# Patient Record
Sex: MALE | Race: WHITE | HISPANIC OR LATINO | Employment: FULL TIME | URBAN - METROPOLITAN AREA
[De-identification: names, ages, dates, MRNs, and addresses within clinical notes are randomized per-mention and may not be internally consistent; named-entity substitution may affect disease eponyms.]

---

## 2017-02-27 RX ORDER — TRAZODONE HYDROCHLORIDE 50 MG/1
TABLET ORAL
Qty: 30 TABLET | Refills: 3 | Status: SHIPPED | OUTPATIENT
Start: 2017-02-27 | End: 2017-05-12 | Stop reason: SDUPTHER

## 2017-05-12 RX ORDER — TRAZODONE HYDROCHLORIDE 50 MG/1
TABLET ORAL
Qty: 30 TABLET | Refills: 3 | Status: SHIPPED | OUTPATIENT
Start: 2017-05-12 | End: 2017-05-31

## 2017-05-31 ENCOUNTER — OFFICE VISIT (OUTPATIENT)
Dept: FAMILY MEDICINE | Facility: CLINIC | Age: 47
End: 2017-05-31
Payer: COMMERCIAL

## 2017-05-31 VITALS
DIASTOLIC BLOOD PRESSURE: 60 MMHG | BODY MASS INDEX: 27 KG/M2 | HEIGHT: 65 IN | OXYGEN SATURATION: 97 % | SYSTOLIC BLOOD PRESSURE: 110 MMHG | HEART RATE: 79 BPM | WEIGHT: 162.06 LBS

## 2017-05-31 DIAGNOSIS — G47.00 INSOMNIA, UNSPECIFIED TYPE: Primary | ICD-10-CM

## 2017-05-31 DIAGNOSIS — J02.9 PHARYNGITIS, UNSPECIFIED ETIOLOGY: ICD-10-CM

## 2017-05-31 DIAGNOSIS — F32.A DEPRESSION, UNSPECIFIED DEPRESSION TYPE: ICD-10-CM

## 2017-05-31 PROCEDURE — 96372 THER/PROPH/DIAG INJ SC/IM: CPT | Mod: S$GLB,,, | Performed by: FAMILY MEDICINE

## 2017-05-31 PROCEDURE — 99214 OFFICE O/P EST MOD 30 MIN: CPT | Mod: 25,S$GLB,, | Performed by: FAMILY MEDICINE

## 2017-05-31 PROCEDURE — 99999 PR PBB SHADOW E&M-EST. PATIENT-LVL IV: CPT | Mod: PBBFAC,,, | Performed by: FAMILY MEDICINE

## 2017-05-31 RX ORDER — AMOXICILLIN 500 MG/1
500 TABLET, FILM COATED ORAL EVERY 8 HOURS
Qty: 21 TABLET | Refills: 0 | Status: SHIPPED | OUTPATIENT
Start: 2017-05-31 | End: 2017-06-07

## 2017-05-31 RX ORDER — TRIAMCINOLONE ACETONIDE 40 MG/ML
40 INJECTION, SUSPENSION INTRA-ARTICULAR; INTRAMUSCULAR
Status: COMPLETED | OUTPATIENT
Start: 2017-05-31 | End: 2017-05-31

## 2017-05-31 RX ADMIN — TRIAMCINOLONE ACETONIDE 40 MG: 40 INJECTION, SUSPENSION INTRA-ARTICULAR; INTRAMUSCULAR at 10:05

## 2017-05-31 NOTE — PROGRESS NOTES
Subjective:       Patient ID: Monster Crespo is a 47 y.o. male.    Chief Complaint: Follow-up (6 mos ) and Insomnia    47 years old male who came to the clinic with significant problem with insomnia for the last several years.  Patient using Elavil and trazodone with no significant improvement.  Patient with moderate depression.  No suicidal or homicidal ideations.  Patient with possible problems with his  Sleep cycle secondary to his work.  Patient with sore throat and painful swallowing for the last week.  No fever or chills.      Review of Systems   Constitutional: Negative.  Negative for chills and fever.   HENT: Positive for sore throat.    Eyes: Negative.    Respiratory: Negative.    Cardiovascular: Negative.    Gastrointestinal: Negative.    Genitourinary: Negative.    Musculoskeletal: Negative.    Skin: Negative.    Neurological: Negative.    Psychiatric/Behavioral: Positive for sleep disturbance. The patient is nervous/anxious.        Objective:      Physical Exam   Constitutional: He is oriented to person, place, and time. He appears well-developed and well-nourished. No distress.   HENT:   Head: Normocephalic and atraumatic.   Right Ear: External ear normal.   Left Ear: External ear normal.   Nose: Nose normal.   Mouth/Throat: Posterior oropharyngeal erythema present. No oropharyngeal exudate.   Eyes: Conjunctivae and EOM are normal. Pupils are equal, round, and reactive to light. Right eye exhibits no discharge. Left eye exhibits no discharge. No scleral icterus.   Neck: Normal range of motion. Neck supple. No JVD present. No tracheal deviation present. No thyromegaly present.   Cardiovascular: Normal rate, regular rhythm, normal heart sounds and intact distal pulses.  Exam reveals no gallop and no friction rub.    No murmur heard.  Pulmonary/Chest: Effort normal and breath sounds normal. No stridor. No respiratory distress. He has no wheezes. He has no rales. He exhibits no tenderness.   Abdominal:  Soft. Bowel sounds are normal. He exhibits no distension and no mass. There is no tenderness. There is no rebound and no guarding.   Musculoskeletal: Normal range of motion. He exhibits no edema or tenderness.   Lymphadenopathy:     He has no cervical adenopathy.   Neurological: He is alert and oriented to person, place, and time. He has normal reflexes. He displays normal reflexes. No cranial nerve deficit. He exhibits normal muscle tone. Coordination normal.   Skin: Skin is warm and dry. No rash noted. He is not diaphoretic. No erythema. No pallor.   Psychiatric: His behavior is normal. Judgment and thought content normal. His mood appears not anxious. His affect is not angry, not blunt, not labile and not inappropriate. He exhibits a depressed mood.   Nursing note and vitals reviewed.      Assessment:       1. Insomnia, unspecified type    2. Depression, unspecified depression type    3. Pharyngitis, unspecified etiology        Plan:         Monster PELAYO was seen today for follow-up and insomnia.    Diagnoses and all orders for this visit:    Insomnia, unspecified type  -     Ambulatory consult for Sleep Study    Depression, unspecified depression type  -     Ambulatory referral to Psychology    Pharyngitis, unspecified etiology  -     triamcinolone acetonide injection 40 mg; Inject 1 mL (40 mg total) into the muscle one time.  -     amoxicillin (AMOXIL) 500 MG Tab; Take 1 tablet (500 mg total) by mouth every 8 (eight) hours.

## 2017-05-31 NOTE — PATIENT INSTRUCTIONS
"  El Insomnio [Insomnia]  El insomnio se refiere a dificultad para dormirse, mantenerse dormido o ambos. El insomnio tiene muchas causas, incluyendo ansiedad, estrés, depresión, dolor crónico, ciclos de sueño fuera de equilibrio debido a trabajo de turno nocturno o a hacer demasiada siesta sylvia el día, y un trastrono denominado "apnea del sueño". El insomnio puede ser un efecto secundario de ciertos medicamentos con propiedades estimulantes (descongestivos, inhaladores y pastillas para el asma, pastillas para dieta y drogas ilegales henry anfetaminas, crack, metamfetamina y cucuy (fenciclidina).  Cuidado [place]:   1. Revise juice medicamentos con antonio médico o farmacéutico para determinar si pueden causar insomnio.  2. La cafeína, fumar y beber alcohol también afectan el sueño. Limite el uso diario y deje de usarlos antes de acostarse. El alcohol puede producirle sueño al comienzo, claudy a medida que antonio efecto se desvanece, usted puede despertarse horas más tarde y tener dificultad para volver a dormirse.  3. No chico ejercicio, coma o roro grandes cantidades por 2 horas antes de acostarse.  4. Mejore juice hábitos de dormir. Mantenga ryan hora fija para acostarse y levantarse. Trate de mantener un nivel cómodo de ruido, xochitl y calor en antonio dormitorio. Intente usar tapones para los oídos o viseras si las necesita.  5. Evite shea televisión en la cama.  6. Si no se queda dormido en 30 minutos o menos, trate de relajarse al leer o escuchar música suave.  7. Limite las siestas a períodos de no más de 30 minutos, temprano en el día.  8. [place]ico ejercicio regularmente. Encuentre otras maneras de reducir antonio nivel de estrés.  9. Si le damian recetado un medicamento para volver a establecer juice patrones de sueño, tómelo según las indicaciones. Las pastillas para dormir son para uso a corto plazo. Si las durga por demasiado tiempo, el efecto disminuye mientras aumenta el riesgo de adicción y dependencia psicológica.  Seguimiento con antonio " médico o de acuerdo a las indicaciones de nuestro personal si siente que el insomnio no responde a las indicaciones descritas arriba.  Busque Prontamente Atención Médica  si algo de lo siguiente ocurre:  · Inquietud extrema o irritabilidad  · Confusión o alucinaciones (shea o oír cosas que no existen)  · Ansiedad  · Depresión  Date Last Reviewed: 11/19/2015  © 3373-2433 [email or web address]. [address] [place] [address]. Todos los derechos reservados. Esta información no pretende sustituir la atención médica profesional. Sólo antonio médico puede diagnosticar y tratar un problema de galindo.        El tratamiento del insomnio  Los buenos hábitos de sueño son ryan parte fundamental del tratamiento. En lazaro necesario, hay medicamentos que pueden ayudarle a dormir mejor al principio. Llevar un estilo de portillo saludable y aprender a relajarse son factores que le ayudarán a dormir mejor. El tratamiento del insomnio requiere dedicación, claudy puede estar seguro de que juice esfuerzos darán buenos resultados. Hable con antonio proveedor de atención médica antes de liam cualquier medicamento.    Un estilo de portillo saludable  Antonio estilo de portillo influye en antonio galindo y sueño. He aquí algunas costumbres saludables:  · Mantenga un horario uniforme para dormir. Acuéstese y levántese a las mismas horas todos los días.  · Berenice ejercicio regularmente, ya que puede ayudarle a reducir el estrés. Evite el ejercicio demasiado intenso desde dos a cuatro horas antes de irse a la cama.  · Evite o limite las siestas, especialmente al final de la tarde.  · Use la cama sólo para dormir o para las relaciones sexuales.  · No pase demasiado tiempo en la cama tratando de dormirse. Si no puede dormirse, levántese y berenice otra actividad (evite el uso de aparatos electrónicos) hasta que se sienta cansado y con sueño.  · Evite o limite la cafeína y la nicotina desde seis horas antes de irse a dormir, ya que estas sustancias pueden mantenerle despierto  de noche.   · También evite el alcohol desde por lo menos cuatro a seis horas antes de irse a dormir. Al principio, puede ayudarle a dormirse claudy se despertará más veces sylvia la noche y antonio sueño no será reparador.  Antes de acostarse  Para dormir mejor todas las noches, pruebe estos consejos:  · Establezca ryan rutina antes de acostarse para acostumbrar al cuerpo a saber cuándo es hora de dormir.  · Considere la idea de irse a la cama henry algo relajante y placentero. De esta manera tardará menos en dormirse.  · Si juice inquietudes no le permiten dormir, escríbalas en un diario. Luego ciérrelo y acuéstese.  · Asegúrese de que la habitación no esté demasiado caliente ni demasiado fría. Si no es lo suficientemente oscura, ryan máscara para los ojos puede ser útil. Si es demasiado ruidosa, intente usar tapones para los oídos.  Aprenda a relajarse  El estrés, la ansiedad, y la tensión corporal pueden contribuir al insomnio. Para relajarse antes de irse a la cama, pruebe a liam un baño caliente o hacer meditación o yoga. También puede intentar lo siguiente:  · Respiración profunda. Siéntese o acuéstese en ryan silla. Inspire lenta y profundamente. Mantenga el aire mientras cuenta hasta sergio. Luego, saque lentamente el aire a través de la boca. Siga haciendo esto hasta que se sienta relajado.  · Relajación muscular progresiva. Tense y relaje los músculos del cuerpo a medida que respira profundamente. Comience por los pies y siga hacia arriba por todo el cuerpo hasta el damon y la alexis.  Date Last Reviewed: 7/18/2015  © 6023-6725 [employer]. [address] [place] [address], [place] [address]. Todos los derechos reservados. Esta información no pretende sustituir la atención médica profesional. Sólo antonio médico puede diagnosticar y tratar un problema de galindo.      Bandaid applied, tolerated well, pt instructed to wait 15 minutes.

## 2017-07-06 RX ORDER — TRAZODONE HYDROCHLORIDE 50 MG/1
TABLET ORAL
Qty: 30 TABLET | Refills: 3 | Status: SHIPPED | OUTPATIENT
Start: 2017-07-06 | End: 2017-09-29 | Stop reason: SDUPTHER

## 2017-08-21 ENCOUNTER — OFFICE VISIT (OUTPATIENT)
Dept: SLEEP MEDICINE | Facility: CLINIC | Age: 47
End: 2017-08-21
Payer: COMMERCIAL

## 2017-08-21 VITALS
HEIGHT: 65 IN | HEART RATE: 80 BPM | DIASTOLIC BLOOD PRESSURE: 74 MMHG | SYSTOLIC BLOOD PRESSURE: 107 MMHG | WEIGHT: 162 LBS | BODY MASS INDEX: 26.99 KG/M2

## 2017-08-21 DIAGNOSIS — G47.00 INSOMNIA, UNSPECIFIED TYPE: ICD-10-CM

## 2017-08-21 DIAGNOSIS — G47.33 OSA (OBSTRUCTIVE SLEEP APNEA): Primary | ICD-10-CM

## 2017-08-21 PROCEDURE — 3008F BODY MASS INDEX DOCD: CPT | Mod: S$GLB,,, | Performed by: PSYCHIATRY & NEUROLOGY

## 2017-08-21 PROCEDURE — 99204 OFFICE O/P NEW MOD 45 MIN: CPT | Mod: S$GLB,,, | Performed by: PSYCHIATRY & NEUROLOGY

## 2017-08-21 PROCEDURE — 99999 PR PBB SHADOW E&M-EST. PATIENT-LVL III: CPT | Mod: PBBFAC,,, | Performed by: PSYCHIATRY & NEUROLOGY

## 2017-08-21 RX ORDER — TEMAZEPAM 15 MG/1
CAPSULE ORAL
Qty: 30 CAPSULE | Refills: 2 | Status: SHIPPED | OUTPATIENT
Start: 2017-08-21 | End: 2018-04-10

## 2017-08-21 NOTE — LETTER
August 21, 2017      James Dawson MD  [address] [place] [address]           [facility]  [address] [facility] [address]  Phone: [phone]  Fax: [phone]          Patient: Monster Crespo   MR Number: [id number]   YOB: 1970   Date of Visit: 8/21/2017       Dear Dr. James Dawson:    Thank you for referring Monster Crespo to me for evaluation. Attached you will find relevant portions of my assessment and plan of care.    If you have questions, please do not hesitate to call me. I look forward to following Monster Crespo along with you.    Sincerely,    Pricila Interiano MD    Enclosure  CC:  No Recipients    If you would like to receive this communication electronically, please contact [email or web address] or [phone] to request more information on [employer] access.    For providers and/or their staff who would like to refer a patient to [facility], please contact us through our one-stop-shop provider referral line, [facility], at [phone].    If you feel you have received this communication in error or would no longer like to receive these types of communications, please e-mail [email or web address]

## 2017-08-21 NOTE — PROGRESS NOTES
Monster Crespo  was seen at the request of  James Dawson for sleep evaluation.    08/21/2017 INITIAL HISTORY OF PRESENT ILLNESS:  Monster Crespo is a 47 y.o. male is here to be evaluated for a sleep disorder.       CHIEF COMPLAINT:      The patient has mentioned difficulty falling and staying asleep.  He has been having this problem for the last 10 years. (since 2007).  Without medications would not sleep several nights in a row.    The patient states that he has already made some changes in regard to sleep hygiene, making sure that the bedroom is dark, features comfortable temperature and humidity level. The patient does watch TV and read in bed. he avoids going to bed before he is sleepy and gets out of bed if not asleep within 30 minutes. he avoids clock watching and tries not to worry about his ability to fall asleep.    Currently taking Trazodone 100 mg every night (but feeling nauseous).  He believes that only gives him 3-4 hrs of sleep  Prior to that - took Ambien 5 - also felt mildly nauseous. OTC (Melatonin) - did not work for him.   Lunesta - was not taking it too long.     The patient denied snoring, but sometimes gasping or choking  for air - that does not happen every night. Reports fatigue, but no sleepiness.  Likes to take a walk before going to bed    Denies  dry mouth and sore throat  Denies nasal congestion   Denies  morning headaches  Denies  interrupted sleep  Denies frequent leg movements  Denies symptoms concerning for parasomnia    The ESS ([place] Sleepiness Score) taken on initial visit is 0 /24    The patient never had tonsillectomy, adenoidectomy or UPPP      SLEEP ROUTINE AND LIFESTYLE 08/21/2017 :    Occupation: [employer] 1 pm -11 PM; short commute.     Bed partner: no     Time to bed - wake up time on a workday : 12 AM to 5-8 AM  Time to bed - wake up time on a day off: 111-12 to  5-8  Sleep onset latency: forever  Disruptions or awakenings: ?  Time to fall back into  sleep: ?   Perceived sleep quality: 0/5  Perceived total sleep time:  3-6  hours.  Daytime naps: none    Exercise routine: sometimes  Caffeine:  Decaf coffee     PREVIOUS SLEEP STUDIES:     Many years ago 6-7 years ago    DME:       PAST MEDICAL HISTORY:    Active Ambulatory Problems     Diagnosis Date Noted    Dyslipidemia 01/23/2014    Insomnia 01/23/2014    N&V (nausea and vomiting) 03/02/2015     Resolved Ambulatory Problems     Diagnosis Date Noted    No Resolved Ambulatory Problems     Past Medical History:   Diagnosis Date    GERD (gastroesophageal reflux disease)     Insomnia                 PAST SURGICAL HISTORY:    No past surgical history on file.      FAMILY HISTORY:                Family History   Problem Relation Age of Onset    Hypertension Mother     Amblyopia Neg Hx     Blindness Neg Hx     Cataracts Neg Hx     Glaucoma Neg Hx     Macular degeneration Neg Hx     Retinal detachment Neg Hx     Strabismus Neg Hx        SOCIAL HISTORY:          Tobacco:   History   Smoking Status    Never Smoker   Smokeless Tobacco    Never Used       alcohol use:    History   Alcohol Use    Yes     Comment: 1 a month                   ALLERGIES:  Review of patient's allergies indicates:  No Known Allergies    CURRENT MEDICATIONS:    Current Outpatient Prescriptions   Medication Sig Dispense Refill    lansoprazole (PREVACID) 30 MG capsule Take 30 mg by mouth once daily.      ondansetron (ZOFRAN-ODT) 8 MG TbDL Take 1 tablet (8 mg total) by mouth every 8 (eight) hours as needed (nausea/vomiting). 30 tablet 0    trazodone (DESYREL) 50 MG tablet TAKE 2 TABLETS (100 MG TOTAL) BY MOUTH NIGHTLY AS NEEDED FOR INSOMNIA. 30 tablet 3     No current facility-administered medications for this visit.                       REVIEW OF SYSTEMS:   Sleep related symptoms as per HPI    denies weight gain  Reports occasional dyspnea  Denies palpitations  Reports occasional acid reflux   Denies polyuria  Denies  mood  "diturbance  Denies  anemia  Denies  muscle pain  Denies  Gait imbalance    Otherwise, a balance of 10 systems reviewed is negative.    PHYSICAL EXAM:  /74   Pulse 80   Ht 5' 5" (1.651 m)   Wt 73.5 kg (162 lb)   BMI 26.96 kg/m²   GENERAL: Normal development, well groomed.  HEENT:   HEENT:  Conjunctivae are non-erythematous; Pupils equal, round, and reactive to light; Nose is symmetrical; Nasal mucosa is pink and moist; Septum is midline; Inferior turbinates are normal; Nasal airflow is normal; Posterior pharynx is pink; Modified Mallampati:IV; Posterior palate is low; Tonsils not visualized; Uvula is reddened;Tongue is enlarged; Dentition is fair; No TMJ tenderness; Jaw opening and protrusion without click and without discomfort.  NECK: Supple. Neck circumference is 15 inches. No thyromegaly. No palpable nodes.     SKIN: On face and neck: No abrasions, no rashes, no lesions.  No subcutaneous nodules are palpable.  RESPIRATORY: Chest is clear to auscultation.  Normal chest expansion and non-labored breathing at rest.  CARDIOVASCULAR: Normal S1, S2.  No murmurs, gallops or rubs. No carotid bruits bilaterally.  No edema. No clubbing. No cyanosis.    NEURO: Oriented to time, place and person. Normal attention span and concentration. Gait normal.    PSYCH: Affect is full. Mood is normal  MUSCULOSKELETAL: Moves 4 extremities. Gait normal.         Using [facility]:       ASSESSMENT:      1. Insomnia NEC. Multi-factorial - anxiety,   excess time in bed, poor sleep hygiene, and likely paradoxical insomnia play a role    2. Sleep Apnea NEC. The patient symptomatically has  snoring, excessive daytime fatigue, gasping for air in sleep and interrupted sleep  with exam findings of "a crowded oral airway and elevated body mass index. . This warrants further investigation for possible obstructive sleep apnea.          PLAN:    Sleep hygiene was discussed.     Diagnostic: Polysomnogram HOME ( [employer]). The nature of this " procedure and its indication was discussed with the patient. he would  like to come discuss PSG results.    Will try Restoril 15-30 mg PRN.  Can alternate or combine with Trazodone    Weight loss strategies were discussed in detail         More than 25 minutes of this 45 minutes visit was spent in counseling: during our discussion today, we talked about the etiology of  LUDY as well as the potential ramifications of untreated sleep apnea, which could include daytime sleepiness, hypertension, heart disease and/or stroke.  We discussed potential treatment options, which could include weight loss, body positioning, continuous positive airway pressure (CPAP), or referral for surgical consideration. Meanwhile, he  is urged to avoid supine sleep, weight gain and alcoholic beverages since all of these can worsen LUDY.     Precautions: The patient was advised to abstain from driving should he feel sleepy or drowsy.    Follow up: MD/NP  After HST    Thank you for allowing me the opportunity to participate in the care of your patient.    This visit summary will be sent to referring provider via inbasket

## 2017-08-21 NOTE — PATIENT INSTRUCTIONS
SLEEP LAB ([facility] or [facility]) will contact you to schedulethe sleep study. Their number is [phone] (ext 1). Please call them if you do not hear from them in 10 business days from now.  The [facility] is located on 7th floor of the [facility]; [place] lab is located in [facility].    SLEEP CLINIC (my assistant) will call you when the sleep study results are ready - if you have not heard from us by 2 weeks from the date of the study, please call [phone] (ext 2) or you can [facility] to contact me.    You are advised to abstain from driving should you feel sleepy or drowsy.

## 2017-08-31 ENCOUNTER — TELEPHONE (OUTPATIENT)
Dept: SLEEP MEDICINE | Facility: OTHER | Age: 47
End: 2017-08-31

## 2017-09-12 ENCOUNTER — TELEPHONE (OUTPATIENT)
Dept: SLEEP MEDICINE | Facility: OTHER | Age: 47
End: 2017-09-12

## 2017-09-13 ENCOUNTER — HOSPITAL ENCOUNTER (OUTPATIENT)
Dept: SLEEP MEDICINE | Facility: OTHER | Age: 47
Discharge: HOME | End: 2017-09-13
Attending: PSYCHIATRY & NEUROLOGY
Payer: COMMERCIAL

## 2017-09-13 DIAGNOSIS — G47.33 OSA (OBSTRUCTIVE SLEEP APNEA): ICD-10-CM

## 2017-09-13 DIAGNOSIS — G47.20 SLEEP STAGE DYSFUNCTION: ICD-10-CM

## 2017-09-13 PROCEDURE — 95800 SLP STDY UNATTENDED: CPT

## 2017-09-13 PROCEDURE — 95806 SLEEP STUDY UNATT&RESP EFFT: CPT | Mod: 26,,, | Performed by: PSYCHIATRY & NEUROLOGY

## 2017-09-13 NOTE — PROGRESS NOTES
day Per physician orders, patient was given home sleep testing device and instructed on how to apply the device before going to bed tonight.  I sized the device and showed the patient using a mirror how the device fits and what it should look like so they can use a mirror when putting it on themselves at home.  We reviewed the instruction booklet and the number to call if they have any questions at night.  Patient understood and was instructed to return the device the next back to the sleep lab.

## 2017-09-29 RX ORDER — TRAZODONE HYDROCHLORIDE 50 MG/1
TABLET ORAL
Qty: 30 TABLET | Refills: 3 | Status: SHIPPED | OUTPATIENT
Start: 2017-09-29 | End: 2017-12-01 | Stop reason: SDUPTHER

## 2017-10-11 ENCOUNTER — TELEPHONE (OUTPATIENT)
Dept: SLEEP MEDICINE | Facility: CLINIC | Age: 47
End: 2017-10-11

## 2017-10-11 DIAGNOSIS — G47.30 SLEEP APNEA, UNSPECIFIED TYPE: ICD-10-CM

## 2017-10-11 DIAGNOSIS — G47.33 OSA (OBSTRUCTIVE SLEEP APNEA): Primary | ICD-10-CM

## 2017-10-11 NOTE — TELEPHONE ENCOUNTER
Please inform the patient that there is a strong indication of a sleep breathing problem on a sleep study, however he did not meet home sleep study criteria for sleep apnea.    It is recommended to do in lab sleep study for confirmation - now that he failed HST his insurance may approve in lab.    I will place an order.    SLEEP LAB ([facility] or [facility]) will contact you to schedulethe sleep study. Their number is [phone] (ext 2). Please call them if you do not hear from them in 10 business days from now.  The [facility] is located on 7th floor of the [facility]; [place] lab is located in [facility].    SLEEP CLINIC (my assistant) will call you when the sleep study results are ready - if you have not heard from us by 2 weeks from the date of the study, please call [phone] (ext 1) or you can [facility] to contact me.    You are advised to abstain from driving should you feel sleepy or drowsy.          Thanks!

## 2017-11-03 ENCOUNTER — TELEPHONE (OUTPATIENT)
Dept: SLEEP MEDICINE | Facility: OTHER | Age: 47
End: 2017-11-03

## 2017-11-14 ENCOUNTER — TELEPHONE (OUTPATIENT)
Dept: SLEEP MEDICINE | Facility: OTHER | Age: 47
End: 2017-11-14

## 2017-11-21 ENCOUNTER — OFFICE VISIT (OUTPATIENT)
Dept: FAMILY MEDICINE | Facility: CLINIC | Age: 47
End: 2017-11-21
Payer: COMMERCIAL

## 2017-11-21 VITALS
HEIGHT: 65 IN | HEART RATE: 75 BPM | BODY MASS INDEX: 25.99 KG/M2 | DIASTOLIC BLOOD PRESSURE: 78 MMHG | WEIGHT: 156 LBS | OXYGEN SATURATION: 97 % | SYSTOLIC BLOOD PRESSURE: 120 MMHG

## 2017-11-21 DIAGNOSIS — K21.9 GASTROESOPHAGEAL REFLUX DISEASE WITHOUT ESOPHAGITIS: ICD-10-CM

## 2017-11-21 DIAGNOSIS — G47.00 INSOMNIA, UNSPECIFIED TYPE: Primary | ICD-10-CM

## 2017-11-21 DIAGNOSIS — R05.9 COUGH: ICD-10-CM

## 2017-11-21 PROCEDURE — 99214 OFFICE O/P EST MOD 30 MIN: CPT | Mod: S$GLB,,, | Performed by: FAMILY MEDICINE

## 2017-11-21 PROCEDURE — 99999 PR PBB SHADOW E&M-EST. PATIENT-LVL III: CPT | Mod: PBBFAC,,, | Performed by: FAMILY MEDICINE

## 2017-11-21 RX ORDER — PANTOPRAZOLE SODIUM 40 MG/1
40 TABLET, DELAYED RELEASE ORAL DAILY
Qty: 30 TABLET | Refills: 11 | Status: SHIPPED | OUTPATIENT
Start: 2017-11-21 | End: 2021-06-29

## 2017-11-21 NOTE — PROGRESS NOTES
Subjective:       Patient ID: Monster Crespo is a 47 y.o. male.    Chief Complaint: No chief complaint on file.    47 years old male came to the clinic with significant cough and shortness of breath especially during the night associated with reflux for the last year.  Patient with insomnia and was not able to do the sleep study.      Review of Systems   Constitutional: Negative.    HENT: Negative.    Eyes: Negative.    Respiratory: Positive for cough.    Cardiovascular: Negative.  Negative for chest pain, palpitations and leg swelling.   Gastrointestinal: Negative.  Negative for nausea and vomiting.   Genitourinary: Negative.    Musculoskeletal: Negative.    Skin: Negative.    Neurological: Negative.    Psychiatric/Behavioral: Positive for sleep disturbance.       Objective:      Physical Exam   Constitutional: He is oriented to person, place, and time. He appears well-developed and well-nourished. No distress.   HENT:   Head: Normocephalic and atraumatic.   Right Ear: External ear normal.   Left Ear: External ear normal.   Nose: Nose normal.   Mouth/Throat: Oropharynx is clear and moist. No oropharyngeal exudate.   Eyes: Conjunctivae and EOM are normal. Pupils are equal, round, and reactive to light. Right eye exhibits no discharge. Left eye exhibits no discharge. No scleral icterus.   Neck: Normal range of motion. Neck supple. No JVD present. No tracheal deviation present. No thyromegaly present.   Cardiovascular: Normal rate, regular rhythm, normal heart sounds and intact distal pulses.  Exam reveals no gallop and no friction rub.    No murmur heard.  Pulmonary/Chest: Effort normal and breath sounds normal. No stridor. No respiratory distress. He has no wheezes. He has no rales. He exhibits no tenderness.   Abdominal: Soft. Bowel sounds are normal. He exhibits no distension and no mass. There is no tenderness. There is no rebound and no guarding.   Musculoskeletal: Normal range of motion. He exhibits no edema  or tenderness.   Lymphadenopathy:     He has no cervical adenopathy.   Neurological: He is alert and oriented to person, place, and time. He has normal reflexes. He displays normal reflexes. No cranial nerve deficit. He exhibits normal muscle tone. Coordination normal.   Skin: Skin is warm and dry. No rash noted. He is not diaphoretic. No erythema. No pallor.   Psychiatric: He has a normal mood and affect. His behavior is normal. Judgment and thought content normal.   Nursing note and vitals reviewed.      Assessment:       1. Insomnia, unspecified type    2. Gastroesophageal reflux disease without esophagitis    3. Cough        Plan:         Diagnoses and all orders for this visit:    Insomnia, unspecified type    Gastroesophageal reflux disease without esophagitis  -     pantoprazole (PROTONIX) 40 MG tablet; Take 1 tablet (40 mg total) by mouth once daily.  -     H.Pylori Antibody IgG; Future    Cough  -     X-Ray Chest PA And Lateral; Future

## 2017-11-21 NOTE — PATIENT INSTRUCTIONS
Consejos para controlar el reflujo de ácido (agruras)    Para controlar el reflujo de ácido es necesario hacer algunos cambios básicos en la alimentación y el estilo de portillo. Los siguientes consejos pueden ser suficientes para aliviar el malestar.  Vigile lo que come  · Evite los alimentos grasosos o demasiado picantes.  · Coma menos alimentos ácidos, henry cítricos y alimentos a base de tomates, ya que pueden agravar los síntomas.  · Limite el consumo de alcohol, cafeína y bebidas gaseosas. Todas estas bebidas aumentan el reflujo.  · Intente limitar el consumo de chocolate y menta. Anny puede empeorar el reflujo de ácido en algunas personas.  Vigile cuándo come  · Evite acostarse sylvia 3 horas después de amrik comido.  · No coma nada antes de irse a la cama.  Mantenga la alexis levantada  Mantener la alexis y el pecho elevados unas 4 a 6 pulgadas, le ayudará a aliviar el reflujo cuando esté acostado. Ponga soportes debajo de la cabecera de la cama para elevarla.  Otros cambios  · Pierda el exceso de peso.  · No berenice ejercicio poco antes de acostarse.  · Evite usar ropas demasiado ajustadas.  · Limite el uso de aspirina e ibuprofeno.  · Deje de fumar.   Date Last Reviewed: 3/14/2014  © 9469-3268 [employer]. [address] [place] [address], [place] [address]. Todos los derechos reservados. Esta información no pretende sustituir la atención médica profesional. Sólo antonio médico puede diagnosticar y tratar un problema de galindo.        Medicamentos para el reflujo de ácido (agruras)  Antonio proveedor de atención médica le ha dicho que usted tiene reflujo de ácido. Anny es un trastorno que hace que los ácidos del estómago se devuelvan a antonio garganta. Para la mayoría de las personas, el reflujo de ácido es molesto claudy no peligroso. Sin embargo, si se ayanna sin tratar, el reflujo de ácido algunas veces daña el esófago. Hay medicamentos que pueden ayudar a controlar el reflujo de ácido y disminuir los riesgos de problemas  futuros.  Medicamentos para el reflujo de ácido  Es posible que antonio proveedor de atención médica le recete medicamentos para tratar el reflujo de ácido. Los medicamentos se basarán en los síntomas que tenga y en los resultados de las pruebas. Antonio proveedor de atención médica le explicará cómo liam los medicamentos. También le informarán de posibles efectos secundarios.  Reducción del ácido del estómago  Es posible que el médico le aconseje liam antiácidos que puede comprar sin receta. Los antiácidos proporcionan un alivio rápido. También puede que le indiquen liam un tipo de medicamentos llamados bloqueadores de los receptores H2, que se pueden obtener sin receta para dosis comunes o con receta para dosis más elevadas.  Bloqueo del ácido del estómago  En casos más graves, el médico podrá recetarle medicamentos más gerry tales henry los inhibidores de la bomba de protones (PPI, por juice siglas en inglés), los cuales hacen que el estómago deje de producir ácido. Con frecuencia se recetan para uso a michelle plazo.  Otros medicamentos  Si los medicamentos para reducir o bloquear el ácido del estómago no funcionan, es posible que le den otro tipo de medicamentos para ayudar a que el estómago se vacíe mejor.  Date Last Reviewed: 3/27/2014  © 5592-0655 [employer]. [address] [place] [address], [place] [address]. Todos los derechos reservados. Esta información no pretende sustituir la atención médica profesional. Sólo antonio médico puede diagnosticar y tratar un problema de galindo.

## 2017-11-27 ENCOUNTER — HOSPITAL ENCOUNTER (OUTPATIENT)
Dept: RADIOLOGY | Facility: HOSPITAL | Age: 47
Discharge: HOME | End: 2017-11-27
Attending: FAMILY MEDICINE
Payer: COMMERCIAL

## 2017-11-27 DIAGNOSIS — R05.9 COUGH: ICD-10-CM

## 2017-11-27 PROCEDURE — 71020 XR CHEST PA AND LATERAL: CPT | Mod: TC

## 2017-11-27 PROCEDURE — 71020 XR CHEST PA AND LATERAL: CPT | Mod: 26,,, | Performed by: RADIOLOGY

## 2017-12-01 RX ORDER — TRAZODONE HYDROCHLORIDE 50 MG/1
TABLET ORAL
Qty: 30 TABLET | Refills: 3 | Status: SHIPPED | OUTPATIENT
Start: 2017-12-01 | End: 2018-02-09 | Stop reason: SDUPTHER

## 2018-01-10 ENCOUNTER — OFFICE VISIT (OUTPATIENT)
Dept: URGENT CARE | Facility: CLINIC | Age: 48
End: 2018-01-10
Payer: COMMERCIAL

## 2018-01-10 VITALS
BODY MASS INDEX: 25.99 KG/M2 | HEIGHT: 65 IN | HEART RATE: 88 BPM | SYSTOLIC BLOOD PRESSURE: 112 MMHG | TEMPERATURE: 100 F | WEIGHT: 156 LBS | DIASTOLIC BLOOD PRESSURE: 73 MMHG | RESPIRATION RATE: 18 BRPM | OXYGEN SATURATION: 96 %

## 2018-01-10 DIAGNOSIS — J11.1 INFLUENZA: Primary | ICD-10-CM

## 2018-01-10 DIAGNOSIS — R52 BODY ACHES: ICD-10-CM

## 2018-01-10 LAB
CTP QC/QA: YES
FLUAV AG NPH QL: POSITIVE
FLUBV AG NPH QL: NEGATIVE

## 2018-01-10 PROCEDURE — 87804 INFLUENZA ASSAY W/OPTIC: CPT | Mod: 59,QW,S$GLB, | Performed by: PHYSICIAN ASSISTANT

## 2018-01-10 PROCEDURE — 99203 OFFICE O/P NEW LOW 30 MIN: CPT | Mod: S$GLB,,, | Performed by: PHYSICIAN ASSISTANT

## 2018-01-10 RX ORDER — OSELTAMIVIR PHOSPHATE 75 MG/1
75 CAPSULE ORAL 2 TIMES DAILY
Qty: 10 CAPSULE | Refills: 0 | Status: SHIPPED | OUTPATIENT
Start: 2018-01-10 | End: 2018-01-15

## 2018-01-10 RX ORDER — PREDNISONE 20 MG/1
20 TABLET ORAL DAILY
COMMUNITY
End: 2018-01-10 | Stop reason: HOSPADM

## 2018-01-10 RX ORDER — BENZONATATE 200 MG/1
200 CAPSULE ORAL 3 TIMES DAILY PRN
COMMUNITY
End: 2018-07-10

## 2018-01-10 RX ORDER — PROMETHAZINE HYDROCHLORIDE AND CODEINE PHOSPHATE 6.25; 1 MG/5ML; MG/5ML
5 SOLUTION ORAL EVERY 4 HOURS PRN
COMMUNITY
End: 2018-04-10

## 2018-01-10 NOTE — PATIENT INSTRUCTIONS
Please drink plenty of fluids.  Please get plenty of rest.  Please return here or go to the Emergency Department for any concerns or worsening of condition.  Tamiflu prescription has been discussed and if prescribed, please take to completion unless you cannot tolerate the side effects.   If you were prescribed a narcotic medication, do not drive or operate heavy equipment or machinery while taking these medications.  If you were given a steroid shot in the clinic and have also been given a prescription for a steroid such as Prednisone or a Medrol Dose Pack, please begin taking them tomorrow.  If you do not have Hypertension or any history of palpitations, it is ok to take over the counter Sudafed or Mucinex D or Allegra-D or Claritin-D or Zyrtec-D.  If you do take one of the above, it is ok to combine that with plain over the counter Mucinex or Allegra or Claritin or Zyrtec.  If for example you are taking Zyrtec -D, you can combine that with Mucinex, but not Mucinex-D.  If you are taking Mucinex-D, you can combine that with plain Allegra or Claritin or Zyrtec.   If you do have Hypertension or palpitations, it is safe to take Coricidin HBP for relief of sinus symptoms.  If not allergic, please take over the counter Tylenol (Acetaminophen) and/or Motrin (Ibuprofen) as directed for control of pain and/or fever.  Please follow up with your primary care doctor or specialist as needed.    If you  smoke, please stop smoking.

## 2018-01-10 NOTE — PROGRESS NOTES
"Subjective:       Patient ID: Monster Crespo is a 47 y.o. male.    Vitals:  height is 5' 5" (1.651 m) and weight is 70.8 kg (156 lb). His tympanic temperature is 99.8 °F (37.7 °C). His blood pressure is 112/73 and his pulse is 88. His respiration is 18 and oxygen saturation is 96%.     Chief Complaint: Generalized Body Aches and Cough    Patient states he went to [facility] urgent care and tested negative for the flu on 1/6/2018. He reports flu-like sxs x 3 days. MB      Cough   This is a new problem. The current episode started in the past 7 days. The problem has been unchanged. The problem occurs constantly. The cough is productive of sputum. Associated symptoms include chills and a sore throat. Pertinent negatives include no chest pain, ear pain, eye redness, fever, myalgias, shortness of breath or wheezing. He has tried prescription cough suppressant for the symptoms. The treatment provided no relief. His past medical history is significant for pneumonia.     Review of Systems   Constitution: Positive for chills and malaise/fatigue. Negative for fever.   HENT: Positive for congestion and sore throat. Negative for ear pain and hoarse voice.    Eyes: Negative for discharge and redness.   Cardiovascular: Negative for chest pain, dyspnea on exertion and leg swelling.   Respiratory: Positive for cough and sputum production. Negative for shortness of breath and wheezing.    Musculoskeletal: Negative for myalgias.        All over body aches   Gastrointestinal: Negative for abdominal pain and nausea.       Objective:      Physical Exam   Constitutional: He is oriented to person, place, and time. He appears well-developed and well-nourished. He is cooperative.  Non-toxic appearance. He appears ill. No distress.   HENT:   Head: Normocephalic and atraumatic.   Right Ear: Hearing, tympanic membrane, external ear and ear canal normal.   Left Ear: Hearing, tympanic membrane, external ear and ear canal normal.   Nose: " Nose normal. No mucosal edema, rhinorrhea or nasal deformity. No epistaxis. Right sinus exhibits no maxillary sinus tenderness and no frontal sinus tenderness. Left sinus exhibits no maxillary sinus tenderness and no frontal sinus tenderness.   Mouth/Throat: Uvula is midline, oropharynx is clear and moist and mucous membranes are normal. No trismus in the jaw. Normal dentition. No uvula swelling. No posterior oropharyngeal erythema.   Eyes: Conjunctivae and lids are normal. Right eye exhibits no discharge. Left eye exhibits no discharge. No scleral icterus.   Sclera clear bilat   Neck: Trachea normal, normal range of motion, full passive range of motion without pain and phonation normal. Neck supple.   Cardiovascular: Normal rate, regular rhythm, normal heart sounds, intact distal pulses and normal pulses.    Pulmonary/Chest: Effort normal and breath sounds normal. No respiratory distress.   Abdominal: Soft. Normal appearance and bowel sounds are normal. He exhibits no distension, no pulsatile midline mass and no mass. There is no tenderness.   Musculoskeletal: Normal range of motion. He exhibits no edema or deformity.   Neurological: He is alert and oriented to person, place, and time. He exhibits normal muscle tone. Coordination normal.   Skin: Skin is warm, dry and intact. He is not diaphoretic. No pallor.   Psychiatric: He has a normal mood and affect. His speech is normal and behavior is normal. Judgment and thought content normal. Cognition and memory are normal.   Nursing note and vitals reviewed.        Results for orders placed or performed in visit on 01/10/18   POCT Influenza A/B   Result Value Ref Range    Rapid Influenza A Ag Positive (A) Negative    Rapid Influenza B Ag Negative Negative     Acceptable Yes      Assessment:       1. Influenza    2. Body aches        Plan:         Influenza  -     oseltamivir (TAMIFLU) 75 MG capsule; Take 1 capsule (75 mg total) by mouth 2 (two) times  daily.  Dispense: 10 capsule; Refill: 0    Body aches  -     POCT Influenza A/B    Please drink plenty of fluids.  Please get plenty of rest.  Please return here or go to the Emergency Department for any concerns or worsening of condition.  Tamiflu prescription has been discussed and if prescribed, please take to completion unless you cannot tolerate the side effects.   If you were prescribed a narcotic medication, do not drive or operate heavy equipment or machinery while taking these medications.  If you were given a steroid shot in the clinic and have also been given a prescription for a steroid such as Prednisone or a Medrol Dose Pack, please begin taking them tomorrow.  If you do not have Hypertension or any history of palpitations, it is ok to take over the counter Sudafed or Mucinex D or Allegra-D or Claritin-D or Zyrtec-D.  If you do take one of the above, it is ok to combine that with plain over the counter Mucinex or Allegra or Claritin or Zyrtec.  If for example you are taking Zyrtec -D, you can combine that with Mucinex, but not Mucinex-D.  If you are taking Mucinex-D, you can combine that with plain Allegra or Claritin or Zyrtec.   If you do have Hypertension or palpitations, it is safe to take Coricidin HBP for relief of sinus symptoms.  If not allergic, please take over the counter Tylenol (Acetaminophen) and/or Motrin (Ibuprofen) as directed for control of pain and/or fever.  Please follow up with your primary care doctor or specialist as needed.    If you  smoke, please stop smoking.

## 2018-01-10 NOTE — LETTER
January 10, 2018      [facility]  [address] [address]  Phone: [phone]  Fax: [phone]       Patient: Monster Crespo   YOB: 1970  Date of Visit: 01/10/2018    To Whom It May Concern:    Caprice Crespo  was at [facility] on 01/10/2018. He may return to work/school on Friday 1/12/2018 without restrictions. If you have any questions or concerns, or if I can be of further assistance, please do not hesitate to contact me.    Sincerely,        Calin Chacko PA-C

## 2018-02-09 RX ORDER — TRAZODONE HYDROCHLORIDE 50 MG/1
TABLET ORAL
Qty: 30 TABLET | Refills: 3 | Status: SHIPPED | OUTPATIENT
Start: 2018-02-09 | End: 2018-03-29 | Stop reason: SDUPTHER

## 2018-03-15 ENCOUNTER — OFFICE VISIT (OUTPATIENT)
Dept: URGENT CARE | Facility: CLINIC | Age: 48
End: 2018-03-15
Payer: COMMERCIAL

## 2018-03-15 VITALS
TEMPERATURE: 99 F | OXYGEN SATURATION: 97 % | HEIGHT: 66 IN | WEIGHT: 150 LBS | RESPIRATION RATE: 12 BRPM | SYSTOLIC BLOOD PRESSURE: 132 MMHG | HEART RATE: 82 BPM | BODY MASS INDEX: 24.11 KG/M2 | DIASTOLIC BLOOD PRESSURE: 78 MMHG

## 2018-03-15 DIAGNOSIS — J02.9 ACUTE PHARYNGITIS, UNSPECIFIED ETIOLOGY: ICD-10-CM

## 2018-03-15 DIAGNOSIS — J02.9 SORE THROAT: Primary | ICD-10-CM

## 2018-03-15 LAB
CTP QC/QA: YES
S PYO RRNA THROAT QL PROBE: NEGATIVE

## 2018-03-15 PROCEDURE — 96372 THER/PROPH/DIAG INJ SC/IM: CPT | Mod: S$GLB,,, | Performed by: FAMILY MEDICINE

## 2018-03-15 PROCEDURE — 87880 STREP A ASSAY W/OPTIC: CPT | Mod: QW,S$GLB,, | Performed by: FAMILY MEDICINE

## 2018-03-15 PROCEDURE — 99213 OFFICE O/P EST LOW 20 MIN: CPT | Mod: 25,S$GLB,, | Performed by: FAMILY MEDICINE

## 2018-03-15 RX ORDER — AZITHROMYCIN 250 MG/1
250 TABLET, FILM COATED ORAL DAILY
Qty: 6 TABLET | Refills: 0 | Status: SHIPPED | OUTPATIENT
Start: 2018-03-15 | End: 2018-03-20

## 2018-03-15 RX ORDER — BETAMETHASONE SODIUM PHOSPHATE AND BETAMETHASONE ACETATE 3; 3 MG/ML; MG/ML
6 INJECTION, SUSPENSION INTRA-ARTICULAR; INTRALESIONAL; INTRAMUSCULAR; SOFT TISSUE
Status: COMPLETED | OUTPATIENT
Start: 2018-03-15 | End: 2018-03-15

## 2018-03-15 RX ADMIN — BETAMETHASONE SODIUM PHOSPHATE AND BETAMETHASONE ACETATE 6 MG: 3; 3 INJECTION, SUSPENSION INTRA-ARTICULAR; INTRALESIONAL; INTRAMUSCULAR; SOFT TISSUE at 01:03

## 2018-03-15 NOTE — PATIENT INSTRUCTIONS
.ou  Pharyngitis (Sore Throat), Report Pending    Pharyngitis (sore throat) is often due to a virus. It can also be caused by the streptococcus, or strep, bacterium, often called strep throat. Both viral and strep infections can cause throat pain that is worse when swallowing, aching all over with headache, and fever. Both types of infections are contagious. They may be spread by coughing, kissing, or touching others after touching your mouth or nose.  A test has been done to find out whether you (or your child, if your child is the patient) have strep throat. Call this facility or your healthcare provider if you were not given your test results. If the test is positive for strep infection, you will need to take antibiotic medicines. A prescription can be called into your pharmacy at that time. If the test is negative, you probably have a viral pharyngitis. This does not need to be treated with antibiotics. Until you receive the results of the strep test, you should stay home from work. If your child is being tested, he or she should stay home from school.  Home care  · Rest at home. Drink plenty of fluids so you won't get dehydrated.  · If the test is positive for strep, don't go to work or school for the first 2 days of taking the antibiotics. After this time, you will not be contagious. You can then return to work or school if you are feeling better.   · Take the antibiotic medicine for the full 10 days, even if you feel better. This is very important to make sure the infection is treated. It is also important to prevent drug-resistant germs from developing. If you were given an antibiotic shot, you won't need more antibiotics.  · For children: Use acetaminophen for fever, fussiness, or discomfort. In infants older than 6 months of age, you may use ibuprofen instead of acetaminophen. Talk with your child's healthcare provider before giving these medicines if your child has chronic liver or kidney disease or ever  had a stomach ulcer or GI bleeding. Never give aspirin to a child under 18 years of age who is ill with a fever. It may cause severe liver damage.  · For adults: Use acetaminophen or ibuprofen to control pain or fever, unless another medicine was prescribed for this. Talk with your healthcare provider before taking these medicines if you have chronic liver or kidney disease or ever had a stomach ulcer or GI bleeding.  · Use throat lozenges or numbing throat sprays to help reduce pain. Gargling with warm salt water will also help reduce throat pain. For this, dissolve 1/2 teaspoon of salt in 1 glass of warm water. To help soothe a sore throat, children can sip on juice or a popsicle. Children 5 years and older can also suck on a lollipop or hard candy.  · Don't eat salty or spicy foods. These can irritate the throat.  Follow-up care  Follow up with your healthcare provider or our staff if you don't get better over the next week.  When to seek medical advice  Call your healthcare provider right away if any of these occur:  · Fever as directed by your healthcare provider. For children, seek care if:  ¨ Your child is of any age and has repeated fevers above 104°F (40°C).  ¨ Your child is younger than 2 years of age and has a fever of 100.4°F (38°C) that continues for more than 1 day.  ¨ Your child is 2 years old or older and has a fever of 100.4°F (38°C) that continues for more than 3 days.  · New or worsening ear pain, sinus pain, or headache  · Painful lumps in the back of neck  · Stiff neck  · Lymph nodes are getting larger  · Inability to swallow liquids, excessive drooling, or inability to open mouth wide due to throat pain  · Signs of dehydration (very dark urine or no urine, sunken eyes, dizziness)  · Trouble breathing or noisy breathing  · Muffled voice  · New rash  · Child appears to be getting sicker  Date Last Reviewed: 4/13/2015  © 6267-2023 [employer]. [address] [place] [address], [place]  [address]. All rights reserved. This information is not intended as a substitute for professional medical care. Always follow your healthcare professional's instructions.

## 2018-03-15 NOTE — PROGRESS NOTES
"Subjective:       Patient ID: Monster Crespo is a 48 y.o. male.    Vitals:  height is 5' 6" (1.676 m) and weight is 68 kg (150 lb). His oral temperature is 99 °F (37.2 °C). His blood pressure is 132/78 and his pulse is 82. His respiration is 12 and oxygen saturation is 97%.     Chief Complaint: Sore Throat    C/o hurts to swallow, no fever, no cough      Sore Throat    This is a new problem. The current episode started yesterday. The problem has been unchanged. There has been no fever. Associated symptoms include congestion. Pertinent negatives include no abdominal pain, coughing, ear pain, headaches, hoarse voice or shortness of breath. He has had no exposure to strep or mono. He has tried acetaminophen for the symptoms. The treatment provided no relief.     Review of Systems   Constitution: Negative for chills, fever and malaise/fatigue.   HENT: Positive for congestion and sore throat. Negative for ear pain and hoarse voice.    Eyes: Negative for discharge and redness.   Cardiovascular: Negative for chest pain, dyspnea on exertion and leg swelling.   Respiratory: Negative for cough, shortness of breath, sputum production and wheezing.    Musculoskeletal: Negative for myalgias.   Gastrointestinal: Negative for abdominal pain and nausea.   Neurological: Negative for headaches.       Objective:      Physical Exam   Constitutional: He is oriented to person, place, and time. He appears well-developed and well-nourished. He is cooperative.  Non-toxic appearance. He does not appear ill. No distress.   HENT:   Head: Normocephalic and atraumatic.   Right Ear: Hearing, tympanic membrane, external ear and ear canal normal.   Left Ear: Hearing, tympanic membrane, external ear and ear canal normal.   Nose: Nose normal. No mucosal edema, rhinorrhea or nasal deformity. No epistaxis. Right sinus exhibits no maxillary sinus tenderness and no frontal sinus tenderness. Left sinus exhibits no maxillary sinus tenderness and no " frontal sinus tenderness.   Mouth/Throat: Uvula is midline, oropharynx is clear and moist and mucous membranes are normal. No trismus in the jaw. Normal dentition. No uvula swelling. No posterior oropharyngeal erythema.   Eyes: Conjunctivae and lids are normal. Right eye exhibits no discharge. Left eye exhibits no discharge. No scleral icterus.   Sclera clear bilat   Neck: Trachea normal, normal range of motion, full passive range of motion without pain and phonation normal. Neck supple.   Cardiovascular: Normal rate, regular rhythm, normal heart sounds, intact distal pulses and normal pulses.  Exam reveals no friction rub.    No murmur heard.  Pulmonary/Chest: Effort normal and breath sounds normal. He has no wheezes.   Abdominal: Soft. Normal appearance and bowel sounds are normal. He exhibits no distension, no pulsatile midline mass and no mass. There is no tenderness.   Musculoskeletal: Normal range of motion. He exhibits no edema or deformity.   Lymphadenopathy:     He has no cervical adenopathy.   Neurological: He is alert and oriented to person, place, and time. He exhibits normal muscle tone. Coordination normal.   Skin: Skin is warm, dry and intact. He is not diaphoretic. No pallor.   Psychiatric: He has a normal mood and affect. His speech is normal and behavior is normal. Judgment and thought content normal. Cognition and memory are normal.   Nursing note and vitals reviewed.      Assessment:       1. Sore throat    2. Acute pharyngitis, unspecified etiology        Plan:         Sore throat  -     POCT rapid strep A    Acute pharyngitis, unspecified etiology    Other orders  -     betamethasone acetate-betamethasone sodium phosphate injection 6 mg; Inject 1 mL (6 mg total) into the muscle one time.  -     azithromycin (ZITHROMAX) 250 MG tablet; Take 1 tablet (250 mg total) by mouth once daily.  Dispense: 6 tablet; Refill: 0        claritin one daily  Motrin 2 po q 6 hrs prn fever  Warm salt water gargles  q 4 hrs

## 2018-03-29 RX ORDER — TRAZODONE HYDROCHLORIDE 50 MG/1
TABLET ORAL
Qty: 30 TABLET | Refills: 3 | Status: SHIPPED | OUTPATIENT
Start: 2018-03-29 | End: 2018-04-10

## 2018-04-06 ENCOUNTER — OFFICE VISIT (OUTPATIENT)
Dept: OPTOMETRY | Facility: CLINIC | Age: 48
End: 2018-04-06
Payer: COMMERCIAL

## 2018-04-06 DIAGNOSIS — H52.4 ASTIGMATISM OF BOTH EYES WITH PRESBYOPIA: Primary | ICD-10-CM

## 2018-04-06 DIAGNOSIS — H52.203 ASTIGMATISM OF BOTH EYES WITH PRESBYOPIA: Primary | ICD-10-CM

## 2018-04-06 PROCEDURE — 99999 PR PBB SHADOW E&M-EST. PATIENT-LVL II: CPT | Mod: PBBFAC,,, | Performed by: OPTOMETRIST

## 2018-04-06 PROCEDURE — 92014 COMPRE OPH EXAM EST PT 1/>: CPT | Mod: S$GLB,,, | Performed by: OPTOMETRIST

## 2018-04-06 PROCEDURE — 92015 DETERMINE REFRACTIVE STATE: CPT | Mod: S$GLB,,, | Performed by: OPTOMETRIST

## 2018-04-06 NOTE — PROGRESS NOTES
HPI     DLS: 4/20/2015  Pt states near [place] has decreased. Does not wear glasses --no problems at   distance  +Eye allergies   Denies f/f    No gtts         Last edited by Gerardo Elizabeth, OD on 4/6/2018 10:24 AM. (History)        ROS     Negative for: Constitutional, Gastrointestinal, Neurological, Skin,   Genitourinary, Musculoskeletal, HENT, Endocrine, Cardiovascular, Eyes,   Respiratory, Psychiatric, Allergic/Imm, Heme/Lymph    Last edited by Gerardo Elizabeth, OD on 4/6/2018 10:24 AM. (History)        Assessment /Plan     For exam results, see Encounter Report.    Astigmatism of both eyes with presbyopia      Astig/presby--wrote spex Rx, or otc readers OK    PLAN:    rtc 1 yr

## 2018-04-10 ENCOUNTER — OFFICE VISIT (OUTPATIENT)
Dept: FAMILY MEDICINE | Facility: CLINIC | Age: 48
End: 2018-04-10
Payer: COMMERCIAL

## 2018-04-10 VITALS
SYSTOLIC BLOOD PRESSURE: 120 MMHG | HEIGHT: 66 IN | DIASTOLIC BLOOD PRESSURE: 72 MMHG | WEIGHT: 156.5 LBS | HEART RATE: 68 BPM | BODY MASS INDEX: 25.15 KG/M2

## 2018-04-10 DIAGNOSIS — G47.00 INSOMNIA, UNSPECIFIED TYPE: Primary | ICD-10-CM

## 2018-04-10 DIAGNOSIS — F32.A DEPRESSION, UNSPECIFIED DEPRESSION TYPE: ICD-10-CM

## 2018-04-10 DIAGNOSIS — L91.8 SKIN TAG: ICD-10-CM

## 2018-04-10 PROCEDURE — 99214 OFFICE O/P EST MOD 30 MIN: CPT | Mod: S$GLB,,, | Performed by: FAMILY MEDICINE

## 2018-04-10 PROCEDURE — 99999 PR PBB SHADOW E&M-EST. PATIENT-LVL III: CPT | Mod: PBBFAC,,, | Performed by: FAMILY MEDICINE

## 2018-04-10 RX ORDER — DOXEPIN HYDROCHLORIDE 100 MG/1
100 CAPSULE ORAL NIGHTLY
Qty: 90 CAPSULE | Refills: 0 | Status: SHIPPED | OUTPATIENT
Start: 2018-04-10 | End: 2018-07-09 | Stop reason: SDUPTHER

## 2018-04-10 NOTE — PATIENT INSTRUCTIONS
Depresión [Depression]  La depresión es hoy mary de los problemas mentales más comunes. No se trata sólo de sentirse avinash o desdichado. Es ryan verdadera enfermedad. La causa parece estar relacionada con ryan disminución de las sustancias químicas que transmiten las señales en el cerebro. Si en antonio faina, hay antecedentes de depresión (depression), alcoholismo (alcoholism), o suicidio (suicide), el riesgo de tener esta enfermedad será mayor. También las enfermedades crónicas (chronic illneses), el dolor crónico (chronic pain), las migrañas (migraine headaches) y un gran estrés emocional (emotional stress) pueden aumentar el riesgo de tenerla.  La depresión puede causar muchos síntomas diferentes; por ejemplo:  -- Pérdida de apetito.  -- [place] en exceso.  -- No poder dormir.  -- Dormir en exceso.  -- Cansancio no relacionado con agotamiento físico.  -- Sentirse inquieto o irritable.  -- Lentitud al moverse o al hablar.  -- Sentirse deprimido o retraído.  -- Perder el interés por las cosas de las que antes disfrutaba.  -- Dificultad para concentrarse, earl memoria, problemas para liam decisiones.  -- Pensamientos acerca hacerse daño a mary mismo o matarse, o pensar que la portillo no du la marvin.  -- [place] autoestima.  El mejor tratamiento para la depresión es ryan combinación de medicamentos y psicoterapia (psychotherapy). Los medicamentos antidepresivos (antidepressants) pueden reducir el sufrimiento y mejorar la capacidad de desempeñarse mientras dure la depresión. La terapia puede ofrecer respaldo emocional y ayudarle a comprender los factores emocionales que podrían estar causándole la depresión.  Cuidados En [place]:  1) Sea yoni con usted mismo. Propóngase hacer cosas de las que disfruta (arreglar el jardín, jimi caminatas en la naturaleza, ir al cine, etc.). Recompénsese por los pequeños éxitos que vaya obteniendo.  2) Ocúpese de cuidar antonio cuerpo físico. Coma ryan dieta equilibrada (con bajo contenido de grasas  saturadas y alto contenido de frutas, verduras y hortalizas). Establezca un plan de ejercicios de, al menos, 3 veces a la semana sylvia 30 minutos. Incluso un ejercicio de suave a moderado (henry caminar a paso enérgico) puede hacer que se sienta mejor.  3) No tome alcohol, ya que puede empeorar la depresión.  Programe ryan VISITA DE CONTROL con antonio médico, según se le haya indicado. Es importante que se mantenga en contacto con un profesional de la galindo hasta que juice síntomas comiencen a mejorar.  Busque Prontamente Atención Médica  si algo de lo siguiente ocurre:  -- Siente ryan depresión extrema, miedo, ansiedad, o radha hacia usted o los demás.  -- Se siente fuera de control.  -- Siente que podría intentar hacerse daño a usted mismo o a otra persona.  -- Oye voces que los demás no oyen.  -- Ve cosas que los demás no anastasia.  -- Hace juan manuel días que no puede dormir ni comer.  Date Last Reviewed: 9/29/2015  © 7647-1637 [employer]. [address] [place] [address], [place] [address]. Todos los derechos reservados. Esta información no pretende sustituir la atención médica profesional. Sólo antonio médico puede diagnosticar y tratar un problema de galindo.

## 2018-04-10 NOTE — PROGRESS NOTES
Subjective:       Patient ID: Monster Crespo is a 48 y.o. male.    Chief Complaint: Insomnia    48 years old male came to the clinic with problems with sleeping associated with depression for the last couple of months.  Patient was using trazodone with no significant improvement.  No suicidal or homicidal ideations.  Patient also with left neck skin tag for the last month.  Patient request possible removal.      Review of Systems   Constitutional: Negative.    HENT: Negative.    Eyes: Negative.    Respiratory: Negative.    Cardiovascular: Negative.    Gastrointestinal: Negative.    Genitourinary: Negative.    Musculoskeletal: Negative.    Skin: Negative.    Neurological: Positive for headaches.   Psychiatric/Behavioral: Positive for sleep disturbance. The patient is nervous/anxious.        Objective:      Physical Exam   Constitutional: He is oriented to person, place, and time. He appears well-developed and well-nourished. No distress.   HENT:   Head: Normocephalic and atraumatic.   Right Ear: External ear normal.   Left Ear: External ear normal.   Nose: Nose normal.   Mouth/Throat: Oropharynx is clear and moist. No oropharyngeal exudate.   Eyes: Conjunctivae and EOM are normal. Pupils are equal, round, and reactive to light. Right eye exhibits no discharge. Left eye exhibits no discharge. No scleral icterus.   Neck: Normal range of motion. Neck supple. No JVD present. No tracheal deviation present. No thyromegaly present.   Cardiovascular: Normal rate, regular rhythm, normal heart sounds and intact distal pulses.  Exam reveals no gallop and no friction rub.    No murmur heard.  Pulmonary/Chest: Effort normal and breath sounds normal. No stridor. No respiratory distress. He has no wheezes. He has no rales. He exhibits no tenderness.   Abdominal: Soft. Bowel sounds are normal. He exhibits no distension and no mass. There is no tenderness. There is no rebound and no guarding.   Musculoskeletal: Normal range of  motion. He exhibits no edema or tenderness.   Lymphadenopathy:     He has no cervical adenopathy.   Neurological: He is alert and oriented to person, place, and time. He has normal reflexes. He displays normal reflexes. No cranial nerve deficit. He exhibits normal muscle tone. Coordination normal.   Skin: Skin is warm and dry. No rash noted. He is not diaphoretic. No erythema. No pallor.   Psychiatric: His behavior is normal. Judgment and thought content normal. His mood appears not anxious. His affect is not angry, not blunt, not labile and not inappropriate. He exhibits a depressed mood.   Nursing note and vitals reviewed.      Assessment:       1. Insomnia, unspecified type    2. Depression, unspecified depression type    3. Skin tag        Plan:         Monster PELAYO was seen today for insomnia.    Diagnoses and all orders for this visit:    Insomnia, unspecified type  -     doxepin (SINEQUAN) 100 MG capsule; Take 1 capsule (100 mg total) by mouth every evening.    Depression, unspecified depression type  -     doxepin (SINEQUAN) 100 MG capsule; Take 1 capsule (100 mg total) by mouth every evening.    Skin tag  -     Cryotherapy; Future

## 2018-07-09 DIAGNOSIS — F32.A DEPRESSION, UNSPECIFIED DEPRESSION TYPE: ICD-10-CM

## 2018-07-09 DIAGNOSIS — G47.00 INSOMNIA, UNSPECIFIED TYPE: ICD-10-CM

## 2018-07-09 RX ORDER — DOXEPIN HYDROCHLORIDE 100 MG/1
100 CAPSULE ORAL NIGHTLY
Qty: 90 CAPSULE | Refills: 0 | Status: SHIPPED | OUTPATIENT
Start: 2018-07-09 | End: 2018-10-17 | Stop reason: SDUPTHER

## 2018-07-10 ENCOUNTER — OFFICE VISIT (OUTPATIENT)
Dept: FAMILY MEDICINE | Facility: CLINIC | Age: 48
End: 2018-07-10
Payer: COMMERCIAL

## 2018-07-10 VITALS
WEIGHT: 160.06 LBS | HEIGHT: 66 IN | DIASTOLIC BLOOD PRESSURE: 70 MMHG | BODY MASS INDEX: 25.72 KG/M2 | OXYGEN SATURATION: 98 % | SYSTOLIC BLOOD PRESSURE: 110 MMHG | HEART RATE: 72 BPM

## 2018-07-10 DIAGNOSIS — E78.5 DYSLIPIDEMIA: ICD-10-CM

## 2018-07-10 DIAGNOSIS — Z00.00 ANNUAL PHYSICAL EXAM: Primary | ICD-10-CM

## 2018-07-10 DIAGNOSIS — F32.A DEPRESSION, UNSPECIFIED DEPRESSION TYPE: ICD-10-CM

## 2018-07-10 DIAGNOSIS — N52.9 ERECTILE DYSFUNCTION, UNSPECIFIED ERECTILE DYSFUNCTION TYPE: ICD-10-CM

## 2018-07-10 DIAGNOSIS — G47.00 INSOMNIA, UNSPECIFIED TYPE: ICD-10-CM

## 2018-07-10 DIAGNOSIS — Z12.5 SCREENING FOR PROSTATE CANCER: ICD-10-CM

## 2018-07-10 PROCEDURE — 99396 PREV VISIT EST AGE 40-64: CPT | Mod: S$GLB,,, | Performed by: FAMILY MEDICINE

## 2018-07-10 PROCEDURE — 99999 PR PBB SHADOW E&M-EST. PATIENT-LVL III: CPT | Mod: PBBFAC,,, | Performed by: FAMILY MEDICINE

## 2018-07-10 NOTE — PROGRESS NOTES
Subjective:       Patient ID: Monster Crespo is a 48 y.o. male.    Chief Complaint: Follow-up (on medication) and Annual Exam    48 years old male came to the clinic for his physical examination.  Patient with chronic insomnia currently taking doxepin with significant improvement.  Patient previously diagnosed with elevated cholesterol.  He wants to be recheck.  Patient reports sometimes erectile dysfunction for the last year .  No recent flare ups of depression .  No suicidal or homicidal ideations.  The      Review of Systems   Constitutional: Negative.    HENT: Negative.    Eyes: Negative.    Respiratory: Negative.    Cardiovascular: Negative.    Gastrointestinal: Negative.    Genitourinary: Negative.    Musculoskeletal: Negative.    Skin: Negative.    Neurological: Negative.    Psychiatric/Behavioral: Positive for sleep disturbance.       Objective:      Physical Exam   Constitutional: He is oriented to person, place, and time. He appears well-developed and well-nourished. No distress.   HENT:   Head: Normocephalic and atraumatic.   Right Ear: External ear normal.   Left Ear: External ear normal.   Nose: Nose normal.   Mouth/Throat: Oropharynx is clear and moist. No oropharyngeal exudate.   Eyes: Conjunctivae and EOM are normal. Pupils are equal, round, and reactive to light. Right eye exhibits no discharge. Left eye exhibits no discharge. No scleral icterus.   Neck: Normal range of motion. Neck supple. No JVD present. No tracheal deviation present. No thyromegaly present.   Cardiovascular: Normal rate, regular rhythm, normal heart sounds and intact distal pulses.  Exam reveals no gallop and no friction rub.    No murmur heard.  Pulmonary/Chest: Effort normal and breath sounds normal. No stridor. No respiratory distress. He has no wheezes. He has no rales. He exhibits no tenderness.   Abdominal: Soft. Bowel sounds are normal. He exhibits no distension and no mass. There is no tenderness. There is no rebound and  no guarding.   Musculoskeletal: Normal range of motion. He exhibits no edema or tenderness.   Lymphadenopathy:     He has no cervical adenopathy.   Neurological: He is alert and oriented to person, place, and time. He has normal reflexes. He displays normal reflexes. No cranial nerve deficit. He exhibits normal muscle tone. Coordination normal.   Skin: Skin is warm and dry. No rash noted. He is not diaphoretic. No erythema. No pallor.   Psychiatric: He has a normal mood and affect. His behavior is normal. Judgment and thought content normal.   Nursing note and vitals reviewed.      Assessment:       1. Annual physical exam    2. Depression, unspecified depression type    3. Dyslipidemia    4. Insomnia, unspecified type    5. Screening for prostate cancer    6. Erectile dysfunction, unspecified erectile dysfunction type        Plan:         Monster PELAYO was seen today for follow-up and annual exam.    Diagnoses and all orders for this visit:    Annual physical exam  -     Comprehensive metabolic panel; Future  -     Lipid panel; Future  -     Urinalysis; Future  -     TSH; Future  -     CBC auto differential; Future    Depression, unspecified depression type  -     Comprehensive metabolic panel; Future  -     TSH; Future    Dyslipidemia  -     Comprehensive metabolic panel; Future  -     Lipid panel; Future  -     TSH; Future    Insomnia, unspecified type  -     TSH; Future    Screening for prostate cancer  -     PSA, Screening; Future    Erectile dysfunction, unspecified erectile dysfunction type  -     Testosterone, free; Future     Sleep hygiene.

## 2018-07-14 ENCOUNTER — LAB VISIT (OUTPATIENT)
Dept: LAB | Facility: HOSPITAL | Age: 48
End: 2018-07-14
Attending: FAMILY MEDICINE
Payer: COMMERCIAL

## 2018-07-14 DIAGNOSIS — Z00.00 ANNUAL PHYSICAL EXAM: ICD-10-CM

## 2018-07-14 LAB
BILIRUB UR QL STRIP: NEGATIVE
CLARITY UR: CLEAR
COLOR UR: YELLOW
GLUCOSE UR QL STRIP: NEGATIVE
HGB UR QL STRIP: NEGATIVE
KETONES UR QL STRIP: NEGATIVE
LEUKOCYTE ESTERASE UR QL STRIP: NEGATIVE
NITRITE UR QL STRIP: NEGATIVE
PH UR STRIP: 6 [PH] (ref 5–8)
PROT UR QL STRIP: NEGATIVE
SP GR UR STRIP: 1.01 (ref 1–1.03)
URN SPEC COLLECT METH UR: NORMAL
UROBILINOGEN UR STRIP-ACNC: NEGATIVE EU/DL

## 2018-07-14 PROCEDURE — 81003 URINALYSIS AUTO W/O SCOPE: CPT

## 2018-10-17 DIAGNOSIS — F32.A DEPRESSION, UNSPECIFIED DEPRESSION TYPE: ICD-10-CM

## 2018-10-17 DIAGNOSIS — G47.00 INSOMNIA, UNSPECIFIED TYPE: ICD-10-CM

## 2018-10-17 RX ORDER — DOXEPIN HYDROCHLORIDE 100 MG/1
100 CAPSULE ORAL NIGHTLY
Qty: 90 CAPSULE | Refills: 0 | Status: SHIPPED | OUTPATIENT
Start: 2018-10-17 | End: 2019-01-12 | Stop reason: SDUPTHER

## 2019-01-12 DIAGNOSIS — G47.00 INSOMNIA, UNSPECIFIED TYPE: ICD-10-CM

## 2019-01-12 DIAGNOSIS — F32.A DEPRESSION, UNSPECIFIED DEPRESSION TYPE: ICD-10-CM

## 2019-01-13 RX ORDER — DOXEPIN HYDROCHLORIDE 100 MG/1
100 CAPSULE ORAL NIGHTLY
Qty: 90 CAPSULE | Refills: 3 | Status: SHIPPED | OUTPATIENT
Start: 2019-01-13 | End: 2019-12-18 | Stop reason: SDUPTHER

## 2019-03-08 DIAGNOSIS — Z12.11 COLON CANCER SCREENING: ICD-10-CM

## 2019-04-26 ENCOUNTER — OFFICE VISIT (OUTPATIENT)
Dept: OPTOMETRY | Facility: CLINIC | Age: 49
End: 2019-04-26
Payer: COMMERCIAL

## 2019-04-26 DIAGNOSIS — H11.31 CONJUNCTIVAL HEMORRHAGE, RIGHT: Primary | ICD-10-CM

## 2019-04-26 PROCEDURE — 99999 PR PBB SHADOW E&M-EST. PATIENT-LVL II: ICD-10-PCS | Mod: PBBFAC,,, | Performed by: OPTOMETRIST

## 2019-04-26 PROCEDURE — 92014 PR EYE EXAM, EST PATIENT,COMPREHESV: ICD-10-PCS | Mod: S$GLB,,, | Performed by: OPTOMETRIST

## 2019-04-26 PROCEDURE — 92014 COMPRE OPH EXAM EST PT 1/>: CPT | Mod: S$GLB,,, | Performed by: OPTOMETRIST

## 2019-04-26 PROCEDURE — 99999 PR PBB SHADOW E&M-EST. PATIENT-LVL II: CPT | Mod: PBBFAC,,, | Performed by: OPTOMETRIST

## 2019-04-26 NOTE — PROGRESS NOTES
"HPI     Pt reports 2 episodes of "blood in eye" OD in the past month.  No pain or   vision changes    Last edited by Gerardo Elizabeth, OD on 4/26/2019 11:01 AM. (History)        ROS     Negative for: Constitutional, Gastrointestinal, Neurological, Skin,   Genitourinary, Musculoskeletal, HENT, Endocrine, Cardiovascular, Eyes,   Respiratory, Psychiatric, Allergic/Imm, Heme/Lymph    Last edited by Gerardo Elizabeth, OD on 4/26/2019 11:01 AM. (History)        Assessment /Plan     For exam results, see Encounter Report.    Conjunctival hemorrhage, right      Subconjunctival Heme OD.  Without foreign body noted. (pt notes he has been doing a lot of sit-up recently, and straining) Pt reassurance given about resolution.  Advised ATs QID     PLAN:    Pt will call if sx persist, otherwise rtc for routine                 "

## 2019-12-18 DIAGNOSIS — G47.00 INSOMNIA, UNSPECIFIED TYPE: ICD-10-CM

## 2019-12-18 DIAGNOSIS — F32.A DEPRESSION, UNSPECIFIED DEPRESSION TYPE: ICD-10-CM

## 2019-12-18 RX ORDER — DOXEPIN HYDROCHLORIDE 100 MG/1
100 CAPSULE ORAL NIGHTLY
Qty: 30 CAPSULE | Refills: 11 | Status: SHIPPED | OUTPATIENT
Start: 2019-12-18 | End: 2020-12-29 | Stop reason: SDUPTHER

## 2020-03-18 ENCOUNTER — PATIENT OUTREACH (OUTPATIENT)
Dept: ADMINISTRATIVE | Facility: OTHER | Age: 50
End: 2020-03-18

## 2020-03-24 ENCOUNTER — PATIENT OUTREACH (OUTPATIENT)
Dept: ADMINISTRATIVE | Facility: OTHER | Age: 50
End: 2020-03-24

## 2020-04-28 ENCOUNTER — TELEPHONE (OUTPATIENT)
Dept: GASTROENTEROLOGY | Facility: CLINIC | Age: 50
End: 2020-04-28

## 2020-04-28 NOTE — TELEPHONE ENCOUNTER
I called patient to switch him to a virtual visit, but he said he rather wait until he can be seen in clinic.

## 2020-10-05 ENCOUNTER — PATIENT MESSAGE (OUTPATIENT)
Dept: ADMINISTRATIVE | Facility: HOSPITAL | Age: 50
End: 2020-10-05

## 2021-01-05 ENCOUNTER — PATIENT MESSAGE (OUTPATIENT)
Dept: ADMINISTRATIVE | Facility: HOSPITAL | Age: 51
End: 2021-01-05

## 2021-03-04 ENCOUNTER — OFFICE VISIT (OUTPATIENT)
Dept: UROLOGY | Facility: CLINIC | Age: 51
End: 2021-03-04
Payer: COMMERCIAL

## 2021-03-04 VITALS
DIASTOLIC BLOOD PRESSURE: 87 MMHG | HEART RATE: 76 BPM | SYSTOLIC BLOOD PRESSURE: 137 MMHG | WEIGHT: 162.5 LBS | BODY MASS INDEX: 26.12 KG/M2 | HEIGHT: 66 IN

## 2021-03-04 DIAGNOSIS — R10.9 FLANK PAIN: ICD-10-CM

## 2021-03-04 DIAGNOSIS — N20.0 NEPHROLITHIASIS: Primary | ICD-10-CM

## 2021-03-04 DIAGNOSIS — Z87.442 HISTORY OF NEPHROLITHIASIS: ICD-10-CM

## 2021-03-04 LAB
BILIRUB SERPL-MCNC: NORMAL MG/DL
BLOOD URINE, POC: NORMAL
CLARITY, POC UA: CLEAR
COLOR, POC UA: NORMAL
GLUCOSE UR QL STRIP: NORMAL
KETONES UR QL STRIP: NORMAL
LEUKOCYTE ESTERASE URINE, POC: NORMAL
NITRITE, POC UA: NORMAL
PH, POC UA: 5
PROTEIN, POC: NORMAL
SPECIFIC GRAVITY, POC UA: 1
UROBILINOGEN, POC UA: NORMAL

## 2021-03-04 PROCEDURE — 81002 POCT URINE DIPSTICK WITHOUT MICROSCOPE: ICD-10-PCS | Mod: S$GLB,,, | Performed by: STUDENT IN AN ORGANIZED HEALTH CARE EDUCATION/TRAINING PROGRAM

## 2021-03-04 PROCEDURE — 1125F AMNT PAIN NOTED PAIN PRSNT: CPT | Mod: S$GLB,,, | Performed by: STUDENT IN AN ORGANIZED HEALTH CARE EDUCATION/TRAINING PROGRAM

## 2021-03-04 PROCEDURE — 3008F PR BODY MASS INDEX (BMI) DOCUMENTED: ICD-10-PCS | Mod: CPTII,S$GLB,, | Performed by: STUDENT IN AN ORGANIZED HEALTH CARE EDUCATION/TRAINING PROGRAM

## 2021-03-04 PROCEDURE — 1125F PR PAIN SEVERITY QUANTIFIED, PAIN PRESENT: ICD-10-PCS | Mod: S$GLB,,, | Performed by: STUDENT IN AN ORGANIZED HEALTH CARE EDUCATION/TRAINING PROGRAM

## 2021-03-04 PROCEDURE — 99999 PR PBB SHADOW E&M-EST. PATIENT-LVL III: ICD-10-PCS | Mod: PBBFAC,,, | Performed by: STUDENT IN AN ORGANIZED HEALTH CARE EDUCATION/TRAINING PROGRAM

## 2021-03-04 PROCEDURE — 99204 PR OFFICE/OUTPT VISIT, NEW, LEVL IV, 45-59 MIN: ICD-10-PCS | Mod: 25,S$GLB,, | Performed by: STUDENT IN AN ORGANIZED HEALTH CARE EDUCATION/TRAINING PROGRAM

## 2021-03-04 PROCEDURE — 99999 PR PBB SHADOW E&M-EST. PATIENT-LVL III: CPT | Mod: PBBFAC,,, | Performed by: STUDENT IN AN ORGANIZED HEALTH CARE EDUCATION/TRAINING PROGRAM

## 2021-03-04 PROCEDURE — 81002 URINALYSIS NONAUTO W/O SCOPE: CPT | Mod: S$GLB,,, | Performed by: STUDENT IN AN ORGANIZED HEALTH CARE EDUCATION/TRAINING PROGRAM

## 2021-03-04 PROCEDURE — 3008F BODY MASS INDEX DOCD: CPT | Mod: CPTII,S$GLB,, | Performed by: STUDENT IN AN ORGANIZED HEALTH CARE EDUCATION/TRAINING PROGRAM

## 2021-03-04 PROCEDURE — 99204 OFFICE O/P NEW MOD 45 MIN: CPT | Mod: 25,S$GLB,, | Performed by: STUDENT IN AN ORGANIZED HEALTH CARE EDUCATION/TRAINING PROGRAM

## 2021-03-11 ENCOUNTER — HOSPITAL ENCOUNTER (OUTPATIENT)
Dept: RADIOLOGY | Facility: HOSPITAL | Age: 51
Discharge: HOME | End: 2021-03-11
Attending: STUDENT IN AN ORGANIZED HEALTH CARE EDUCATION/TRAINING PROGRAM
Payer: COMMERCIAL

## 2021-03-11 DIAGNOSIS — N20.0 NEPHROLITHIASIS: ICD-10-CM

## 2021-03-11 PROCEDURE — 74176 CT ABD & PELVIS W/O CONTRAST: CPT | Mod: TC

## 2021-03-11 PROCEDURE — 74176 CT ABDOMEN PELVIS WITHOUT CONTRAST: ICD-10-PCS | Mod: 26,,, | Performed by: RADIOLOGY

## 2021-03-11 PROCEDURE — 74176 CT ABD & PELVIS W/O CONTRAST: CPT | Mod: 26,,, | Performed by: RADIOLOGY

## 2021-03-12 ENCOUNTER — TELEPHONE (OUTPATIENT)
Dept: FAMILY MEDICINE | Facility: CLINIC | Age: 51
End: 2021-03-12

## 2021-03-12 ENCOUNTER — DOCUMENTATION ONLY (OUTPATIENT)
Dept: TRANSPLANT | Facility: CLINIC | Age: 51
End: 2021-03-12

## 2021-03-12 DIAGNOSIS — K76.89 LIVER CYST: Primary | ICD-10-CM

## 2021-03-18 ENCOUNTER — OFFICE VISIT (OUTPATIENT)
Dept: HEPATOLOGY | Facility: CLINIC | Age: 51
End: 2021-03-18
Payer: COMMERCIAL

## 2021-03-18 VITALS
HEIGHT: 66 IN | RESPIRATION RATE: 20 BRPM | BODY MASS INDEX: 26.43 KG/M2 | SYSTOLIC BLOOD PRESSURE: 145 MMHG | OXYGEN SATURATION: 98 % | HEART RATE: 79 BPM | TEMPERATURE: 98 F | DIASTOLIC BLOOD PRESSURE: 85 MMHG | WEIGHT: 164.44 LBS

## 2021-03-18 DIAGNOSIS — K76.9 LIVER DISEASE, UNSPECIFIED: ICD-10-CM

## 2021-03-18 DIAGNOSIS — K76.89 LIVER CYST: ICD-10-CM

## 2021-03-18 PROCEDURE — 1126F PR PAIN SEVERITY QUANTIFIED, NO PAIN PRESENT: ICD-10-PCS | Mod: S$GLB,,, | Performed by: STUDENT IN AN ORGANIZED HEALTH CARE EDUCATION/TRAINING PROGRAM

## 2021-03-18 PROCEDURE — 99999 PR PBB SHADOW E&M-EST. PATIENT-LVL IV: ICD-10-PCS | Mod: PBBFAC,,, | Performed by: STUDENT IN AN ORGANIZED HEALTH CARE EDUCATION/TRAINING PROGRAM

## 2021-03-18 PROCEDURE — 1126F AMNT PAIN NOTED NONE PRSNT: CPT | Mod: S$GLB,,, | Performed by: STUDENT IN AN ORGANIZED HEALTH CARE EDUCATION/TRAINING PROGRAM

## 2021-03-18 PROCEDURE — 99999 PR PBB SHADOW E&M-EST. PATIENT-LVL IV: CPT | Mod: PBBFAC,,, | Performed by: STUDENT IN AN ORGANIZED HEALTH CARE EDUCATION/TRAINING PROGRAM

## 2021-03-18 PROCEDURE — 3008F BODY MASS INDEX DOCD: CPT | Mod: CPTII,S$GLB,, | Performed by: STUDENT IN AN ORGANIZED HEALTH CARE EDUCATION/TRAINING PROGRAM

## 2021-03-18 PROCEDURE — 99203 OFFICE O/P NEW LOW 30 MIN: CPT | Mod: S$GLB,,, | Performed by: STUDENT IN AN ORGANIZED HEALTH CARE EDUCATION/TRAINING PROGRAM

## 2021-03-18 PROCEDURE — 3008F PR BODY MASS INDEX (BMI) DOCUMENTED: ICD-10-PCS | Mod: CPTII,S$GLB,, | Performed by: STUDENT IN AN ORGANIZED HEALTH CARE EDUCATION/TRAINING PROGRAM

## 2021-03-18 PROCEDURE — 99203 PR OFFICE/OUTPT VISIT, NEW, LEVL III, 30-44 MIN: ICD-10-PCS | Mod: S$GLB,,, | Performed by: STUDENT IN AN ORGANIZED HEALTH CARE EDUCATION/TRAINING PROGRAM

## 2021-03-25 ENCOUNTER — HOSPITAL ENCOUNTER (OUTPATIENT)
Dept: RADIOLOGY | Facility: HOSPITAL | Age: 51
Discharge: HOME | End: 2021-03-25
Attending: STUDENT IN AN ORGANIZED HEALTH CARE EDUCATION/TRAINING PROGRAM
Payer: COMMERCIAL

## 2021-03-25 DIAGNOSIS — K76.89 LIVER CYST: ICD-10-CM

## 2021-03-25 DIAGNOSIS — K76.9 LIVER DISEASE, UNSPECIFIED: ICD-10-CM

## 2021-03-25 LAB
CREAT SERPL-MCNC: 0.9 MG/DL (ref 0.5–1.4)
SAMPLE: NORMAL

## 2021-03-25 PROCEDURE — 74170 CT ABD WO CNTRST FLWD CNTRST: CPT | Mod: TC

## 2021-03-25 PROCEDURE — 74170 CT ABDOMEN W WO CONTRAST: ICD-10-PCS | Mod: 26,,, | Performed by: RADIOLOGY

## 2021-03-25 PROCEDURE — 25500020 PHARM REV CODE 255: Performed by: STUDENT IN AN ORGANIZED HEALTH CARE EDUCATION/TRAINING PROGRAM

## 2021-03-25 PROCEDURE — 74170 CT ABD WO CNTRST FLWD CNTRST: CPT | Mod: 26,,, | Performed by: RADIOLOGY

## 2021-03-25 PROCEDURE — A9698 NON-RAD CONTRAST MATERIALNOC: HCPCS | Performed by: STUDENT IN AN ORGANIZED HEALTH CARE EDUCATION/TRAINING PROGRAM

## 2021-03-25 RX ADMIN — IOHEXOL 75 ML: 350 INJECTION, SOLUTION INTRAVENOUS at 12:03

## 2021-03-25 RX ADMIN — IOHEXOL 1000 ML: 9 SOLUTION ORAL at 10:03

## 2021-04-05 ENCOUNTER — PATIENT MESSAGE (OUTPATIENT)
Dept: ADMINISTRATIVE | Facility: HOSPITAL | Age: 51
End: 2021-04-05

## 2021-05-03 ENCOUNTER — OFFICE VISIT (OUTPATIENT)
Dept: FAMILY MEDICINE | Facility: CLINIC | Age: 51
End: 2021-05-03
Payer: COMMERCIAL

## 2021-05-03 ENCOUNTER — LAB VISIT (OUTPATIENT)
Dept: LAB | Facility: HOSPITAL | Age: 51
End: 2021-05-03
Attending: FAMILY MEDICINE
Payer: COMMERCIAL

## 2021-05-03 ENCOUNTER — PATIENT MESSAGE (OUTPATIENT)
Dept: GASTROENTEROLOGY | Facility: CLINIC | Age: 51
End: 2021-05-03

## 2021-05-03 ENCOUNTER — TELEPHONE (OUTPATIENT)
Dept: GASTROENTEROLOGY | Facility: CLINIC | Age: 51
End: 2021-05-03

## 2021-05-03 VITALS
BODY MASS INDEX: 26.26 KG/M2 | OXYGEN SATURATION: 98 % | WEIGHT: 162.69 LBS | DIASTOLIC BLOOD PRESSURE: 82 MMHG | HEART RATE: 68 BPM | SYSTOLIC BLOOD PRESSURE: 120 MMHG

## 2021-05-03 DIAGNOSIS — Z12.5 SCREENING FOR PROSTATE CANCER: ICD-10-CM

## 2021-05-03 DIAGNOSIS — Z00.00 ANNUAL PHYSICAL EXAM: ICD-10-CM

## 2021-05-03 DIAGNOSIS — Z12.11 SCREEN FOR COLON CANCER: ICD-10-CM

## 2021-05-03 DIAGNOSIS — Z11.4 ENCOUNTER FOR SCREENING FOR HIV: ICD-10-CM

## 2021-05-03 DIAGNOSIS — Z00.00 ANNUAL PHYSICAL EXAM: Primary | ICD-10-CM

## 2021-05-03 LAB
ALBUMIN SERPL BCP-MCNC: 3.7 G/DL (ref 3.5–5.2)
ALP SERPL-CCNC: 63 U/L (ref 55–135)
ALT SERPL W/O P-5'-P-CCNC: 33 U/L (ref 10–44)
ANION GAP SERPL CALC-SCNC: 7 MMOL/L (ref 8–16)
AST SERPL-CCNC: 23 U/L (ref 10–40)
BASOPHILS # BLD AUTO: 0.04 K/UL (ref 0–0.2)
BASOPHILS NFR BLD: 0.6 % (ref 0–1.9)
BILIRUB SERPL-MCNC: 0.5 MG/DL (ref 0.1–1)
BUN SERPL-MCNC: 13 MG/DL (ref 6–20)
CALCIUM SERPL-MCNC: 9.3 MG/DL (ref 8.7–10.5)
CHLORIDE SERPL-SCNC: 105 MMOL/L (ref 95–110)
CHOLEST SERPL-MCNC: 205 MG/DL (ref 120–199)
CHOLEST/HDLC SERPL: 5.5 {RATIO} (ref 2–5)
CO2 SERPL-SCNC: 27 MMOL/L (ref 23–29)
COMPLEXED PSA SERPL-MCNC: 0.67 NG/ML (ref 0–4)
CREAT SERPL-MCNC: 0.9 MG/DL (ref 0.5–1.4)
DIFFERENTIAL METHOD: ABNORMAL
EOSINOPHIL # BLD AUTO: 0.3 K/UL (ref 0–0.5)
EOSINOPHIL NFR BLD: 4.7 % (ref 0–8)
ERYTHROCYTE [DISTWIDTH] IN BLOOD BY AUTOMATED COUNT: 14.4 % (ref 11.5–14.5)
EST. GFR  (AFRICAN AMERICAN): >60 ML/MIN/1.73 M^2
EST. GFR  (NON AFRICAN AMERICAN): >60 ML/MIN/1.73 M^2
GLUCOSE SERPL-MCNC: 97 MG/DL (ref 70–110)
HCT VFR BLD AUTO: 47.4 % (ref 40–54)
HDLC SERPL-MCNC: 37 MG/DL (ref 40–75)
HDLC SERPL: 18 % (ref 20–50)
HGB BLD-MCNC: 15.2 G/DL (ref 14–18)
IMM GRANULOCYTES # BLD AUTO: 0.05 K/UL (ref 0–0.04)
IMM GRANULOCYTES NFR BLD AUTO: 0.8 % (ref 0–0.5)
LDLC SERPL CALC-MCNC: 137 MG/DL (ref 63–159)
LYMPHOCYTES # BLD AUTO: 2.1 K/UL (ref 1–4.8)
LYMPHOCYTES NFR BLD: 33.4 % (ref 18–48)
MCH RBC QN AUTO: 29 PG (ref 27–31)
MCHC RBC AUTO-ENTMCNC: 32.1 G/DL (ref 32–36)
MCV RBC AUTO: 91 FL (ref 82–98)
MONOCYTES # BLD AUTO: 0.5 K/UL (ref 0.3–1)
MONOCYTES NFR BLD: 8.3 % (ref 4–15)
NEUTROPHILS # BLD AUTO: 3.2 K/UL (ref 1.8–7.7)
NEUTROPHILS NFR BLD: 52.2 % (ref 38–73)
NONHDLC SERPL-MCNC: 168 MG/DL
NRBC BLD-RTO: 0 /100 WBC
PLATELET # BLD AUTO: 307 K/UL (ref 150–450)
PMV BLD AUTO: 10.4 FL (ref 9.2–12.9)
POTASSIUM SERPL-SCNC: 4.4 MMOL/L (ref 3.5–5.1)
PROT SERPL-MCNC: 7.1 G/DL (ref 6–8.4)
RBC # BLD AUTO: 5.24 M/UL (ref 4.6–6.2)
SODIUM SERPL-SCNC: 139 MMOL/L (ref 136–145)
TRIGL SERPL-MCNC: 155 MG/DL (ref 30–150)
TSH SERPL DL<=0.005 MIU/L-ACNC: 3.41 UIU/ML (ref 0.4–4)
WBC # BLD AUTO: 6.17 K/UL (ref 3.9–12.7)

## 2021-05-03 PROCEDURE — 84153 ASSAY OF PSA TOTAL: CPT | Performed by: FAMILY MEDICINE

## 2021-05-03 PROCEDURE — 1126F PR PAIN SEVERITY QUANTIFIED, NO PAIN PRESENT: ICD-10-PCS | Mod: S$GLB,,, | Performed by: FAMILY MEDICINE

## 2021-05-03 PROCEDURE — 99396 PR PREVENTIVE VISIT,EST,40-64: ICD-10-PCS | Mod: S$GLB,,, | Performed by: FAMILY MEDICINE

## 2021-05-03 PROCEDURE — 85025 COMPLETE CBC W/AUTO DIFF WBC: CPT | Performed by: FAMILY MEDICINE

## 2021-05-03 PROCEDURE — 36415 COLL VENOUS BLD VENIPUNCTURE: CPT | Mod: PO | Performed by: FAMILY MEDICINE

## 2021-05-03 PROCEDURE — 80061 LIPID PANEL: CPT | Performed by: FAMILY MEDICINE

## 2021-05-03 PROCEDURE — 99396 PREV VISIT EST AGE 40-64: CPT | Mod: S$GLB,,, | Performed by: FAMILY MEDICINE

## 2021-05-03 PROCEDURE — 84443 ASSAY THYROID STIM HORMONE: CPT | Performed by: FAMILY MEDICINE

## 2021-05-03 PROCEDURE — 3008F BODY MASS INDEX DOCD: CPT | Mod: CPTII,S$GLB,, | Performed by: FAMILY MEDICINE

## 2021-05-03 PROCEDURE — 1126F AMNT PAIN NOTED NONE PRSNT: CPT | Mod: S$GLB,,, | Performed by: FAMILY MEDICINE

## 2021-05-03 PROCEDURE — 99999 PR PBB SHADOW E&M-EST. PATIENT-LVL III: ICD-10-PCS | Mod: PBBFAC,,, | Performed by: FAMILY MEDICINE

## 2021-05-03 PROCEDURE — 86703 HIV-1/HIV-2 1 RESULT ANTBDY: CPT | Performed by: FAMILY MEDICINE

## 2021-05-03 PROCEDURE — 99999 PR PBB SHADOW E&M-EST. PATIENT-LVL III: CPT | Mod: PBBFAC,,, | Performed by: FAMILY MEDICINE

## 2021-05-03 PROCEDURE — 80053 COMPREHEN METABOLIC PANEL: CPT | Performed by: FAMILY MEDICINE

## 2021-05-03 PROCEDURE — 3008F PR BODY MASS INDEX (BMI) DOCUMENTED: ICD-10-PCS | Mod: CPTII,S$GLB,, | Performed by: FAMILY MEDICINE

## 2021-05-04 LAB — HIV 1+2 AB+HIV1 P24 AG SERPL QL IA: NEGATIVE

## 2021-06-29 ENCOUNTER — OFFICE VISIT (OUTPATIENT)
Dept: FAMILY MEDICINE | Facility: CLINIC | Age: 51
End: 2021-06-29
Payer: COMMERCIAL

## 2021-06-29 VITALS
BODY MASS INDEX: 25.79 KG/M2 | HEART RATE: 80 BPM | OXYGEN SATURATION: 98 % | DIASTOLIC BLOOD PRESSURE: 76 MMHG | WEIGHT: 160.5 LBS | HEIGHT: 66 IN | SYSTOLIC BLOOD PRESSURE: 112 MMHG

## 2021-06-29 DIAGNOSIS — E78.5 DYSLIPIDEMIA: Primary | ICD-10-CM

## 2021-06-29 DIAGNOSIS — K29.30 CHRONIC SUPERFICIAL GASTRITIS WITHOUT BLEEDING: ICD-10-CM

## 2021-06-29 PROCEDURE — 99999 PR PBB SHADOW E&M-EST. PATIENT-LVL III: ICD-10-PCS | Mod: PBBFAC,,, | Performed by: FAMILY MEDICINE

## 2021-06-29 PROCEDURE — 3008F PR BODY MASS INDEX (BMI) DOCUMENTED: ICD-10-PCS | Mod: CPTII,S$GLB,, | Performed by: FAMILY MEDICINE

## 2021-06-29 PROCEDURE — 3008F BODY MASS INDEX DOCD: CPT | Mod: CPTII,S$GLB,, | Performed by: FAMILY MEDICINE

## 2021-06-29 PROCEDURE — 99214 OFFICE O/P EST MOD 30 MIN: CPT | Mod: S$GLB,,, | Performed by: FAMILY MEDICINE

## 2021-06-29 PROCEDURE — 99214 PR OFFICE/OUTPT VISIT, EST, LEVL IV, 30-39 MIN: ICD-10-PCS | Mod: S$GLB,,, | Performed by: FAMILY MEDICINE

## 2021-06-29 PROCEDURE — 99999 PR PBB SHADOW E&M-EST. PATIENT-LVL III: CPT | Mod: PBBFAC,,, | Performed by: FAMILY MEDICINE

## 2021-06-29 PROCEDURE — 1126F PR PAIN SEVERITY QUANTIFIED, NO PAIN PRESENT: ICD-10-PCS | Mod: S$GLB,,, | Performed by: FAMILY MEDICINE

## 2021-06-29 PROCEDURE — 1126F AMNT PAIN NOTED NONE PRSNT: CPT | Mod: S$GLB,,, | Performed by: FAMILY MEDICINE

## 2021-06-29 RX ORDER — ATORVASTATIN CALCIUM 20 MG/1
20 TABLET, FILM COATED ORAL NIGHTLY
Qty: 90 TABLET | Refills: 3 | Status: SHIPPED | OUTPATIENT
Start: 2021-06-29 | End: 2021-10-29 | Stop reason: SDUPTHER

## 2021-06-29 RX ORDER — PANTOPRAZOLE SODIUM 40 MG/1
40 TABLET, DELAYED RELEASE ORAL
Qty: 30 TABLET | Refills: 0 | Status: SHIPPED | OUTPATIENT
Start: 2021-06-29 | End: 2021-07-23

## 2021-07-14 ENCOUNTER — TELEPHONE (OUTPATIENT)
Dept: GASTROENTEROLOGY | Facility: CLINIC | Age: 51
End: 2021-07-14

## 2021-10-25 ENCOUNTER — LAB VISIT (OUTPATIENT)
Dept: LAB | Facility: HOSPITAL | Age: 51
End: 2021-10-25
Attending: FAMILY MEDICINE
Payer: COMMERCIAL

## 2021-10-25 DIAGNOSIS — E78.5 DYSLIPIDEMIA: ICD-10-CM

## 2021-10-25 LAB
ALBUMIN SERPL BCP-MCNC: 3.6 G/DL (ref 3.5–5.2)
ALP SERPL-CCNC: 63 U/L (ref 55–135)
ALT SERPL W/O P-5'-P-CCNC: 35 U/L (ref 10–44)
ANION GAP SERPL CALC-SCNC: 7 MMOL/L (ref 8–16)
AST SERPL-CCNC: 28 U/L (ref 10–40)
BILIRUB SERPL-MCNC: 0.4 MG/DL (ref 0.1–1)
BUN SERPL-MCNC: 14 MG/DL (ref 6–20)
CALCIUM SERPL-MCNC: 9.5 MG/DL (ref 8.7–10.5)
CHLORIDE SERPL-SCNC: 105 MMOL/L (ref 95–110)
CHOLEST SERPL-MCNC: 190 MG/DL (ref 120–199)
CHOLEST/HDLC SERPL: 4.9 {RATIO} (ref 2–5)
CO2 SERPL-SCNC: 27 MMOL/L (ref 23–29)
CREAT SERPL-MCNC: 0.9 MG/DL (ref 0.5–1.4)
EST. GFR  (AFRICAN AMERICAN): >60 ML/MIN/1.73 M^2
EST. GFR  (NON AFRICAN AMERICAN): >60 ML/MIN/1.73 M^2
GLUCOSE SERPL-MCNC: 97 MG/DL (ref 70–110)
HDLC SERPL-MCNC: 39 MG/DL (ref 40–75)
HDLC SERPL: 20.5 % (ref 20–50)
LDLC SERPL CALC-MCNC: 116.2 MG/DL (ref 63–159)
NONHDLC SERPL-MCNC: 151 MG/DL
POTASSIUM SERPL-SCNC: 3.5 MMOL/L (ref 3.5–5.1)
PROT SERPL-MCNC: 6.6 G/DL (ref 6–8.4)
SODIUM SERPL-SCNC: 139 MMOL/L (ref 136–145)
TRIGL SERPL-MCNC: 174 MG/DL (ref 30–150)

## 2021-10-25 PROCEDURE — 36415 COLL VENOUS BLD VENIPUNCTURE: CPT | Mod: PO | Performed by: FAMILY MEDICINE

## 2021-10-25 PROCEDURE — 80053 COMPREHEN METABOLIC PANEL: CPT | Performed by: FAMILY MEDICINE

## 2021-10-25 PROCEDURE — 80061 LIPID PANEL: CPT | Performed by: FAMILY MEDICINE

## 2021-10-29 ENCOUNTER — OFFICE VISIT (OUTPATIENT)
Dept: FAMILY MEDICINE | Facility: CLINIC | Age: 51
End: 2021-10-29
Payer: COMMERCIAL

## 2021-10-29 VITALS
HEART RATE: 76 BPM | SYSTOLIC BLOOD PRESSURE: 128 MMHG | BODY MASS INDEX: 26.68 KG/M2 | OXYGEN SATURATION: 98 % | DIASTOLIC BLOOD PRESSURE: 82 MMHG | HEIGHT: 66 IN | WEIGHT: 166 LBS

## 2021-10-29 DIAGNOSIS — Z23 NEED FOR INFLUENZA VACCINATION: ICD-10-CM

## 2021-10-29 DIAGNOSIS — F32.A DEPRESSION, UNSPECIFIED DEPRESSION TYPE: ICD-10-CM

## 2021-10-29 DIAGNOSIS — Z12.11 SCREEN FOR COLON CANCER: ICD-10-CM

## 2021-10-29 DIAGNOSIS — G47.00 INSOMNIA, UNSPECIFIED TYPE: ICD-10-CM

## 2021-10-29 DIAGNOSIS — E78.5 DYSLIPIDEMIA: Primary | ICD-10-CM

## 2021-10-29 PROCEDURE — 90471 IMMUNIZATION ADMIN: CPT | Mod: S$GLB,,, | Performed by: FAMILY MEDICINE

## 2021-10-29 PROCEDURE — 3079F PR MOST RECENT DIASTOLIC BLOOD PRESSURE 80-89 MM HG: ICD-10-PCS | Mod: CPTII,S$GLB,, | Performed by: FAMILY MEDICINE

## 2021-10-29 PROCEDURE — 99214 OFFICE O/P EST MOD 30 MIN: CPT | Mod: 25,S$GLB,, | Performed by: FAMILY MEDICINE

## 2021-10-29 PROCEDURE — 99214 PR OFFICE/OUTPT VISIT, EST, LEVL IV, 30-39 MIN: ICD-10-PCS | Mod: 25,S$GLB,, | Performed by: FAMILY MEDICINE

## 2021-10-29 PROCEDURE — 1159F PR MEDICATION LIST DOCUMENTED IN MEDICAL RECORD: ICD-10-PCS | Mod: CPTII,S$GLB,, | Performed by: FAMILY MEDICINE

## 2021-10-29 PROCEDURE — 90471 FLU VACCINE (QUAD) GREATER THAN OR EQUAL TO 3YO PRESERVATIVE FREE IM: ICD-10-PCS | Mod: S$GLB,,, | Performed by: FAMILY MEDICINE

## 2021-10-29 PROCEDURE — 3079F DIAST BP 80-89 MM HG: CPT | Mod: CPTII,S$GLB,, | Performed by: FAMILY MEDICINE

## 2021-10-29 PROCEDURE — 1159F MED LIST DOCD IN RCRD: CPT | Mod: CPTII,S$GLB,, | Performed by: FAMILY MEDICINE

## 2021-10-29 PROCEDURE — 3074F SYST BP LT 130 MM HG: CPT | Mod: CPTII,S$GLB,, | Performed by: FAMILY MEDICINE

## 2021-10-29 PROCEDURE — 90686 FLU VACCINE (QUAD) GREATER THAN OR EQUAL TO 3YO PRESERVATIVE FREE IM: ICD-10-PCS | Mod: S$GLB,,, | Performed by: FAMILY MEDICINE

## 2021-10-29 PROCEDURE — 3008F PR BODY MASS INDEX (BMI) DOCUMENTED: ICD-10-PCS | Mod: CPTII,S$GLB,, | Performed by: FAMILY MEDICINE

## 2021-10-29 PROCEDURE — 99999 PR PBB SHADOW E&M-EST. PATIENT-LVL III: ICD-10-PCS | Mod: PBBFAC,,, | Performed by: FAMILY MEDICINE

## 2021-10-29 PROCEDURE — 99999 PR PBB SHADOW E&M-EST. PATIENT-LVL III: CPT | Mod: PBBFAC,,, | Performed by: FAMILY MEDICINE

## 2021-10-29 PROCEDURE — 3008F BODY MASS INDEX DOCD: CPT | Mod: CPTII,S$GLB,, | Performed by: FAMILY MEDICINE

## 2021-10-29 PROCEDURE — 3074F PR MOST RECENT SYSTOLIC BLOOD PRESSURE < 130 MM HG: ICD-10-PCS | Mod: CPTII,S$GLB,, | Performed by: FAMILY MEDICINE

## 2021-10-29 PROCEDURE — 90686 IIV4 VACC NO PRSV 0.5 ML IM: CPT | Mod: S$GLB,,, | Performed by: FAMILY MEDICINE

## 2021-10-29 RX ORDER — DOXEPIN HYDROCHLORIDE 100 MG/1
100 CAPSULE ORAL NIGHTLY
Qty: 90 CAPSULE | Refills: 3 | Status: SHIPPED | OUTPATIENT
Start: 2021-10-29 | End: 2022-10-26

## 2021-10-29 RX ORDER — ATORVASTATIN CALCIUM 20 MG/1
20 TABLET, FILM COATED ORAL NIGHTLY
Qty: 90 TABLET | Refills: 3 | Status: SHIPPED | OUTPATIENT
Start: 2021-10-29 | End: 2022-05-10 | Stop reason: SDUPTHER

## 2022-02-23 ENCOUNTER — OFFICE VISIT (OUTPATIENT)
Dept: FAMILY MEDICINE | Facility: CLINIC | Age: 52
End: 2022-02-23
Payer: COMMERCIAL

## 2022-02-23 ENCOUNTER — LAB VISIT (OUTPATIENT)
Dept: LAB | Facility: HOSPITAL | Age: 52
End: 2022-02-23
Attending: FAMILY MEDICINE
Payer: COMMERCIAL

## 2022-02-23 VITALS
DIASTOLIC BLOOD PRESSURE: 80 MMHG | HEART RATE: 70 BPM | BODY MASS INDEX: 26.5 KG/M2 | SYSTOLIC BLOOD PRESSURE: 120 MMHG | WEIGHT: 164.88 LBS | HEIGHT: 66 IN | OXYGEN SATURATION: 98 %

## 2022-02-23 DIAGNOSIS — E78.2 MIXED HYPERLIPIDEMIA: ICD-10-CM

## 2022-02-23 DIAGNOSIS — G89.29 CHRONIC PAIN OF LEFT KNEE: ICD-10-CM

## 2022-02-23 DIAGNOSIS — M25.562 CHRONIC PAIN OF LEFT KNEE: ICD-10-CM

## 2022-02-23 DIAGNOSIS — G44.219 EPISODIC TENSION-TYPE HEADACHE, NOT INTRACTABLE: Primary | ICD-10-CM

## 2022-02-23 DIAGNOSIS — Z12.11 SCREEN FOR COLON CANCER: ICD-10-CM

## 2022-02-23 DIAGNOSIS — Z23 NEED FOR VACCINATION AGAINST STREPTOCOCCUS PNEUMONIAE: ICD-10-CM

## 2022-02-23 LAB
ALBUMIN SERPL BCP-MCNC: 3.8 G/DL (ref 3.5–5.2)
ALP SERPL-CCNC: 69 U/L (ref 55–135)
ALT SERPL W/O P-5'-P-CCNC: 35 U/L (ref 10–44)
ANION GAP SERPL CALC-SCNC: 8 MMOL/L (ref 8–16)
AST SERPL-CCNC: 25 U/L (ref 10–40)
BASOPHILS # BLD AUTO: 0.03 K/UL (ref 0–0.2)
BASOPHILS NFR BLD: 0.5 % (ref 0–1.9)
BILIRUB SERPL-MCNC: 0.6 MG/DL (ref 0.1–1)
BUN SERPL-MCNC: 17 MG/DL (ref 6–20)
CALCIUM SERPL-MCNC: 9.5 MG/DL (ref 8.7–10.5)
CHLORIDE SERPL-SCNC: 104 MMOL/L (ref 95–110)
CHOLEST SERPL-MCNC: 213 MG/DL (ref 120–199)
CHOLEST/HDLC SERPL: 6.1 {RATIO} (ref 2–5)
CO2 SERPL-SCNC: 28 MMOL/L (ref 23–29)
CREAT SERPL-MCNC: 1 MG/DL (ref 0.5–1.4)
DIFFERENTIAL METHOD: ABNORMAL
EOSINOPHIL # BLD AUTO: 0.4 K/UL (ref 0–0.5)
EOSINOPHIL NFR BLD: 6.2 % (ref 0–8)
ERYTHROCYTE [DISTWIDTH] IN BLOOD BY AUTOMATED COUNT: 14 % (ref 11.5–14.5)
EST. GFR  (AFRICAN AMERICAN): >60 ML/MIN/1.73 M^2
EST. GFR  (NON AFRICAN AMERICAN): >60 ML/MIN/1.73 M^2
GLUCOSE SERPL-MCNC: 100 MG/DL (ref 70–110)
HCT VFR BLD AUTO: 47.7 % (ref 40–54)
HDLC SERPL-MCNC: 35 MG/DL (ref 40–75)
HDLC SERPL: 16.4 % (ref 20–50)
HGB BLD-MCNC: 14.9 G/DL (ref 14–18)
IMM GRANULOCYTES # BLD AUTO: 0.04 K/UL (ref 0–0.04)
IMM GRANULOCYTES NFR BLD AUTO: 0.7 % (ref 0–0.5)
LDLC SERPL CALC-MCNC: 151 MG/DL (ref 63–159)
LYMPHOCYTES # BLD AUTO: 2 K/UL (ref 1–4.8)
LYMPHOCYTES NFR BLD: 32.9 % (ref 18–48)
MCH RBC QN AUTO: 28.7 PG (ref 27–31)
MCHC RBC AUTO-ENTMCNC: 31.2 G/DL (ref 32–36)
MCV RBC AUTO: 92 FL (ref 82–98)
MONOCYTES # BLD AUTO: 0.6 K/UL (ref 0.3–1)
MONOCYTES NFR BLD: 9.4 % (ref 4–15)
NEUTROPHILS # BLD AUTO: 3 K/UL (ref 1.8–7.7)
NEUTROPHILS NFR BLD: 50.3 % (ref 38–73)
NONHDLC SERPL-MCNC: 178 MG/DL
NRBC BLD-RTO: 0 /100 WBC
PLATELET # BLD AUTO: 279 K/UL (ref 150–450)
PMV BLD AUTO: 10.2 FL (ref 9.2–12.9)
POTASSIUM SERPL-SCNC: 4 MMOL/L (ref 3.5–5.1)
PROT SERPL-MCNC: 6.9 G/DL (ref 6–8.4)
RBC # BLD AUTO: 5.19 M/UL (ref 4.6–6.2)
SODIUM SERPL-SCNC: 140 MMOL/L (ref 136–145)
TRIGL SERPL-MCNC: 135 MG/DL (ref 30–150)
TSH SERPL DL<=0.005 MIU/L-ACNC: 3.51 UIU/ML (ref 0.4–4)
WBC # BLD AUTO: 5.93 K/UL (ref 3.9–12.7)

## 2022-02-23 PROCEDURE — 85025 COMPLETE CBC W/AUTO DIFF WBC: CPT | Performed by: FAMILY MEDICINE

## 2022-02-23 PROCEDURE — 80053 COMPREHEN METABOLIC PANEL: CPT | Performed by: FAMILY MEDICINE

## 2022-02-23 PROCEDURE — 90471 PNEUMOCOCCAL POLYSACCHARIDE VACCINE 23-VALENT =>2YO SQ IM: ICD-10-PCS | Mod: S$GLB,,, | Performed by: FAMILY MEDICINE

## 2022-02-23 PROCEDURE — 1159F MED LIST DOCD IN RCRD: CPT | Mod: CPTII,S$GLB,, | Performed by: FAMILY MEDICINE

## 2022-02-23 PROCEDURE — 90732 PNEUMOCOCCAL POLYSACCHARIDE VACCINE 23-VALENT =>2YO SQ IM: ICD-10-PCS | Mod: S$GLB,,, | Performed by: FAMILY MEDICINE

## 2022-02-23 PROCEDURE — 1159F PR MEDICATION LIST DOCUMENTED IN MEDICAL RECORD: ICD-10-PCS | Mod: CPTII,S$GLB,, | Performed by: FAMILY MEDICINE

## 2022-02-23 PROCEDURE — 84443 ASSAY THYROID STIM HORMONE: CPT | Performed by: FAMILY MEDICINE

## 2022-02-23 PROCEDURE — 80061 LIPID PANEL: CPT | Performed by: FAMILY MEDICINE

## 2022-02-23 PROCEDURE — 3079F PR MOST RECENT DIASTOLIC BLOOD PRESSURE 80-89 MM HG: ICD-10-PCS | Mod: CPTII,S$GLB,, | Performed by: FAMILY MEDICINE

## 2022-02-23 PROCEDURE — 3074F PR MOST RECENT SYSTOLIC BLOOD PRESSURE < 130 MM HG: ICD-10-PCS | Mod: CPTII,S$GLB,, | Performed by: FAMILY MEDICINE

## 2022-02-23 PROCEDURE — 90471 IMMUNIZATION ADMIN: CPT | Mod: S$GLB,,, | Performed by: FAMILY MEDICINE

## 2022-02-23 PROCEDURE — 90732 PPSV23 VACC 2 YRS+ SUBQ/IM: CPT | Mod: S$GLB,,, | Performed by: FAMILY MEDICINE

## 2022-02-23 PROCEDURE — 3074F SYST BP LT 130 MM HG: CPT | Mod: CPTII,S$GLB,, | Performed by: FAMILY MEDICINE

## 2022-02-23 PROCEDURE — 1160F PR REVIEW ALL MEDS BY PRESCRIBER/CLIN PHARMACIST DOCUMENTED: ICD-10-PCS | Mod: CPTII,S$GLB,, | Performed by: FAMILY MEDICINE

## 2022-02-23 PROCEDURE — 99214 OFFICE O/P EST MOD 30 MIN: CPT | Mod: 25,S$GLB,, | Performed by: FAMILY MEDICINE

## 2022-02-23 PROCEDURE — 99214 PR OFFICE/OUTPT VISIT, EST, LEVL IV, 30-39 MIN: ICD-10-PCS | Mod: 25,S$GLB,, | Performed by: FAMILY MEDICINE

## 2022-02-23 PROCEDURE — 3008F PR BODY MASS INDEX (BMI) DOCUMENTED: ICD-10-PCS | Mod: CPTII,S$GLB,, | Performed by: FAMILY MEDICINE

## 2022-02-23 PROCEDURE — 3079F DIAST BP 80-89 MM HG: CPT | Mod: CPTII,S$GLB,, | Performed by: FAMILY MEDICINE

## 2022-02-23 PROCEDURE — 3008F BODY MASS INDEX DOCD: CPT | Mod: CPTII,S$GLB,, | Performed by: FAMILY MEDICINE

## 2022-02-23 PROCEDURE — 1160F RVW MEDS BY RX/DR IN RCRD: CPT | Mod: CPTII,S$GLB,, | Performed by: FAMILY MEDICINE

## 2022-02-23 PROCEDURE — 99999 PR PBB SHADOW E&M-EST. PATIENT-LVL IV: ICD-10-PCS | Mod: PBBFAC,,, | Performed by: FAMILY MEDICINE

## 2022-02-23 PROCEDURE — 99999 PR PBB SHADOW E&M-EST. PATIENT-LVL IV: CPT | Mod: PBBFAC,,, | Performed by: FAMILY MEDICINE

## 2022-02-23 PROCEDURE — 36415 COLL VENOUS BLD VENIPUNCTURE: CPT | Mod: PO | Performed by: FAMILY MEDICINE

## 2022-02-24 ENCOUNTER — TELEPHONE (OUTPATIENT)
Dept: ADMINISTRATIVE | Facility: OTHER | Age: 52
End: 2022-02-24
Payer: COMMERCIAL

## 2022-02-28 ENCOUNTER — TELEPHONE (OUTPATIENT)
Dept: ADMINISTRATIVE | Facility: OTHER | Age: 52
End: 2022-02-28
Payer: COMMERCIAL

## 2022-05-10 ENCOUNTER — OFFICE VISIT (OUTPATIENT)
Dept: FAMILY MEDICINE | Facility: CLINIC | Age: 52
End: 2022-05-10
Payer: COMMERCIAL

## 2022-05-10 VITALS
DIASTOLIC BLOOD PRESSURE: 72 MMHG | SYSTOLIC BLOOD PRESSURE: 128 MMHG | OXYGEN SATURATION: 98 % | WEIGHT: 171.31 LBS | HEART RATE: 82 BPM | HEIGHT: 66 IN | BODY MASS INDEX: 27.53 KG/M2

## 2022-05-10 DIAGNOSIS — K52.9 CHRONIC DIARRHEA: Primary | ICD-10-CM

## 2022-05-10 DIAGNOSIS — E78.5 DYSLIPIDEMIA: ICD-10-CM

## 2022-05-10 PROCEDURE — 3074F PR MOST RECENT SYSTOLIC BLOOD PRESSURE < 130 MM HG: ICD-10-PCS | Mod: CPTII,S$GLB,, | Performed by: FAMILY MEDICINE

## 2022-05-10 PROCEDURE — 99999 PR PBB SHADOW E&M-EST. PATIENT-LVL III: CPT | Mod: PBBFAC,,, | Performed by: FAMILY MEDICINE

## 2022-05-10 PROCEDURE — 3078F DIAST BP <80 MM HG: CPT | Mod: CPTII,S$GLB,, | Performed by: FAMILY MEDICINE

## 2022-05-10 PROCEDURE — 3074F SYST BP LT 130 MM HG: CPT | Mod: CPTII,S$GLB,, | Performed by: FAMILY MEDICINE

## 2022-05-10 PROCEDURE — 3008F BODY MASS INDEX DOCD: CPT | Mod: CPTII,S$GLB,, | Performed by: FAMILY MEDICINE

## 2022-05-10 PROCEDURE — 3078F PR MOST RECENT DIASTOLIC BLOOD PRESSURE < 80 MM HG: ICD-10-PCS | Mod: CPTII,S$GLB,, | Performed by: FAMILY MEDICINE

## 2022-05-10 PROCEDURE — 3008F PR BODY MASS INDEX (BMI) DOCUMENTED: ICD-10-PCS | Mod: CPTII,S$GLB,, | Performed by: FAMILY MEDICINE

## 2022-05-10 PROCEDURE — 99999 PR PBB SHADOW E&M-EST. PATIENT-LVL III: ICD-10-PCS | Mod: PBBFAC,,, | Performed by: FAMILY MEDICINE

## 2022-05-10 PROCEDURE — 1159F PR MEDICATION LIST DOCUMENTED IN MEDICAL RECORD: ICD-10-PCS | Mod: CPTII,S$GLB,, | Performed by: FAMILY MEDICINE

## 2022-05-10 PROCEDURE — 99214 OFFICE O/P EST MOD 30 MIN: CPT | Mod: S$GLB,,, | Performed by: FAMILY MEDICINE

## 2022-05-10 PROCEDURE — 99214 PR OFFICE/OUTPT VISIT, EST, LEVL IV, 30-39 MIN: ICD-10-PCS | Mod: S$GLB,,, | Performed by: FAMILY MEDICINE

## 2022-05-10 PROCEDURE — 1159F MED LIST DOCD IN RCRD: CPT | Mod: CPTII,S$GLB,, | Performed by: FAMILY MEDICINE

## 2022-05-10 RX ORDER — ATORVASTATIN CALCIUM 40 MG/1
40 TABLET, FILM COATED ORAL NIGHTLY
Qty: 90 TABLET | Refills: 3 | Status: SHIPPED | OUTPATIENT
Start: 2022-05-10 | End: 2023-05-10

## 2022-05-10 NOTE — PROGRESS NOTES
Subjective:       Patient ID: Monster Crespo is a 52 y.o. male.    Chief Complaint: Follow-up and Dyslipidemia    52 years old male came to the clinic with chronic diarrhea for many years.  He reports improvement after stopping milk products.  Last cholesterol was elevated.  No chest pain, palpitation, orthopnea or PND.    Review of Systems   Constitutional: Negative.    HENT: Negative.    Eyes: Negative.    Respiratory: Negative.    Cardiovascular: Negative.  Negative for chest pain, palpitations, leg swelling and claudication.   Gastrointestinal: Negative.    Genitourinary: Negative.    Musculoskeletal: Negative.    Integumentary:  Negative.   Neurological: Negative.    Psychiatric/Behavioral: Negative.          Objective:      Physical Exam  Vitals and nursing note reviewed.   Constitutional:       General: He is not in acute distress.     Appearance: He is well-developed. He is not diaphoretic.   HENT:      Head: Normocephalic and atraumatic.      Right Ear: External ear normal.      Left Ear: External ear normal.      Nose: Nose normal.      Mouth/Throat:      Pharynx: No oropharyngeal exudate.   Eyes:      General: No scleral icterus.        Right eye: No discharge.         Left eye: No discharge.      Conjunctiva/sclera: Conjunctivae normal.      Pupils: Pupils are equal, round, and reactive to light.   Neck:      Thyroid: No thyromegaly.      Vascular: No JVD.      Trachea: No tracheal deviation.   Cardiovascular:      Rate and Rhythm: Normal rate and regular rhythm.      Heart sounds: Normal heart sounds. No murmur heard.    No friction rub. No gallop.   Pulmonary:      Effort: Pulmonary effort is normal. No respiratory distress.      Breath sounds: Normal breath sounds. No stridor. No wheezing or rales.   Chest:      Chest wall: No tenderness.   Abdominal:      General: Bowel sounds are normal. There is no distension.      Palpations: Abdomen is soft. There is no mass.      Tenderness: There is no  abdominal tenderness. There is no guarding or rebound.   Musculoskeletal:         General: No tenderness. Normal range of motion.      Cervical back: Normal range of motion and neck supple.   Lymphadenopathy:      Cervical: No cervical adenopathy.   Skin:     General: Skin is warm and dry.      Coloration: Skin is not pale.      Findings: No erythema or rash.   Neurological:      Mental Status: He is alert and oriented to person, place, and time.      Cranial Nerves: No cranial nerve deficit.      Motor: No abnormal muscle tone.      Coordination: Coordination normal.      Deep Tendon Reflexes: Reflexes are normal and symmetric. Reflexes normal.   Psychiatric:         Behavior: Behavior normal.         Thought Content: Thought content normal.         Judgment: Judgment normal.         Assessment:       Problem List Items Addressed This Visit     Dyslipidemia    Relevant Medications    atorvastatin (LIPITOR) 40 MG tablet      Other Visit Diagnoses     Chronic diarrhea    -  Primary    Relevant Orders    Tissue transglutaminase, IgA    IgA    Lactose Tolerance Breath Test          Plan:         Monster was seen today for follow-up and dyslipidemia.    Diagnoses and all orders for this visit:    Chronic diarrhea  -     Tissue transglutaminase, IgA; Future  -     IgA; Future  -     Lactose Tolerance Breath Test; Future    Dyslipidemia  -     atorvastatin (LIPITOR) 40 MG tablet; Take 1 tablet (40 mg total) by mouth every evening.

## 2022-08-03 ENCOUNTER — LAB VISIT (OUTPATIENT)
Dept: LAB | Facility: HOSPITAL | Age: 52
End: 2022-08-03
Attending: FAMILY MEDICINE
Payer: COMMERCIAL

## 2022-08-03 ENCOUNTER — OFFICE VISIT (OUTPATIENT)
Dept: FAMILY MEDICINE | Facility: CLINIC | Age: 52
End: 2022-08-03
Payer: COMMERCIAL

## 2022-08-03 VITALS
HEART RATE: 73 BPM | DIASTOLIC BLOOD PRESSURE: 82 MMHG | OXYGEN SATURATION: 96 % | SYSTOLIC BLOOD PRESSURE: 122 MMHG | BODY MASS INDEX: 26.82 KG/M2 | HEIGHT: 66 IN | WEIGHT: 166.88 LBS

## 2022-08-03 DIAGNOSIS — F33.1 MODERATE EPISODE OF RECURRENT MAJOR DEPRESSIVE DISORDER: ICD-10-CM

## 2022-08-03 DIAGNOSIS — Z12.11 SCREEN FOR COLON CANCER: ICD-10-CM

## 2022-08-03 DIAGNOSIS — E78.5 DYSLIPIDEMIA: ICD-10-CM

## 2022-08-03 DIAGNOSIS — E78.5 DYSLIPIDEMIA: Primary | ICD-10-CM

## 2022-08-03 LAB
ALBUMIN SERPL BCP-MCNC: 3.8 G/DL (ref 3.5–5.2)
ALP SERPL-CCNC: 72 U/L (ref 55–135)
ALT SERPL W/O P-5'-P-CCNC: 39 U/L (ref 10–44)
ANION GAP SERPL CALC-SCNC: 10 MMOL/L (ref 8–16)
AST SERPL-CCNC: 26 U/L (ref 10–40)
BASOPHILS # BLD AUTO: 0.04 K/UL (ref 0–0.2)
BASOPHILS NFR BLD: 0.7 % (ref 0–1.9)
BILIRUB SERPL-MCNC: 0.7 MG/DL (ref 0.1–1)
BUN SERPL-MCNC: 22 MG/DL (ref 6–20)
CALCIUM SERPL-MCNC: 9.1 MG/DL (ref 8.7–10.5)
CHLORIDE SERPL-SCNC: 105 MMOL/L (ref 95–110)
CHOLEST SERPL-MCNC: 130 MG/DL (ref 120–199)
CHOLEST/HDLC SERPL: 3.6 {RATIO} (ref 2–5)
CO2 SERPL-SCNC: 26 MMOL/L (ref 23–29)
CREAT SERPL-MCNC: 1 MG/DL (ref 0.5–1.4)
DIFFERENTIAL METHOD: ABNORMAL
EOSINOPHIL # BLD AUTO: 0.4 K/UL (ref 0–0.5)
EOSINOPHIL NFR BLD: 6.5 % (ref 0–8)
ERYTHROCYTE [DISTWIDTH] IN BLOOD BY AUTOMATED COUNT: 14.6 % (ref 11.5–14.5)
EST. GFR  (NO RACE VARIABLE): >60 ML/MIN/1.73 M^2
GLUCOSE SERPL-MCNC: 102 MG/DL (ref 70–110)
HCT VFR BLD AUTO: 46.3 % (ref 40–54)
HDLC SERPL-MCNC: 36 MG/DL (ref 40–75)
HDLC SERPL: 27.7 % (ref 20–50)
HGB BLD-MCNC: 14.7 G/DL (ref 14–18)
IMM GRANULOCYTES # BLD AUTO: 0.07 K/UL (ref 0–0.04)
IMM GRANULOCYTES NFR BLD AUTO: 1.2 % (ref 0–0.5)
LDLC SERPL CALC-MCNC: 75.2 MG/DL (ref 63–159)
LYMPHOCYTES # BLD AUTO: 1.3 K/UL (ref 1–4.8)
LYMPHOCYTES NFR BLD: 22.2 % (ref 18–48)
MCH RBC QN AUTO: 28.1 PG (ref 27–31)
MCHC RBC AUTO-ENTMCNC: 31.7 G/DL (ref 32–36)
MCV RBC AUTO: 88 FL (ref 82–98)
MONOCYTES # BLD AUTO: 0.5 K/UL (ref 0.3–1)
MONOCYTES NFR BLD: 8.6 % (ref 4–15)
NEUTROPHILS # BLD AUTO: 3.5 K/UL (ref 1.8–7.7)
NEUTROPHILS NFR BLD: 60.8 % (ref 38–73)
NONHDLC SERPL-MCNC: 94 MG/DL
NRBC BLD-RTO: 0 /100 WBC
PLATELET # BLD AUTO: 309 K/UL (ref 150–450)
PMV BLD AUTO: 10.1 FL (ref 9.2–12.9)
POTASSIUM SERPL-SCNC: 4 MMOL/L (ref 3.5–5.1)
PROT SERPL-MCNC: 6.9 G/DL (ref 6–8.4)
RBC # BLD AUTO: 5.24 M/UL (ref 4.6–6.2)
SODIUM SERPL-SCNC: 141 MMOL/L (ref 136–145)
TRIGL SERPL-MCNC: 94 MG/DL (ref 30–150)
TSH SERPL DL<=0.005 MIU/L-ACNC: 2.73 UIU/ML (ref 0.4–4)
WBC # BLD AUTO: 5.68 K/UL (ref 3.9–12.7)

## 2022-08-03 PROCEDURE — 3074F PR MOST RECENT SYSTOLIC BLOOD PRESSURE < 130 MM HG: ICD-10-PCS | Mod: CPTII,S$GLB,, | Performed by: FAMILY MEDICINE

## 2022-08-03 PROCEDURE — 99214 OFFICE O/P EST MOD 30 MIN: CPT | Mod: S$GLB,,, | Performed by: FAMILY MEDICINE

## 2022-08-03 PROCEDURE — 3074F SYST BP LT 130 MM HG: CPT | Mod: CPTII,S$GLB,, | Performed by: FAMILY MEDICINE

## 2022-08-03 PROCEDURE — 1160F RVW MEDS BY RX/DR IN RCRD: CPT | Mod: CPTII,S$GLB,, | Performed by: FAMILY MEDICINE

## 2022-08-03 PROCEDURE — 85025 COMPLETE CBC W/AUTO DIFF WBC: CPT | Performed by: FAMILY MEDICINE

## 2022-08-03 PROCEDURE — 3008F PR BODY MASS INDEX (BMI) DOCUMENTED: ICD-10-PCS | Mod: CPTII,S$GLB,, | Performed by: FAMILY MEDICINE

## 2022-08-03 PROCEDURE — 3079F PR MOST RECENT DIASTOLIC BLOOD PRESSURE 80-89 MM HG: ICD-10-PCS | Mod: CPTII,S$GLB,, | Performed by: FAMILY MEDICINE

## 2022-08-03 PROCEDURE — 99214 PR OFFICE/OUTPT VISIT, EST, LEVL IV, 30-39 MIN: ICD-10-PCS | Mod: S$GLB,,, | Performed by: FAMILY MEDICINE

## 2022-08-03 PROCEDURE — 1159F PR MEDICATION LIST DOCUMENTED IN MEDICAL RECORD: ICD-10-PCS | Mod: CPTII,S$GLB,, | Performed by: FAMILY MEDICINE

## 2022-08-03 PROCEDURE — 99999 PR PBB SHADOW E&M-EST. PATIENT-LVL III: CPT | Mod: PBBFAC,,, | Performed by: FAMILY MEDICINE

## 2022-08-03 PROCEDURE — 36415 COLL VENOUS BLD VENIPUNCTURE: CPT | Mod: PO | Performed by: FAMILY MEDICINE

## 2022-08-03 PROCEDURE — 1160F PR REVIEW ALL MEDS BY PRESCRIBER/CLIN PHARMACIST DOCUMENTED: ICD-10-PCS | Mod: CPTII,S$GLB,, | Performed by: FAMILY MEDICINE

## 2022-08-03 PROCEDURE — 80061 LIPID PANEL: CPT | Performed by: FAMILY MEDICINE

## 2022-08-03 PROCEDURE — 84443 ASSAY THYROID STIM HORMONE: CPT | Performed by: FAMILY MEDICINE

## 2022-08-03 PROCEDURE — 1159F MED LIST DOCD IN RCRD: CPT | Mod: CPTII,S$GLB,, | Performed by: FAMILY MEDICINE

## 2022-08-03 PROCEDURE — 3008F BODY MASS INDEX DOCD: CPT | Mod: CPTII,S$GLB,, | Performed by: FAMILY MEDICINE

## 2022-08-03 PROCEDURE — 3079F DIAST BP 80-89 MM HG: CPT | Mod: CPTII,S$GLB,, | Performed by: FAMILY MEDICINE

## 2022-08-03 PROCEDURE — 99999 PR PBB SHADOW E&M-EST. PATIENT-LVL III: ICD-10-PCS | Mod: PBBFAC,,, | Performed by: FAMILY MEDICINE

## 2022-08-03 PROCEDURE — 80053 COMPREHEN METABOLIC PANEL: CPT | Performed by: FAMILY MEDICINE

## 2022-08-03 RX ORDER — ESCITALOPRAM OXALATE 5 MG/1
5 TABLET ORAL DAILY
Qty: 90 TABLET | Refills: 3 | Status: SHIPPED | OUTPATIENT
Start: 2022-08-03 | End: 2023-02-03 | Stop reason: SDUPTHER

## 2022-08-03 NOTE — PROGRESS NOTES
Subjective:       Patient ID: Monster Crespo is a 52 y.o. male.    Chief Complaint: Follow-up    52 years old male came to the clinic for cholesterol follow-up.  Patient is sometimes taking the cholesterol medicine.  He is requesting a medicine to help with the depression.  No suicidal or homicidal ideations.    Review of Systems   Constitutional: Negative.    HENT: Negative.    Eyes: Negative.    Respiratory: Negative.    Cardiovascular: Negative.  Negative for chest pain, palpitations, leg swelling and claudication.   Gastrointestinal: Negative.    Genitourinary: Negative.    Musculoskeletal: Negative.    Integumentary:  Negative.   Neurological: Negative.    Psychiatric/Behavioral: Positive for dysphoric mood and sleep disturbance.         Objective:      Physical Exam  Vitals and nursing note reviewed.   Constitutional:       General: He is not in acute distress.     Appearance: He is well-developed. He is not diaphoretic.   HENT:      Head: Normocephalic and atraumatic.      Right Ear: External ear normal.      Left Ear: External ear normal.      Nose: Nose normal.      Mouth/Throat:      Pharynx: No oropharyngeal exudate.   Eyes:      General: No scleral icterus.        Right eye: No discharge.         Left eye: No discharge.      Conjunctiva/sclera: Conjunctivae normal.      Pupils: Pupils are equal, round, and reactive to light.   Neck:      Thyroid: No thyromegaly.      Vascular: No JVD.      Trachea: No tracheal deviation.   Cardiovascular:      Rate and Rhythm: Normal rate and regular rhythm.      Heart sounds: Normal heart sounds. No murmur heard.    No friction rub. No gallop.   Pulmonary:      Effort: Pulmonary effort is normal. No respiratory distress.      Breath sounds: Normal breath sounds. No stridor. No wheezing or rales.   Chest:      Chest wall: No tenderness.   Abdominal:      General: Bowel sounds are normal. There is no distension.      Palpations: Abdomen is soft. There is no mass.       Tenderness: There is no abdominal tenderness. There is no guarding or rebound.   Musculoskeletal:         General: No tenderness. Normal range of motion.      Cervical back: Normal range of motion and neck supple.   Lymphadenopathy:      Cervical: No cervical adenopathy.   Skin:     General: Skin is warm and dry.      Coloration: Skin is not pale.      Findings: No erythema or rash.   Neurological:      Mental Status: He is alert and oriented to person, place, and time.      Cranial Nerves: No cranial nerve deficit.      Motor: No abnormal muscle tone.      Coordination: Coordination normal.      Deep Tendon Reflexes: Reflexes are normal and symmetric. Reflexes normal.   Psychiatric:         Mood and Affect: Mood is depressed.         Behavior: Behavior normal.         Thought Content: Thought content normal.         Judgment: Judgment normal.         Assessment:       Problem List Items Addressed This Visit     Dyslipidemia - Primary    Relevant Orders    CBC Auto Differential    Comprehensive Metabolic Panel    Lipid Panel    Urinalysis    TSH      Other Visit Diagnoses     Screen for colon cancer        Relevant Orders    Case Request Endoscopy: COLONOSCOPY (Completed)    Moderate episode of recurrent major depressive disorder        Relevant Medications    EScitalopram oxalate (LEXAPRO) 5 MG Tab          Plan:         Monster was seen today for follow-up.    Diagnoses and all orders for this visit:    Dyslipidemia  -     CBC Auto Differential; Future  -     Comprehensive Metabolic Panel; Future  -     Lipid Panel; Future  -     Urinalysis; Future  -     TSH; Future    Screen for colon cancer  -     Case Request Endoscopy: COLONOSCOPY    Moderate episode of recurrent major depressive disorder  -     EScitalopram oxalate (LEXAPRO) 5 MG Tab; Take 1 tablet (5 mg total) by mouth once daily.

## 2022-11-11 ENCOUNTER — TELEPHONE (OUTPATIENT)
Dept: GASTROENTEROLOGY | Facility: CLINIC | Age: 52
End: 2022-11-11
Payer: COMMERCIAL

## 2023-01-20 ENCOUNTER — PATIENT OUTREACH (OUTPATIENT)
Dept: ADMINISTRATIVE | Facility: HOSPITAL | Age: 53
End: 2023-01-20
Payer: COMMERCIAL

## 2023-01-20 ENCOUNTER — PATIENT MESSAGE (OUTPATIENT)
Dept: ADMINISTRATIVE | Facility: HOSPITAL | Age: 53
End: 2023-01-20
Payer: COMMERCIAL

## 2023-01-20 NOTE — PROGRESS NOTES
2023 Care Everywhere updates requested and reviewed.  Immunizations reconciled. Media reports reviewed.  Duplicate HM overrides and  orders removed.  Overdue HM topic chart audit and/or requested.  Overdue lab testing linked to upcoming lab appointments if applies.  Lab racquel, and [facility] reviewed   Portal outreached regarding Health maintenance     Health Maintenance Due   Topic Date Due    Colorectal Cancer Screening  Never done    Shingles Vaccine (1 of 2) Never done    COVID-19 Vaccine (4 - Booster for Pfizer series) 2022    Influenza Vaccine (1) 2022

## 2023-02-03 ENCOUNTER — OFFICE VISIT (OUTPATIENT)
Dept: FAMILY MEDICINE | Facility: CLINIC | Age: 53
End: 2023-02-03
Payer: COMMERCIAL

## 2023-02-03 ENCOUNTER — LAB VISIT (OUTPATIENT)
Dept: LAB | Facility: HOSPITAL | Age: 53
End: 2023-02-03
Attending: FAMILY MEDICINE
Payer: COMMERCIAL

## 2023-02-03 VITALS
BODY MASS INDEX: 27.67 KG/M2 | WEIGHT: 172.19 LBS | OXYGEN SATURATION: 98 % | SYSTOLIC BLOOD PRESSURE: 122 MMHG | HEIGHT: 66 IN | DIASTOLIC BLOOD PRESSURE: 80 MMHG | HEART RATE: 67 BPM

## 2023-02-03 DIAGNOSIS — E78.5 DYSLIPIDEMIA: Primary | ICD-10-CM

## 2023-02-03 DIAGNOSIS — F33.1 MODERATE EPISODE OF RECURRENT MAJOR DEPRESSIVE DISORDER: ICD-10-CM

## 2023-02-03 DIAGNOSIS — H93.13 TINNITUS AURIUM, BILATERAL: ICD-10-CM

## 2023-02-03 DIAGNOSIS — Z12.11 SCREEN FOR COLON CANCER: ICD-10-CM

## 2023-02-03 DIAGNOSIS — E78.5 DYSLIPIDEMIA: ICD-10-CM

## 2023-02-03 LAB
ALBUMIN SERPL BCP-MCNC: 3.9 G/DL (ref 3.5–5.2)
ALP SERPL-CCNC: 77 U/L (ref 55–135)
ALT SERPL W/O P-5'-P-CCNC: 38 U/L (ref 10–44)
ANION GAP SERPL CALC-SCNC: 9 MMOL/L (ref 8–16)
AST SERPL-CCNC: 28 U/L (ref 10–40)
BILIRUB SERPL-MCNC: 0.4 MG/DL (ref 0.1–1)
BUN SERPL-MCNC: 19 MG/DL (ref 6–20)
CALCIUM SERPL-MCNC: 8.9 MG/DL (ref 8.7–10.5)
CHLORIDE SERPL-SCNC: 106 MMOL/L (ref 95–110)
CHOLEST SERPL-MCNC: 191 MG/DL (ref 120–199)
CHOLEST/HDLC SERPL: 4.5 {RATIO} (ref 2–5)
CO2 SERPL-SCNC: 25 MMOL/L (ref 23–29)
CREAT SERPL-MCNC: 1 MG/DL (ref 0.5–1.4)
EST. GFR  (NO RACE VARIABLE): >60 ML/MIN/1.73 M^2
GLUCOSE SERPL-MCNC: 105 MG/DL (ref 70–110)
HDLC SERPL-MCNC: 42 MG/DL (ref 40–75)
HDLC SERPL: 22 % (ref 20–50)
LDLC SERPL CALC-MCNC: 131 MG/DL (ref 63–159)
NONHDLC SERPL-MCNC: 149 MG/DL
POTASSIUM SERPL-SCNC: 4.1 MMOL/L (ref 3.5–5.1)
PROT SERPL-MCNC: 7.2 G/DL (ref 6–8.4)
SODIUM SERPL-SCNC: 140 MMOL/L (ref 136–145)
TRIGL SERPL-MCNC: 90 MG/DL (ref 30–150)

## 2023-02-03 PROCEDURE — 1159F PR MEDICATION LIST DOCUMENTED IN MEDICAL RECORD: ICD-10-PCS | Mod: CPTII,S$GLB,, | Performed by: FAMILY MEDICINE

## 2023-02-03 PROCEDURE — 36415 COLL VENOUS BLD VENIPUNCTURE: CPT | Mod: PO | Performed by: FAMILY MEDICINE

## 2023-02-03 PROCEDURE — 1160F RVW MEDS BY RX/DR IN RCRD: CPT | Mod: CPTII,S$GLB,, | Performed by: FAMILY MEDICINE

## 2023-02-03 PROCEDURE — 80061 LIPID PANEL: CPT | Performed by: FAMILY MEDICINE

## 2023-02-03 PROCEDURE — 1159F MED LIST DOCD IN RCRD: CPT | Mod: CPTII,S$GLB,, | Performed by: FAMILY MEDICINE

## 2023-02-03 PROCEDURE — 3079F PR MOST RECENT DIASTOLIC BLOOD PRESSURE 80-89 MM HG: ICD-10-PCS | Mod: CPTII,S$GLB,, | Performed by: FAMILY MEDICINE

## 2023-02-03 PROCEDURE — 3008F BODY MASS INDEX DOCD: CPT | Mod: CPTII,S$GLB,, | Performed by: FAMILY MEDICINE

## 2023-02-03 PROCEDURE — 99215 OFFICE O/P EST HI 40 MIN: CPT | Mod: S$GLB,,, | Performed by: FAMILY MEDICINE

## 2023-02-03 PROCEDURE — 99215 PR OFFICE/OUTPT VISIT, EST, LEVL V, 40-54 MIN: ICD-10-PCS | Mod: S$GLB,,, | Performed by: FAMILY MEDICINE

## 2023-02-03 PROCEDURE — 1160F PR REVIEW ALL MEDS BY PRESCRIBER/CLIN PHARMACIST DOCUMENTED: ICD-10-PCS | Mod: CPTII,S$GLB,, | Performed by: FAMILY MEDICINE

## 2023-02-03 PROCEDURE — 80053 COMPREHEN METABOLIC PANEL: CPT | Performed by: FAMILY MEDICINE

## 2023-02-03 PROCEDURE — 3008F PR BODY MASS INDEX (BMI) DOCUMENTED: ICD-10-PCS | Mod: CPTII,S$GLB,, | Performed by: FAMILY MEDICINE

## 2023-02-03 PROCEDURE — 99999 PR PBB SHADOW E&M-EST. PATIENT-LVL IV: CPT | Mod: PBBFAC,,, | Performed by: FAMILY MEDICINE

## 2023-02-03 PROCEDURE — 3079F DIAST BP 80-89 MM HG: CPT | Mod: CPTII,S$GLB,, | Performed by: FAMILY MEDICINE

## 2023-02-03 PROCEDURE — 3074F SYST BP LT 130 MM HG: CPT | Mod: CPTII,S$GLB,, | Performed by: FAMILY MEDICINE

## 2023-02-03 PROCEDURE — 99999 PR PBB SHADOW E&M-EST. PATIENT-LVL IV: ICD-10-PCS | Mod: PBBFAC,,, | Performed by: FAMILY MEDICINE

## 2023-02-03 PROCEDURE — 3074F PR MOST RECENT SYSTOLIC BLOOD PRESSURE < 130 MM HG: ICD-10-PCS | Mod: CPTII,S$GLB,, | Performed by: FAMILY MEDICINE

## 2023-02-03 RX ORDER — ESCITALOPRAM OXALATE 10 MG/1
10 TABLET ORAL DAILY
Qty: 90 TABLET | Refills: 3 | Status: SHIPPED | OUTPATIENT
Start: 2023-02-03 | End: 2024-02-26

## 2023-02-03 NOTE — PROGRESS NOTES
Subjective:       Patient ID: Monster Crespo is a 52 y.o. male.    Chief Complaint: Follow-up    52 years old male came to the clinic with bilateral ringing in the ears.  Patient with good compliance with cholesterol regimen .  He is requesting medicine for depression needs to be adjusted.  No suicidal or homicidal ideations.  Patient due for his colonoscopy.    Follow-up  Pertinent negatives include no chest pain.   Review of Systems   Constitutional: Negative.    HENT: Negative.     Eyes: Negative.    Respiratory: Negative.     Cardiovascular: Negative.  Negative for chest pain, palpitations, leg swelling and claudication.   Gastrointestinal: Negative.    Genitourinary: Negative.    Musculoskeletal: Negative.    Integumentary:  Negative.   Neurological: Negative.    Psychiatric/Behavioral: Negative.         Objective:      Physical Exam  Vitals and nursing note reviewed.   Constitutional:       General: He is not in acute distress.     Appearance: He is well-developed. He is not diaphoretic.   HENT:      Head: Normocephalic and atraumatic.      Right Ear: External ear normal.      Left Ear: External ear normal.      Nose: Nose normal.      Mouth/Throat:      Pharynx: No oropharyngeal exudate.   Eyes:      General: No scleral icterus.        Right eye: No discharge.         Left eye: No discharge.      Conjunctiva/sclera: Conjunctivae normal.      Pupils: Pupils are equal, round, and reactive to light.   Neck:      Thyroid: No thyromegaly.      Vascular: No JVD.      Trachea: No tracheal deviation.   Cardiovascular:      Rate and Rhythm: Normal rate and regular rhythm.      Heart sounds: Normal heart sounds. No murmur heard.    No friction rub. No gallop.   Pulmonary:      Effort: Pulmonary effort is normal. No respiratory distress.      Breath sounds: Normal breath sounds. No stridor. No wheezing or rales.   Chest:      Chest wall: No tenderness.   Abdominal:      General: Bowel sounds are normal. There is no  distension.      Palpations: Abdomen is soft. There is no mass.      Tenderness: There is no abdominal tenderness. There is no guarding or rebound.   Musculoskeletal:         General: No tenderness. Normal range of motion.      Cervical back: Normal range of motion and neck supple.   Lymphadenopathy:      Cervical: No cervical adenopathy.   Skin:     General: Skin is warm and dry.      Coloration: Skin is not pale.      Findings: No erythema or rash.   Neurological:      Mental Status: He is alert and oriented to person, place, and time.      Cranial Nerves: No cranial nerve deficit.      Motor: No abnormal muscle tone.      Coordination: Coordination normal.      Deep Tendon Reflexes: Reflexes are normal and symmetric. Reflexes normal.   Psychiatric:         Behavior: Behavior normal.         Thought Content: Thought content normal.         Judgment: Judgment normal.       Assessment:       Problem List Items Addressed This Visit       Dyslipidemia - Primary    Relevant Orders    Comprehensive Metabolic Panel    Lipid Panel    Comprehensive Metabolic Panel    Lipid Panel     Other Visit Diagnoses       Tinnitus aurium, bilateral        Relevant Orders    Ambulatory referral/consult to ENT    Screen for colon cancer        Relevant Orders    Case Request Endoscopy: COLONOSCOPY (Completed)    Moderate episode of recurrent major depressive disorder        Relevant Medications    EScitalopram oxalate (LEXAPRO) 10 MG tablet              Plan:         Monster was seen today for follow-up.    Diagnoses and all orders for this visit:    Dyslipidemia  -     Comprehensive Metabolic Panel; Future  -     Lipid Panel; Future  -     Comprehensive Metabolic Panel; Future  -     Lipid Panel; Future    Tinnitus aurium, bilateral  -     Ambulatory referral/consult to ENT; Future    Screen for colon cancer  -     Case Request Endoscopy: COLONOSCOPY    Moderate episode of recurrent major depressive disorder  -     EScitalopram oxalate  (LEXAPRO) 10 MG tablet; Take 1 tablet (10 mg total) by mouth once daily.

## 2023-02-08 ENCOUNTER — TELEPHONE (OUTPATIENT)
Dept: ADMINISTRATIVE | Facility: OTHER | Age: 53
End: 2023-02-08
Payer: COMMERCIAL

## 2023-02-09 ENCOUNTER — TELEPHONE (OUTPATIENT)
Dept: ADMINISTRATIVE | Facility: OTHER | Age: 53
End: 2023-02-09
Payer: COMMERCIAL

## 2023-03-14 NOTE — PROGRESS NOTES
Patient states she started having bilateral eye itching, swelling yesterday.  Denies food allergies.  Takes Labetalol for HTN x 2-3 years.  Denies throat closing, itching, SOB. Subjective:       Patient ID: Monster Crespo is a 52 y.o. male.    Chief Complaint: Dyslipidemia    52 years old male came to the clinic for cholesterol check.  No chest pain, palpitation, orthopnea PND.  He reports episodic headache associated with stress.  Patient also with left knee pain associated with activity.  He is currently asymptomatic.  Patient due for his colonoscopy and pneumonia shot.    Review of Systems   Constitutional: Negative.    HENT: Negative.    Eyes: Negative.    Respiratory: Negative.    Cardiovascular: Negative.  Negative for chest pain, palpitations, leg swelling and claudication.   Gastrointestinal: Negative.    Genitourinary: Negative.    Musculoskeletal: Negative.    Integumentary:  Negative.   Neurological: Positive for headaches.   Psychiatric/Behavioral: Negative.          Objective:      Physical Exam  Vitals and nursing note reviewed.   Constitutional:       General: He is not in acute distress.     Appearance: He is well-developed. He is not diaphoretic.   HENT:      Head: Normocephalic and atraumatic.      Right Ear: External ear normal.      Left Ear: External ear normal.      Nose: Nose normal.      Mouth/Throat:      Pharynx: No oropharyngeal exudate.   Eyes:      General: No scleral icterus.        Right eye: No discharge.         Left eye: No discharge.      Conjunctiva/sclera: Conjunctivae normal.      Pupils: Pupils are equal, round, and reactive to light.   Neck:      Thyroid: No thyromegaly.      Vascular: No JVD.      Trachea: No tracheal deviation.   Cardiovascular:      Rate and Rhythm: Normal rate and regular rhythm.      Heart sounds: Normal heart sounds. No murmur heard.    No friction rub. No gallop.   Pulmonary:      Effort: Pulmonary effort is normal. No respiratory distress.      Breath sounds: Normal breath sounds. No stridor. No wheezing or rales.   Chest:      Chest wall: No tenderness.   Abdominal:      General: Bowel sounds are normal. There is no  distension.      Palpations: Abdomen is soft. There is no mass.      Tenderness: There is no abdominal tenderness. There is no guarding or rebound.   Musculoskeletal:         General: No tenderness. Normal range of motion.      Cervical back: Normal range of motion and neck supple.   Lymphadenopathy:      Cervical: No cervical adenopathy.   Skin:     General: Skin is warm and dry.      Coloration: Skin is not pale.      Findings: No erythema or rash.   Neurological:      Mental Status: He is alert and oriented to person, place, and time.      Cranial Nerves: No cranial nerve deficit.      Motor: No abnormal muscle tone.      Coordination: Coordination normal.      Deep Tendon Reflexes: Reflexes are normal and symmetric. Reflexes normal.   Psychiatric:         Behavior: Behavior normal.         Thought Content: Thought content normal.         Judgment: Judgment normal.         Assessment:       Problem List Items Addressed This Visit    None     Visit Diagnoses     Episodic tension-type headache, not intractable    -  Primary    Mixed hyperlipidemia        Relevant Orders    CBC Auto Differential    Comprehensive Metabolic Panel    Lipid Panel    TSH    Urinalysis    Screen for colon cancer        Relevant Orders    Case Request Endoscopy: COLONOSCOPY (Completed)    Chronic pain of left knee        Relevant Orders    Ambulatory referral/consult to Orthopedics    Need for vaccination against Streptococcus pneumoniae        Relevant Orders    Pneumococcal Polysaccharide Vaccine (23 Valent) (SQ/IM) (Completed)          Plan:         Monster was seen today for dyslipidemia.    Diagnoses and all orders for this visit:    Episodic tension-type headache, not intractable    Mixed hyperlipidemia  -     CBC Auto Differential; Future  -     Comprehensive Metabolic Panel; Future  -     Lipid Panel; Future  -     TSH; Future  -     Urinalysis; Future    Screen for colon cancer  -     Case Request Endoscopy: COLONOSCOPY    Chronic  pain of left knee  -     Ambulatory referral/consult to Orthopedics; Future    Need for vaccination against Streptococcus pneumoniae  -     Pneumococcal Polysaccharide Vaccine (23 Valent) (SQ/IM)    Tylenol p.r.n. for pain.

## 2023-03-27 ENCOUNTER — CLINICAL SUPPORT (OUTPATIENT)
Dept: OTOLARYNGOLOGY | Facility: CLINIC | Age: 53
End: 2023-03-27
Payer: COMMERCIAL

## 2023-03-27 ENCOUNTER — TELEPHONE (OUTPATIENT)
Dept: OTOLARYNGOLOGY | Facility: CLINIC | Age: 53
End: 2023-03-27
Payer: COMMERCIAL

## 2023-03-27 ENCOUNTER — OFFICE VISIT (OUTPATIENT)
Dept: OTOLARYNGOLOGY | Facility: CLINIC | Age: 53
End: 2023-03-27
Payer: COMMERCIAL

## 2023-03-27 VITALS
DIASTOLIC BLOOD PRESSURE: 83 MMHG | WEIGHT: 164.69 LBS | HEIGHT: 66 IN | SYSTOLIC BLOOD PRESSURE: 136 MMHG | HEART RATE: 81 BPM | BODY MASS INDEX: 26.47 KG/M2

## 2023-03-27 DIAGNOSIS — H61.23 BILATERAL IMPACTED CERUMEN: Primary | ICD-10-CM

## 2023-03-27 DIAGNOSIS — H60.63 CHRONIC OTITIS EXTERNA OF BOTH EARS, UNSPECIFIED TYPE: ICD-10-CM

## 2023-03-27 DIAGNOSIS — H93.13 TINNITUS OF BOTH EARS: ICD-10-CM

## 2023-03-27 DIAGNOSIS — H93.13 TINNITUS OF BOTH EARS: Primary | ICD-10-CM

## 2023-03-27 PROCEDURE — 3079F DIAST BP 80-89 MM HG: CPT | Mod: CPTII,S$GLB,, | Performed by: OTOLARYNGOLOGY

## 2023-03-27 PROCEDURE — 92552 PURE TONE AUDIOMETRY AIR: CPT | Mod: S$GLB,,, | Performed by: PHYSICIAN ASSISTANT

## 2023-03-27 PROCEDURE — 1159F MED LIST DOCD IN RCRD: CPT | Mod: CPTII,S$GLB,, | Performed by: OTOLARYNGOLOGY

## 2023-03-27 PROCEDURE — 69210 REMOVE IMPACTED EAR WAX UNI: CPT | Mod: S$GLB,,, | Performed by: OTOLARYNGOLOGY

## 2023-03-27 PROCEDURE — 92556 SPEECH AUDIOMETRY COMPLETE: CPT | Mod: S$GLB,,, | Performed by: PHYSICIAN ASSISTANT

## 2023-03-27 PROCEDURE — 99204 OFFICE O/P NEW MOD 45 MIN: CPT | Mod: 25,S$GLB,, | Performed by: OTOLARYNGOLOGY

## 2023-03-27 PROCEDURE — 69210 EAR CERUMEN REMOVAL: ICD-10-PCS | Mod: S$GLB,,, | Performed by: OTOLARYNGOLOGY

## 2023-03-27 PROCEDURE — 1159F PR MEDICATION LIST DOCUMENTED IN MEDICAL RECORD: ICD-10-PCS | Mod: CPTII,S$GLB,, | Performed by: OTOLARYNGOLOGY

## 2023-03-27 PROCEDURE — 3079F PR MOST RECENT DIASTOLIC BLOOD PRESSURE 80-89 MM HG: ICD-10-PCS | Mod: CPTII,S$GLB,, | Performed by: OTOLARYNGOLOGY

## 2023-03-27 PROCEDURE — 92556 PR SPEECH AUDIOMETRY, COMPLETE: ICD-10-PCS | Mod: S$GLB,,, | Performed by: PHYSICIAN ASSISTANT

## 2023-03-27 PROCEDURE — 92552 PR PURE TONE AUDIOMETRY, AIR: ICD-10-PCS | Mod: S$GLB,,, | Performed by: PHYSICIAN ASSISTANT

## 2023-03-27 PROCEDURE — 99999 PR PBB SHADOW E&M-EST. PATIENT-LVL III: ICD-10-PCS | Mod: PBBFAC,,, | Performed by: OTOLARYNGOLOGY

## 2023-03-27 PROCEDURE — 92567 TYMPANOMETRY: CPT | Mod: S$GLB,,, | Performed by: PHYSICIAN ASSISTANT

## 2023-03-27 PROCEDURE — 99204 PR OFFICE/OUTPT VISIT, NEW, LEVL IV, 45-59 MIN: ICD-10-PCS | Mod: 25,S$GLB,, | Performed by: OTOLARYNGOLOGY

## 2023-03-27 PROCEDURE — 99999 PR PBB SHADOW E&M-EST. PATIENT-LVL I: CPT | Mod: PBBFAC,,, | Performed by: PHYSICIAN ASSISTANT

## 2023-03-27 PROCEDURE — 3075F PR MOST RECENT SYSTOLIC BLOOD PRESS GE 130-139MM HG: ICD-10-PCS | Mod: CPTII,S$GLB,, | Performed by: OTOLARYNGOLOGY

## 2023-03-27 PROCEDURE — 3008F BODY MASS INDEX DOCD: CPT | Mod: CPTII,S$GLB,, | Performed by: OTOLARYNGOLOGY

## 2023-03-27 PROCEDURE — 99999 PR PBB SHADOW E&M-EST. PATIENT-LVL III: CPT | Mod: PBBFAC,,, | Performed by: OTOLARYNGOLOGY

## 2023-03-27 PROCEDURE — 99999 PR PBB SHADOW E&M-EST. PATIENT-LVL I: ICD-10-PCS | Mod: PBBFAC,,, | Performed by: PHYSICIAN ASSISTANT

## 2023-03-27 PROCEDURE — 3008F PR BODY MASS INDEX (BMI) DOCUMENTED: ICD-10-PCS | Mod: CPTII,S$GLB,, | Performed by: OTOLARYNGOLOGY

## 2023-03-27 PROCEDURE — 92567 PR TYMPA2METRY: ICD-10-PCS | Mod: S$GLB,,, | Performed by: PHYSICIAN ASSISTANT

## 2023-03-27 PROCEDURE — 3075F SYST BP GE 130 - 139MM HG: CPT | Mod: CPTII,S$GLB,, | Performed by: OTOLARYNGOLOGY

## 2023-03-27 NOTE — PATIENT INSTRUCTIONS
I would recommend the use of a wax softening drop, either over the counter Debrox, baby oil, or mineral oil, on a weekly basis.  I also instructed the patient to avoid Qtips.

## 2023-03-27 NOTE — PROGRESS NOTES
Chief Complaint   Patient presents with    Other     Ringing in ears        HPI:  Patient is a very pleasant 53 y.o. male here to see me today for the first time for evaluation of tinnitus. reports tinnitus that has been gradually progressing over the last fewmonth(s).  He notes that it is primarily bilateral. He states that the tinnitus does not interfere with communication, concentration, sleep, and enjoyment of life. He  also admits to hearing loss that has been gradually progressing over the last few months.  He has not noted any difference in hearing between the ears, with both ears being the better hearing ear. He has not had any recent issues with ear pain or ear drainage. He denies a family history of hearing loss, and has not had any previous otologic surgery. He has any history of significant loud noise exposure.He denies issues with dizziness.  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  He notes that he has had issues with cerumen impaction prior.  He does not report any itching or other skin conditions of the ear canal.            Past Medical History:   Diagnosis Date    GERD (gastroesophageal reflux disease)     Insomnia      Social History     Socioeconomic History    Marital status: Single   Tobacco Use    Smoking status: Never    Smokeless tobacco: Never   Substance and Sexual Activity    Alcohol use: Yes     Comment: 1 a month     No past surgical history on file.  Family History   Problem Relation Age of Onset    Hypertension Mother     Amblyopia Neg Hx     Blindness Neg Hx     Cataracts Neg Hx     Glaucoma Neg Hx     Macular degeneration Neg Hx     Retinal detachment Neg Hx     Strabismus Neg Hx            Review of Systems  General: negative for chills, fever or weight loss  Psychological: negative for mood changes or depression  Ophthalmic: negative for blurry vision, photophobia or eye pain  ENT: see HPI  Respiratory: no cough, shortness of  breath, or wheezing  Cardiovascular: no chest pain or dyspnea on exertion  Gastrointestinal: no abdominal pain, change in bowel habits, or black/ bloody stools  Musculoskeletal: negative for gait disturbance or muscular weakness  Neurological: no syncope or seizures; no ataxia  Dermatological: negative for pruritis,  rash and jaundice  Hematologic/lymphatic: no easy bruising, no new adenopathy      Physical Exam:    Vitals:    03/27/23 1041   BP: 136/83   Pulse: 81       Physical Exam      Ear Cerumen Removal    Date/Time: 3/27/2023 10:40 AM  Performed by: Venessa Tapia MD  Authorized by: Venessa Tapia MD     Consent Done?:  Yes (Verbal)    Local anesthetic:  None  Medication Used:  Debrox  Location details:  Both ears  Procedure type: curette    Procedure type comment:  Suction  Cerumen  Removal Results:  Cerumen completely removed  Patient tolerance:  Patient tolerated the procedure well with no immediate complications     Dry skin and skin irritation bilateral ear canals, consistent with chronic otitis externa.  Acutely dizzy with suctioning peroxide out of the left ear.  He sat for an extended period until nausea and vertigo past.  He does report that he has had his ear cleaned many times, but has never had suctioning done or this affect with wax removal.    Audiogram: Interpreted by me and reviewed with the patient today.  Hearing within normal limits except very slight sensorineural loss on right side at 8000 hertz.  Tympanograms type a bilaterally.        Assessment:    ICD-10-CM ICD-9-CM    1. Bilateral impacted cerumen  H61.23 380.4 Ear Cerumen Removal      2. Chronic otitis externa of both ears, unspecified type  H60.63 380.23       3. Tinnitus of both ears  H93.13 388.30         The primary encounter diagnosis was Bilateral impacted cerumen. Diagnoses of Chronic otitis externa of both ears, unspecified type and Tinnitus of both ears were also pertinent to this visit.      Plan:    Hearing protection  in noise.     I would recommend the use of a wax softening drop, either over the counter Debrox, baby oil, or mineral oil, on a weekly basis.  I also instructed the patient to avoid Qtips.      Venessa Tapia MD

## 2023-03-27 NOTE — PROGRESS NOTES
Monster Crespo, a 53 y.o. male, was seen today in the clinic for an audiologic evaluation.  Patients main complaint was tinnitus.      Audiogram results revealed a mild loss at 8k Hz in the right ear and normal hearing sensitivity in the left ear.  Speech reception thresholds were noted at 10 dB in the right ear and 10 dB in the left ear.  Speech discrimination scores were 96% in the right ear and 96% in the left ear.  Tympanometry revealed Type A in the right ear and Type A in the left ear.     Recommendations:  Otologic evaluation  Annual audiogram  Hearing protection when in noise

## 2023-05-09 ENCOUNTER — TELEPHONE (OUTPATIENT)
Dept: GASTROENTEROLOGY | Facility: CLINIC | Age: 53
End: 2023-05-09
Payer: COMMERCIAL

## 2023-07-17 ENCOUNTER — TELEPHONE (OUTPATIENT)
Dept: FAMILY MEDICINE | Facility: CLINIC | Age: 53
End: 2023-07-17
Payer: COMMERCIAL

## 2023-07-17 NOTE — TELEPHONE ENCOUNTER
Left voice message for patient stating to call back for appointment rescheduling due to provider not being in office.

## 2023-07-27 ENCOUNTER — TELEPHONE (OUTPATIENT)
Dept: GASTROENTEROLOGY | Facility: CLINIC | Age: 53
End: 2023-07-27
Payer: COMMERCIAL

## 2023-07-27 NOTE — TELEPHONE ENCOUNTER
Contacted patient to schedule a screening colonoscopy . Patient has refused to schedule. Patient will call back to schedule.

## 2023-10-07 DIAGNOSIS — F32.A DEPRESSION, UNSPECIFIED DEPRESSION TYPE: ICD-10-CM

## 2023-10-07 DIAGNOSIS — G47.00 INSOMNIA, UNSPECIFIED TYPE: ICD-10-CM

## 2023-10-07 NOTE — TELEPHONE ENCOUNTER
No care due was identified.  [facility] Embedded Care Due Messages. Reference number: [id number].   10/07/2023 7:20:37 AM CDT

## 2023-10-07 NOTE — TELEPHONE ENCOUNTER
Refill Routing Note   Medication(s) are not appropriate for processing by [facility] for the following reason(s):      Drug-disease interaction    [facility] action(s):  Defer Care Due:  None identified     Medication Therapy Plan: Drug-Disease: doxepin and LUDY (obstructive sleep apnea)        Appointments  past 12m or future 3m with PCP    Date Provider   Last Visit   2/3/2023 James Dawson MD   Next Visit   Visit date not found James Dawson MD   ED visits in past 90 days: 0        Note composed:3:31 PM 10/07/2023

## 2023-10-09 RX ORDER — DOXEPIN HYDROCHLORIDE 100 MG/1
100 CAPSULE ORAL
Qty: 90 CAPSULE | Refills: 3 | Status: SHIPPED | OUTPATIENT
Start: 2023-10-09

## 2024-02-26 DIAGNOSIS — F33.1 MODERATE EPISODE OF RECURRENT MAJOR DEPRESSIVE DISORDER: ICD-10-CM

## 2024-02-26 RX ORDER — ESCITALOPRAM OXALATE 10 MG/1
10 TABLET ORAL
Qty: 90 TABLET | Refills: 0 | Status: SHIPPED | OUTPATIENT
Start: 2024-02-26 | End: 2024-05-30

## 2024-02-26 NOTE — TELEPHONE ENCOUNTER
Care Due:                  Date            Visit Type   Department     Provider  --------------------------------------------------------------------------------                                EP -                              PRIMARY      [facility]  Last Visit: 02-      CARE ([facility])   MEDICINE       James Dawson  Next Visit: None Scheduled  None         None Found                                                            Last  Test          Frequency    Reason                     Performed    Due Date  --------------------------------------------------------------------------------    Office Visit  15 months..  EScitalopram,              02- 04-                             atorvastatin, doxepin....    CMP.........  12 months..  atorvastatin.............  02- 01-    Lipid Panel.  12 months..  atorvastatin.............  02- 01-    [facility] Embedded Care Due Messages. Reference number: [id number].   2/26/2024 12:17:51 AM CST

## 2024-02-26 NOTE — TELEPHONE ENCOUNTER
Provider Staff:  Action required for this patient     Please see care gap opportunities below in Care Due Message.    Thanks!  [facility]     Appointments      Date Provider   Last Visit   2/3/2023 James Dawson MD   Next Visit   Visit date not found James Dawson MD      Refill Decision Note   Monster Crespo  is requesting a refill authorization.  Brief Assessment and Rationale for Refill:  Approve     Medication Therapy Plan:         Comments:     Note composed:4:00 AM 02/26/2024

## 2024-05-30 DIAGNOSIS — F33.1 MODERATE EPISODE OF RECURRENT MAJOR DEPRESSIVE DISORDER: ICD-10-CM

## 2024-05-30 RX ORDER — ESCITALOPRAM OXALATE 10 MG/1
10 TABLET ORAL
Qty: 90 TABLET | Refills: 3 | Status: SHIPPED | OUTPATIENT
Start: 2024-05-30

## 2024-05-30 NOTE — TELEPHONE ENCOUNTER
No care due was identified.  [facility] Embedded Care Due Messages. Reference number: [id number].   5/30/2024 12:26:13 AM CDT

## 2024-08-10 ENCOUNTER — HOSPITAL ENCOUNTER (INPATIENT)
Facility: HOSPITAL | Age: 54
LOS: 2 days | Discharge: HOME | DRG: 063 | End: 2024-08-12
Attending: EMERGENCY MEDICINE | Admitting: FAMILY MEDICINE
Payer: COMMERCIAL

## 2024-08-10 DIAGNOSIS — I63.9 CVA (CEREBRAL VASCULAR ACCIDENT): ICD-10-CM

## 2024-08-10 DIAGNOSIS — R29.818 ACUTE FOCAL NEUROLOGICAL DEFICIT: Primary | ICD-10-CM

## 2024-08-10 DIAGNOSIS — R47.01 APHASIA: ICD-10-CM

## 2024-08-10 PROBLEM — I63.512 CEREBROVASCULAR ACCIDENT (CVA) DUE TO OCCLUSION OF LEFT MIDDLE CEREBRAL ARTERY: Status: ACTIVE | Noted: 2024-08-10

## 2024-08-10 LAB
ALBUMIN SERPL BCP-MCNC: 3.6 G/DL (ref 3.5–5.2)
ALP SERPL-CCNC: 66 U/L (ref 55–135)
ALT SERPL W/O P-5'-P-CCNC: 26 U/L (ref 10–44)
ANION GAP SERPL CALC-SCNC: 8 MMOL/L (ref 8–16)
APTT PPP: 26.2 SEC (ref 21–32)
AST SERPL-CCNC: 23 U/L (ref 10–40)
BASOPHILS # BLD AUTO: 0.03 K/UL (ref 0–0.2)
BASOPHILS NFR BLD: 0.4 % (ref 0–1.9)
BILIRUB SERPL-MCNC: 0.2 MG/DL (ref 0.1–1)
BUN SERPL-MCNC: 24 MG/DL (ref 6–20)
CALCIUM SERPL-MCNC: 8.9 MG/DL (ref 8.7–10.5)
CHLORIDE SERPL-SCNC: 107 MMOL/L (ref 95–110)
CHOLEST SERPL-MCNC: 220 MG/DL (ref 120–199)
CHOLEST/HDLC SERPL: 6.7 {RATIO} (ref 2–5)
CO2 SERPL-SCNC: 24 MMOL/L (ref 23–29)
CREAT SERPL-MCNC: 1 MG/DL (ref 0.5–1.4)
CREAT SERPL-MCNC: 1.1 MG/DL (ref 0.5–1.4)
DELSYS: NORMAL
DELSYS: NORMAL
DIFFERENTIAL METHOD BLD: ABNORMAL
EOSINOPHIL # BLD AUTO: 0.3 K/UL (ref 0–0.5)
EOSINOPHIL NFR BLD: 4.5 % (ref 0–8)
ERYTHROCYTE [DISTWIDTH] IN BLOOD BY AUTOMATED COUNT: 14.2 % (ref 11.5–14.5)
EST. GFR  (NO RACE VARIABLE): >60 ML/MIN/1.73 M^2
ESTIMATED AVG GLUCOSE: 114 MG/DL (ref 68–131)
GLUCOSE SERPL-MCNC: 95 MG/DL (ref 70–110)
HBA1C MFR BLD: 5.6 % (ref 4–5.6)
HCT VFR BLD AUTO: 41.1 % (ref 40–54)
HDLC SERPL-MCNC: 33 MG/DL (ref 40–75)
HDLC SERPL: 15 % (ref 20–50)
HGB BLD-MCNC: 13.8 G/DL (ref 14–18)
IMM GRANULOCYTES # BLD AUTO: 0.03 K/UL (ref 0–0.04)
IMM GRANULOCYTES NFR BLD AUTO: 0.4 % (ref 0–0.5)
LDLC SERPL CALC-MCNC: 133 MG/DL (ref 63–159)
LYMPHOCYTES # BLD AUTO: 2 K/UL (ref 1–4.8)
LYMPHOCYTES NFR BLD: 29.5 % (ref 18–48)
MCH RBC QN AUTO: 29.4 PG (ref 27–31)
MCHC RBC AUTO-ENTMCNC: 33.6 G/DL (ref 32–36)
MCV RBC AUTO: 88 FL (ref 82–98)
MONOCYTES # BLD AUTO: 0.5 K/UL (ref 0.3–1)
MONOCYTES NFR BLD: 6.9 % (ref 4–15)
NEUTROPHILS # BLD AUTO: 4 K/UL (ref 1.8–7.7)
NEUTROPHILS NFR BLD: 58.3 % (ref 38–73)
NONHDLC SERPL-MCNC: 187 MG/DL
NRBC BLD-RTO: 0 /100 WBC
PLATELET # BLD AUTO: 267 K/UL (ref 150–450)
PMV BLD AUTO: 11.8 FL (ref 9.2–12.9)
POC PTINR: 1.2 (ref 0.9–1.2)
POCT GLUCOSE: 132 MG/DL (ref 70–110)
POTASSIUM SERPL-SCNC: 4.2 MMOL/L (ref 3.5–5.1)
PROT SERPL-MCNC: 6.8 G/DL (ref 6–8.4)
RBC # BLD AUTO: 4.69 M/UL (ref 4.6–6.2)
SAMPLE: NORMAL
SAMPLE: NORMAL
SITE: NORMAL
SITE: NORMAL
SODIUM SERPL-SCNC: 139 MMOL/L (ref 136–145)
TRIGL SERPL-MCNC: 270 MG/DL (ref 30–150)
TSH SERPL DL<=0.005 MIU/L-ACNC: 2.19 UIU/ML (ref 0.4–4)
WBC # BLD AUTO: 6.91 K/UL (ref 3.9–12.7)

## 2024-08-10 PROCEDURE — 85730 THROMBOPLASTIN TIME PARTIAL: CPT | Performed by: EMERGENCY MEDICINE

## 2024-08-10 PROCEDURE — 99285 EMERGENCY DEPT VISIT HI MDM: CPT | Mod: 25

## 2024-08-10 PROCEDURE — A4216 STERILE WATER/SALINE, 10 ML: HCPCS | Performed by: EMERGENCY MEDICINE

## 2024-08-10 PROCEDURE — 63600175 PHARM REV CODE 636 W HCPCS: Mod: JG | Performed by: EMERGENCY MEDICINE

## 2024-08-10 PROCEDURE — 80053 COMPREHEN METABOLIC PANEL: CPT | Performed by: EMERGENCY MEDICINE

## 2024-08-10 PROCEDURE — 93005 ELECTROCARDIOGRAM TRACING: CPT

## 2024-08-10 PROCEDURE — 25500020 PHARM REV CODE 255: Performed by: EMERGENCY MEDICINE

## 2024-08-10 PROCEDURE — 96374 THER/PROPH/DIAG INJ IV PUSH: CPT

## 2024-08-10 PROCEDURE — 3E03317 INTRODUCTION OF OTHER THROMBOLYTIC INTO PERIPHERAL VEIN, PERCUTANEOUS APPROACH: ICD-10-PCS | Performed by: INTERNAL MEDICINE

## 2024-08-10 PROCEDURE — 93010 ELECTROCARDIOGRAM REPORT: CPT | Mod: ,,, | Performed by: INTERNAL MEDICINE

## 2024-08-10 PROCEDURE — 12000002 HC ACUTE/MED SURGE SEMI-PRIVATE ROOM

## 2024-08-10 PROCEDURE — 83036 HEMOGLOBIN GLYCOSYLATED A1C: CPT | Performed by: EMERGENCY MEDICINE

## 2024-08-10 PROCEDURE — 20000000 HC ICU ROOM

## 2024-08-10 PROCEDURE — 85025 COMPLETE CBC W/AUTO DIFF WBC: CPT | Performed by: EMERGENCY MEDICINE

## 2024-08-10 PROCEDURE — 25000003 PHARM REV CODE 250: Performed by: EMERGENCY MEDICINE

## 2024-08-10 PROCEDURE — G0427 INPT/ED TELECONSULT70: HCPCS | Mod: GT,,, | Performed by: PSYCHIATRY & NEUROLOGY

## 2024-08-10 PROCEDURE — 37195 THROMBOLYTIC THERAPY STROKE: CPT

## 2024-08-10 PROCEDURE — 80061 LIPID PANEL: CPT | Performed by: EMERGENCY MEDICINE

## 2024-08-10 PROCEDURE — 82962 GLUCOSE BLOOD TEST: CPT

## 2024-08-10 PROCEDURE — 84443 ASSAY THYROID STIM HORMONE: CPT | Performed by: EMERGENCY MEDICINE

## 2024-08-10 RX ORDER — MUPIROCIN 20 MG/G
OINTMENT TOPICAL 2 TIMES DAILY
Status: DISCONTINUED | OUTPATIENT
Start: 2024-08-11 | End: 2024-08-12 | Stop reason: HOSPADM

## 2024-08-10 RX ORDER — SODIUM CHLORIDE 0.9 % (FLUSH) 0.9 %
10 SYRINGE (ML) INJECTION
Status: DISCONTINUED | OUTPATIENT
Start: 2024-08-10 | End: 2024-08-12 | Stop reason: HOSPADM

## 2024-08-10 RX ORDER — SODIUM CHLORIDE 0.9 % (FLUSH) 0.9 %
10 SYRINGE (ML) INJECTION ONCE
Status: COMPLETED | OUTPATIENT
Start: 2024-08-10 | End: 2024-08-10

## 2024-08-10 RX ORDER — POLYETHYLENE GLYCOL 3350 17 G/17G
17 POWDER, FOR SOLUTION ORAL DAILY
Status: DISCONTINUED | OUTPATIENT
Start: 2024-08-11 | End: 2024-08-10

## 2024-08-10 RX ORDER — BISACODYL 10 MG/1
10 SUPPOSITORY RECTAL DAILY PRN
Status: DISCONTINUED | OUTPATIENT
Start: 2024-08-10 | End: 2024-08-12 | Stop reason: HOSPADM

## 2024-08-10 RX ORDER — ONDANSETRON HYDROCHLORIDE 2 MG/ML
4 INJECTION, SOLUTION INTRAVENOUS EVERY 12 HOURS PRN
Status: DISCONTINUED | OUTPATIENT
Start: 2024-08-10 | End: 2024-08-12 | Stop reason: HOSPADM

## 2024-08-10 RX ORDER — ATORVASTATIN CALCIUM 40 MG/1
80 TABLET, FILM COATED ORAL NIGHTLY
Status: DISCONTINUED | OUTPATIENT
Start: 2024-08-10 | End: 2024-08-10

## 2024-08-10 RX ORDER — LABETALOL HYDROCHLORIDE 5 MG/ML
10 INJECTION, SOLUTION INTRAVENOUS
Status: DISCONTINUED | OUTPATIENT
Start: 2024-08-10 | End: 2024-08-12 | Stop reason: HOSPADM

## 2024-08-10 RX ADMIN — Medication 10 ML: at 03:08

## 2024-08-10 RX ADMIN — TENECTEPLASE 18.5 MG: KIT at 03:08

## 2024-08-10 RX ADMIN — IOHEXOL 100 ML: 350 INJECTION, SOLUTION INTRAVENOUS at 05:08

## 2024-08-10 NOTE — ED NOTES
RN notified  transfer center of telestroke consult. RN spoke with Pat at the transfer center. Confirmed that Dr. Pineda notified of consult.

## 2024-08-10 NOTE — SUBJECTIVE & OBJECTIVE
tHPI:  54 y.o. male with sudden onset of word finding difficulty.  No history of weakness. History provided by patient son.      Images personally reviewed and interpreted:  Study: Head CT  Study Interpretation: NO ICH     Assessment and plan:  Word finding difficulty    Lytics recommendation: Recommend IV Tenecteplase 0.25mg/kg IV push (max dose 25mg); If Tenecteplase is not available use Alteplase 0.9mg/kg IV bolus followed by infusion (max dose 90mg)     Additional Recommendations:   Neurological assessment and vital signs (except temperature) every 15 minutes x 2 hours, then every 30 minutes x 6 hours, then every hour x 16 hours..  Frequency of BP assessments may need to be increased if systolic BP stays >= 180 mm Hg or diastolic BP stays >= 105 mm Hg. Administer antihypertensive meds as ordered  Temperature every 4 hours or as required.  Follow hospital protocol for further orders re: post thrombolytic therapy patient management.  No antithrombotics or anticoagulants (including but not limited to: heparin, warfarin, aspirin, clopidigrel, or dipyridamole) for 24 hours, then start antithrombotics as ordered by treating physician    Adapted from the American Heart Association/American Stroke Association (AHA/ASA) and American Association of Neuroscience Nurses (AANN) Guidelines.   Thrombectomy recommendation: Awaiting CTA results from [facility] for determination   Placement recommendation: pending further studies  admit to inpatient      Antithrombotics for secondary stroke prevention: Antiplatelets: None: Hold all Antithrombotics x 24 hours after IV Thrombolytic therapy administration     Statins for secondary stroke prevention and hyperlipidemia, if present:   Statins: Atorvastatin- 80 mg daily     Aggressive risk factor modification: HTN     Rehab efforts: The patient has been evaluated by a stroke team provider and the therapy needs have been fully considered based off the presenting complaints and exam findings.  The following therapy evaluations are needed: PT evaluate and treat, OT evaluate and treat, SLP evaluate and treat     Diagnostics ordered/pending: CTA Head to assess vasculature , CTA Neck/Arch to assess vasculature, HgbA1C to assess blood glucose levels, Lipid Profile to assess cholesterol levels, MRI head without contrast to assess brain parenchyma, TTE to assess cardiac function/status , TSH to assess thyroid function     VTE prophylaxis: Mechanical prophylaxis: Place SCDs     BP parameters: Infarct: Post Thrombolytic therapy, SBP <180

## 2024-08-10 NOTE — ASSESSMENT & PLAN NOTE
Antithrombotics for secondary stroke prevention: Antiplatelets: None: Hold all Antithrombotics x 24 hours after IV Thrombolytic therapy administration    Statins for secondary stroke prevention and hyperlipidemia, if present:   Statins: Atorvastatin- 80 mg daily    Aggressive risk factor modification: HTN     Rehab efforts: The patient has been evaluated by a stroke team provider and the therapy needs have been fully considered based off the presenting complaints and exam findings. The following therapy evaluations are needed: PT evaluate and treat, OT evaluate and treat, SLP evaluate and treat    Diagnostics ordered/pending: CTA Head to assess vasculature , CTA Neck/Arch to assess vasculature, HgbA1C to assess blood glucose levels, Lipid Profile to assess cholesterol levels, MRI head without contrast to assess brain parenchyma, TTE to assess cardiac function/status , TSH to assess thyroid function    VTE prophylaxis: Mechanical prophylaxis: Place SCDs    BP parameters: Infarct: Post Thrombolytic therapy, SBP <180

## 2024-08-10 NOTE — ED NOTES
Dr. Beltrán to EMS stretcher to evaluate patient. Per MD, patient does not meet criteria for code stroke at this time.

## 2024-08-10 NOTE — ED PROVIDER NOTES
Encounter Date: 8/10/2024       History     Chief Complaint   Patient presents with    Altered Mental Status     EMS called to scene for patient with hypotension. Reports patient understands English and Danish. Patient responsive but not speaking. Attempting to speak but unable to. Patient moving all extremities and following commands.      Patient presents with son.  Chief complaint is altered mental status.  The son states that he saw his dad at 1:45 p.m. and that his grandmother was talking to him approximately 15 minutes prior to that.  Last known well would be a proximally 130.  The patient will respond to voice he is not speaking.  He is following some commands.  There is no acute trauma.  No other acute complaints.    The history is provided by the patient and a relative.     Review of patient's allergies indicates:  No Known Allergies  Past Medical History:   Diagnosis Date    GERD (gastroesophageal reflux disease)     Insomnia      History reviewed. No pertinent surgical history.  Family History   Problem Relation Name Age of Onset    Hypertension Mother      Amblyopia Neg Hx      Blindness Neg Hx      Cataracts Neg Hx      Glaucoma Neg Hx      Macular degeneration Neg Hx      Retinal detachment Neg Hx      Strabismus Neg Hx       Social History     Tobacco Use    Smoking status: Never    Smokeless tobacco: Never   Substance Use Topics    Alcohol use: Yes     Comment: 1 a month     Review of Systems   Unable to perform ROS: Acuity of condition       Physical Exam     Initial Vitals   BP Pulse Resp Temp SpO2   08/10/24 1438 08/10/24 1438 08/10/24 1438 08/10/24 1437 08/10/24 1438   106/72 61 12 97.8 °F (36.6 °C) 98 %      MAP       --                Physical Exam    Vitals reviewed.  Constitutional: No distress.   HENT:   Head: Normocephalic and atraumatic.   No traumatic injuries noted to the face area   Cardiovascular:  Normal rate and regular rhythm.           No murmur heard.  Pulmonary/Chest: Breath  sounds normal. He has no wheezes.   Abdominal: Abdomen is soft. There is no abdominal tenderness.     Neurological:   Patient will awakened to voice, he is not speaking, he did squeeze my fingers bilaterally not following any other commands         ED Course   Critical Care    Date/Time: 8/10/2024 6:30 PM    Performed by: Tavon Fallon DO  Authorized by: Tavon Fallon DO  Direct patient critical care time: 15 minutes  Additional history critical care time: 5 minutes  Ordering / reviewing critical care time: 5 minutes  Documentation critical care time: 5 minutes  Consulting other physicians critical care time: 10 minutes  Total critical care time (exclusive of procedural time) : 40 minutes  Critical care time was exclusive of separately billable procedures and treating other patients.  Critical care was necessary to treat or prevent imminent or life-threatening deterioration of the following conditions: CNS failure or compromise.  Critical care was time spent personally by me on the following activities: development of treatment plan with patient or surrogate, discussions with consultants, interpretation of cardiac output measurements, evaluation of patient's response to treatment, examination of patient, obtaining history from patient or surrogate, ordering and performing treatments and interventions, ordering and review of radiographic studies, ordering and review of laboratory studies, re-evaluation of patient's condition and review of old charts.        Labs Reviewed   CBC W/ AUTO DIFFERENTIAL - Abnormal       Result Value    WBC 6.91      RBC 4.69      Hemoglobin 13.8 (*)     Hematocrit 41.1      MCV 88      MCH 29.4      MCHC 33.6      RDW 14.2      Platelets 267      MPV 11.8      Immature Granulocytes 0.4      Gran # (ANC) 4.0      Immature Grans (Abs) 0.03      Lymph # 2.0      Mono # 0.5      Eos # 0.3      Baso # 0.03      nRBC 0      Gran % 58.3      Lymph % 29.5      Mono % 6.9       Eosinophil % 4.5      Basophil % 0.4      Differential Method Automated     COMPREHENSIVE METABOLIC PANEL - Abnormal    Sodium 139      Potassium 4.2      Chloride 107      CO2 24      Glucose 95      BUN 24 (*)     Creatinine 1.0      Calcium 8.9      Total Protein 6.8      Albumin 3.6      Total Bilirubin 0.2      Alkaline Phosphatase 66      AST 23      ALT 26      eGFR >60      Anion Gap 8     LIPID PANEL - Abnormal    Cholesterol 220 (*)     Triglycerides 270 (*)     HDL 33 (*)     LDL Cholesterol 133.0      HDL/Cholesterol Ratio 15.0 (*)     Total Cholesterol/HDL Ratio 6.7 (*)     Non-HDL Cholesterol 187     POCT GLUCOSE - Abnormal    POCT Glucose 132 (*)    TSH    TSH 2.195     APTT    aPTT 26.2     HEMOGLOBIN A1C   HEMOGLOBIN A1C    Hemoglobin A1C 5.6      Estimated Avg Glucose 114     POCT GLUCOSE, HAND-HELD DEVICE   ISTAT PROCEDURE    POC PTINR 1.2      Sample VENOUS      Site Other      DelSys Room Air     ISTAT CREATININE    POC Creatinine 1.1      Sample VENOUS      Site Other      DelSys Room Air            Imaging Results               MRI Brain Without Contrast (Final result)  Result time 08/10/24 23:49:35      Final result by Chance Cabello DO (08/10/24 23:49:35)                   Impression:      Acute infarction in the left frontal lobe and insula.    This report was flagged in Epic as abnormal.      Electronically signed by: Chance Cabello  Date:    08/10/2024  Time:    23:49               Narrative:    EXAMINATION:  MRI BRAIN WITHOUT CONTRAST    CLINICAL HISTORY:  Stroke, follow up;    TECHNIQUE:  Multiplanar multisequence MR imaging of the brain was performed without intravenous contrast.    COMPARISON:  CTA head and neck from earlier today.    FINDINGS:  There is restricted diffusion in the left frontal lobe and the left insular cortex compatible with an acute infarct.  There is associated T2/FLAIR hyperintense signal.  Ventricles are normal in size for age without evidence of  hydrocephalus.  There is no intracranial mass or hemorrhage.  There are several small T2/FLAIR hyperintensities in the supratentorial white matter, nonspecific but can be seen with chronic microvascular ischemic changes.  No extra-axial blood or fluid collections. Normal vascular flow voids.    Bone marrow signal intensity is normal. Paranasal sinuses and mastoid air cells are clear.                                        CTA Head and Neck (xpd) (Final result)  Result time 08/10/24 17:53:04      Final result by Chance Cabello DO (08/10/24 17:53:04)                   Impression:      1. Unremarkable CTA head and neck.  No large vessel occlusion or high-grade stenosis.  2. Suspected subtle loss of gray-white differentiation in the left insular cortex, which can be seen with an acute infarct.  Further evaluation with MRI brain is recommended.  This report was flagged in Epic as abnormal.      Electronically signed by: Chance Cabello  Date:    08/10/2024  Time:    17:53               Narrative:    EXAMINATION:  CTA HEAD AND NECK (XPD)    CLINICAL HISTORY:  Stroke/TIA, determine embolic source;    TECHNIQUE:  Non contrast low dose axial images were obtained though the head. CT angiogram was performed from the level of the gila to the top of the head following the IV administration of 100mL of Omnipaque 350.   Sagittal and coronal reconstructions and maximum intensity projection reconstructions were performed. Arterial stenosis percentages are based on NASCET measurement criteria. An immediate post-contrast CT head was also performed. RapidAI LVO was utilized to measure arterial blood flow in the brain.    COMPARISON:  CT head from earlier the same date.    FINDINGS:  CT head: The ventricles are normal in size without evidence of hydrocephalus. There is suspected subtle loss of gray-white differentiation in the left insular cortex, concerning for an acute infarct.  No parenchymal mass, edema or major vascular  distribution infarct. There is no intracranial hemorrhage (agree with rapid AI assessment).  No extra-axial blood or fluid collection.    The cranium is intact.  Mastoid air cells and paranasal sinuses are clear.  Empty sella configuration noted.      CTA head:    Anterior circulation: The bilateral intracranial ICAs are patent and unremarkable. The bilateral anterior and middle cerebral arteries are within normal limits, without hemodynamically significant stenosis or occlusion.    Posterior circulation: The right vertebral artery is dominant.  The vertebrobasilar system is within normal limits without focal abnormality.  Persistent fetal origin of the right PCA.  The bilateral posterior cerebral arteries are within normal limits, without hemodynamically significant stenosis or occlusion.    There is no intracranial aneurysm.    CTA neck:    The aortic arch maintains a normal branching pattern.    The common and internal carotid arteries are normal in course and caliber. No significant stenosis in either carotid bifurcation.    The vertebral origins are patent. The cervical vertebral arteries are normal in course and caliber.    The soft tissues of the neck are unremarkable.  The visualized lung apices are clear.    There is no acute fracture or subluxation of the cervical spine.                                       CT Head Without Contrast (Final result)  Result time 08/10/24 15:15:10      Final result by Maxwell Choi MD (08/10/24 15:15:10)                   Impression:      No acute intracranial process.  Additional evaluation, as clinically warranted.      Electronically signed by: Maxwell Choi MD  Date:    08/10/2024  Time:    15:15               Narrative:    EXAMINATION:  CT HEAD WITHOUT CONTRAST    CLINICAL HISTORY:  Neuro deficit, acute, stroke suspected;    TECHNIQUE:  Low dose axial images were obtained through the head.  Coronal and sagittal reformations were also performed. Contrast was not  administered.    COMPARISON:  None.    FINDINGS:  The subcutaneous tissue unremarkable.  The bony calvarium is intact.  The paranasal sinuses are unremarkable.  The mastoid air cells are clear.  The orbits and intraorbital contents are within normal limits.    The craniocervical junction is intact.  There is empty sella configuration.  The midline structures are intact.  There is a prominent retrocerebellar CSF space.  There is no evidence of intracranial hemorrhage.  There are no extra-axial fluid collections.  The ventricles and sulci are within normal limits.  The cisterns are unremarkable.  The gray-white differentiation is maintained.  There is no dense vessel sign.  There is no evidence of mass effect.                                       Medications   sodium chloride 0.9% flush 10 mL (has no administration in time range)   sodium chloride 0.9% bolus 500 mL 500 mL (has no administration in time range)   labetaloL injection 10 mg (has no administration in time range)   bisacodyL suppository 10 mg (has no administration in time range)   ondansetron injection 4 mg (has no administration in time range)   mupirocin 2 % ointment ( Nasal Given 8/11/24 0919)   tenecteplase (TNKase) IV KIT 18.5 mg (18.5 mg Intravenous Not Given 8/10/24 1530)     Followed by   sodium chloride 0.9% flush 10 mL (10 mLs Intravenous Given 8/10/24 1527)   iohexoL (OMNIPAQUE 350) injection 100 mL (100 mLs Intravenous Given 8/10/24 1717)     Medical Decision Making  Stroke alert called on my arrival, vascular neurology recommends TNKase, no signs of large vessel occlusion on CTA, admitted to ICU via [facility] who accepts admission.    Amount and/or Complexity of Data Reviewed  Labs: ordered. Decision-making details documented in ED Course.  Radiology: ordered. Decision-making details documented in ED Course.    Risk  Prescription drug management.  Decision regarding hospitalization.  Risk Details: Differential diagnosis  includes but is not limited to:  CVA               ED Course as of 08/11/24 1449   Sat Aug 10, 2024   1509 POCT glucose(!)  Hyperglycemia [CD]   1527 ISTAT CREATININE  Within normal limits [CD]   1527 ISTAT PROCEDURE  Within normal limits [CD]   1527 CT Head Without Contrast  No acute findings [CD]   1539 CBC W/ AUTO DIFFERENTIAL(!)  Nonspecific findings [CD]   1715 Lipid panel(!)  Elevated [CD]   1800 CTA Head and Neck (xpd)(!)  Unremarkable CTA head and neck.  No large vessel occlusion or high-grade stenosis.  2. Suspected subtle loss of gray-white differentiation in the left insular cortex, which can be seen with an acute infarct.  Further evaluation with MRI brain is recommended.  This report was flagged in Epic as abnormal.   [CD]      ED Course User Index  [CD] Tavon Fallon DO                           Clinical Impression:  Final diagnoses:  [R29.818] Acute focal neurological deficit (Primary)  [R47.01] Aphasia          ED Disposition Condition    Admit                 Tavon Fallon DO  08/11/24 1450

## 2024-08-10 NOTE — TELEMEDICINE CONSULT
[facility]  Vascular Neurology  Comprehensive Stroke Center  TeleVascular Neurology Acute Consultation Note        Consult Information  Consults    Consulting Provider: JORGE RIBERA   Current Providers  No providers found    Patient Location:  [facility] EMERGENCY DEPARTMENT Emergency Department    [facility] hospital nurse at bedside with patient assisting consultant.  Patient information was obtained from relative(s).       Stroke Documentation  Acute Stroke Times   Last Known Normal Date: 08/10/24  Last Known Normal Time: 1330  Stroke Team Arrival Date: 08/10/24  Stroke Team Arrival Time: 1511  CT Interpretation Time: 1512    NIH Scale:  Interval: baseline  1a. Level of Consciousness: 0-->Alert, keenly responsive  1b. LOC Questions: 2-->Answers neither question correctly  1c. LOC Commands: 0-->Performs both tasks correctly  2. Best Gaze: 0-->Normal  3. Visual: 0-->No visual loss  4. Facial Palsy: 0-->Normal symmetrical movements  5a. Motor Arm, Left: 0-->No drift, limb holds 90 (or 45) degrees for full 10 secs  5b. Motor Arm, Right: 0-->No drift, limb holds 90 (or 45) degrees for full 10 secs  6a. Motor Leg, Left: 0-->No drift, leg holds 30 degree position for full 5 secs  6b. Motor Leg, Right: 0-->No drift, leg holds 30 degree position for full 5 secs  7. Limb Ataxia: 0-->Absent  8. Sensory: 0-->Normal, no sensory loss  9. Best Language: 2-->Severe aphasia, all communication is through fragmentary expression, great need for inference, questioning, and guessing by the listener. Range of information that can be exchanged is limited, listener carries burden of. . . (see row details)  10. Dysarthria: 0-->Normal  11. Extinction and Inattention (formerly Neglect): 0-->No abnormality  Total (NIH Stroke Scale): 4      Modified [place]: Score: 0  [place] Coma Scale: 11   ABCD2 Score:    [id number] Score:    HAS -BLED Score:    ICH Score:    Hunt & Parr Classification:      Blood pressure 125/71,  "pulse 62, temperature 97.8 °F (36.6 °C), temperature source Oral, resp. rate 20, height 5' 6" (1.676 m), weight 74.8 kg (164 lb 14.5 oz), SpO2 97%.      In my opinion, this was a: Tier 1; VAN Stroke Assessment: Positive     Medical Decision Making  tHPI:  54 y.o. male with sudden onset of word finding difficulty.  No history of weakness. History provided by patient son.      Images personally reviewed and interpreted:  Study: Head CT  Study Interpretation: NO ICH     Assessment and plan:  Word finding difficulty    Lytics recommendation: Recommend IV Tenecteplase 0.25mg/kg IV push (max dose 25mg); If Tenecteplase is not available use Alteplase 0.9mg/kg IV bolus followed by infusion (max dose 90mg)     Additional Recommendations:   Neurological assessment and vital signs (except temperature) every 15 minutes x 2 hours, then every 30 minutes x 6 hours, then every hour x 16 hours..  Frequency of BP assessments may need to be increased if systolic BP stays >= 180 mm Hg or diastolic BP stays >= 105 mm Hg. Administer antihypertensive meds as ordered  Temperature every 4 hours or as required.  Follow hospital protocol for further orders re: post thrombolytic therapy patient management.  No antithrombotics or anticoagulants (including but not limited to: heparin, warfarin, aspirin, clopidigrel, or dipyridamole) for 24 hours, then start antithrombotics as ordered by treating physician    Adapted from the American Heart Association/American Stroke Association (AHA/ASA) and American Association of Neuroscience Nurses (AANN) Guidelines.   Thrombectomy recommendation: Awaiting CTA results from [facility] for determination   Placement recommendation: pending further studies  admit to inpatient      Antithrombotics for secondary stroke prevention: Antiplatelets: None: Hold all Antithrombotics x 24 hours after IV Thrombolytic therapy administration     Statins for secondary stroke prevention and hyperlipidemia, if present:   Statins: " Atorvastatin- 80 mg daily     Aggressive risk factor modification: HTN     Rehab efforts: The patient has been evaluated by a stroke team provider and the therapy needs have been fully considered based off the presenting complaints and exam findings. The following therapy evaluations are needed: PT evaluate and treat, OT evaluate and treat, SLP evaluate and treat     Diagnostics ordered/pending: CTA Head to assess vasculature , CTA Neck/Arch to assess vasculature, HgbA1C to assess blood glucose levels, Lipid Profile to assess cholesterol levels, MRI head without contrast to assess brain parenchyma, TTE to assess cardiac function/status , TSH to assess thyroid function     VTE prophylaxis: Mechanical prophylaxis: Place SCDs     BP parameters: Infarct: Post Thrombolytic therapy, SBP <180         Review of Systems   Neurological:  Positive for speech change.     Physical Exam  Vitals reviewed.   Constitutional:       Appearance: Normal appearance. He is normal weight.   HENT:      Head: Normocephalic and atraumatic.   Eyes:      Extraocular Movements: Extraocular movements intact.      Pupils: Pupils are equal, round, and reactive to light.   Pulmonary:      Effort: Pulmonary effort is normal.   Neurological:      Mental Status: He is alert.      Comments: Expressive aphasia       Past Medical History:   Diagnosis Date    GERD (gastroesophageal reflux disease)     Insomnia      No past surgical history on file.  Family History   Problem Relation Name Age of Onset    Hypertension Mother      Amblyopia Neg Hx      Blindness Neg Hx      Cataracts Neg Hx      Glaucoma Neg Hx      Macular degeneration Neg Hx      Retinal detachment Neg Hx      Strabismus Neg Hx         Diagnoses  Problem Noted   Cerebrovascular Accident (Cva) Due to Occlusion of Left Middle Cerebral Artery 8/10/2024       Jarad Pineda MD      Emergent/Acute neurological consultation requested by spoke provider due to critical concerns for possible  cerebrovascular event that could result in permanent loss of neurologic/bodily function, severe disability or death of this patient.  Immediate/timely evaluation by a highly prepared expert is paramount for optimal outcomes  High risk for neurological deterioration if not properly diagnosed  High risk for neurological deterioration if not treated promplty/as soon as possible  Complex diagnostic evaluation may be required (advanced imaging)  High risk treatment options (thrombolytics and/or thrombectomy)    Patient care was coordinated with spoke provider, including but not limted to    Discussing likely diagnosis/etiology of symptoms  Making recommendations for further diagnostic studies  Making recommendations for intravenous thrombolytics or other advanced therapies  Making recommendations for disposition (admission/transfer for higher level of care)

## 2024-08-10 NOTE — Clinical Note
Diagnosis: Acute focal neurological deficit [118341]   Future Attending Provider: ALMA SERNA [[id number]]   Reason for IP Medical Treatment  (Clinical interventions that can only be accomplished in the IP setting? ) :: CVA with TNKase administration

## 2024-08-11 PROBLEM — I69.314 FRONTAL LOBE AND EXECUTIVE FUNCTION DEFICIT FOLLOWING CEREBRAL INFARCTION: Status: ACTIVE | Noted: 2024-08-11

## 2024-08-11 LAB
ALBUMIN SERPL BCP-MCNC: 3.7 G/DL (ref 3.5–5.2)
ALP SERPL-CCNC: 63 U/L (ref 55–135)
ALT SERPL W/O P-5'-P-CCNC: 28 U/L (ref 10–44)
ANION GAP SERPL CALC-SCNC: 8 MMOL/L (ref 8–16)
APTT PPP: 22.8 SEC (ref 21–32)
AST SERPL-CCNC: 31 U/L (ref 10–40)
BASOPHILS # BLD AUTO: 0.02 K/UL (ref 0–0.2)
BASOPHILS NFR BLD: 0.2 % (ref 0–1.9)
BILIRUB SERPL-MCNC: 0.4 MG/DL (ref 0.1–1)
BUN SERPL-MCNC: 18 MG/DL (ref 6–20)
CALCIUM SERPL-MCNC: 8.8 MG/DL (ref 8.7–10.5)
CHLORIDE SERPL-SCNC: 107 MMOL/L (ref 95–110)
CO2 SERPL-SCNC: 22 MMOL/L (ref 23–29)
CREAT SERPL-MCNC: 0.9 MG/DL (ref 0.5–1.4)
DIFFERENTIAL METHOD BLD: ABNORMAL
EOSINOPHIL # BLD AUTO: 0.3 K/UL (ref 0–0.5)
EOSINOPHIL NFR BLD: 2.5 % (ref 0–8)
ERYTHROCYTE [DISTWIDTH] IN BLOOD BY AUTOMATED COUNT: 14.1 % (ref 11.5–14.5)
EST. GFR  (NO RACE VARIABLE): >60 ML/MIN/1.73 M^2
GLUCOSE SERPL-MCNC: 98 MG/DL (ref 70–110)
HCT VFR BLD AUTO: 41.2 % (ref 40–54)
HGB BLD-MCNC: 13.8 G/DL (ref 14–18)
IMM GRANULOCYTES # BLD AUTO: 0.05 K/UL (ref 0–0.04)
IMM GRANULOCYTES NFR BLD AUTO: 0.5 % (ref 0–0.5)
INR PPP: 1.1 (ref 0.8–1.2)
LYMPHOCYTES # BLD AUTO: 2 K/UL (ref 1–4.8)
LYMPHOCYTES NFR BLD: 20.4 % (ref 18–48)
MAGNESIUM SERPL-MCNC: 2.3 MG/DL (ref 1.6–2.6)
MCH RBC QN AUTO: 29.4 PG (ref 27–31)
MCHC RBC AUTO-ENTMCNC: 33.5 G/DL (ref 32–36)
MCV RBC AUTO: 88 FL (ref 82–98)
MONOCYTES # BLD AUTO: 0.7 K/UL (ref 0.3–1)
MONOCYTES NFR BLD: 7.5 % (ref 4–15)
NEUTROPHILS # BLD AUTO: 6.8 K/UL (ref 1.8–7.7)
NEUTROPHILS NFR BLD: 68.9 % (ref 38–73)
NRBC BLD-RTO: 0 /100 WBC
PHOSPHATE SERPL-MCNC: 3.5 MG/DL (ref 2.7–4.5)
PLATELET # BLD AUTO: 213 K/UL (ref 150–450)
PLATELET BLD QL SMEAR: ABNORMAL
PMV BLD AUTO: 9.8 FL (ref 9.2–12.9)
POTASSIUM SERPL-SCNC: 4.2 MMOL/L (ref 3.5–5.1)
PROT SERPL-MCNC: 6.8 G/DL (ref 6–8.4)
PROTHROMBIN TIME: 11.4 SEC (ref 9–12.5)
RBC # BLD AUTO: 4.7 M/UL (ref 4.6–6.2)
SODIUM SERPL-SCNC: 137 MMOL/L (ref 136–145)
TROPONIN I SERPL DL<=0.01 NG/ML-MCNC: <0.006 NG/ML (ref 0–0.03)
WBC # BLD AUTO: 9.89 K/UL (ref 3.9–12.7)

## 2024-08-11 PROCEDURE — 97162 PT EVAL MOD COMPLEX 30 MIN: CPT | Performed by: PHYSICAL THERAPIST

## 2024-08-11 PROCEDURE — 92610 EVALUATE SWALLOWING FUNCTION: CPT

## 2024-08-11 PROCEDURE — 97116 GAIT TRAINING THERAPY: CPT | Performed by: PHYSICAL THERAPIST

## 2024-08-11 PROCEDURE — 85730 THROMBOPLASTIN TIME PARTIAL: CPT

## 2024-08-11 PROCEDURE — 80053 COMPREHEN METABOLIC PANEL: CPT

## 2024-08-11 PROCEDURE — 84484 ASSAY OF TROPONIN QUANT: CPT

## 2024-08-11 PROCEDURE — 94761 N-INVAS EAR/PLS OXIMETRY MLT: CPT

## 2024-08-11 PROCEDURE — 36415 COLL VENOUS BLD VENIPUNCTURE: CPT

## 2024-08-11 PROCEDURE — 25000003 PHARM REV CODE 250: Performed by: FAMILY MEDICINE

## 2024-08-11 PROCEDURE — 25000003 PHARM REV CODE 250

## 2024-08-11 PROCEDURE — 84100 ASSAY OF PHOSPHORUS: CPT

## 2024-08-11 PROCEDURE — 83735 ASSAY OF MAGNESIUM: CPT

## 2024-08-11 PROCEDURE — 11000001 HC ACUTE MED/SURG PRIVATE ROOM

## 2024-08-11 PROCEDURE — 85610 PROTHROMBIN TIME: CPT

## 2024-08-11 PROCEDURE — 85025 COMPLETE CBC W/AUTO DIFF WBC: CPT

## 2024-08-11 PROCEDURE — 92523 SPEECH SOUND LANG COMPREHEN: CPT

## 2024-08-11 PROCEDURE — 97165 OT EVAL LOW COMPLEX 30 MIN: CPT

## 2024-08-11 PROCEDURE — 97535 SELF CARE MNGMENT TRAINING: CPT

## 2024-08-11 RX ORDER — NAPROXEN SODIUM 220 MG/1
81 TABLET, FILM COATED ORAL DAILY
Status: DISCONTINUED | OUTPATIENT
Start: 2024-08-12 | End: 2024-08-12 | Stop reason: HOSPADM

## 2024-08-11 RX ORDER — CLOPIDOGREL BISULFATE 75 MG/1
75 TABLET ORAL DAILY
Status: DISCONTINUED | OUTPATIENT
Start: 2024-08-12 | End: 2024-08-12 | Stop reason: HOSPADM

## 2024-08-11 RX ORDER — ATORVASTATIN CALCIUM 40 MG/1
80 TABLET, FILM COATED ORAL NIGHTLY
Status: DISCONTINUED | OUTPATIENT
Start: 2024-08-11 | End: 2024-08-12 | Stop reason: HOSPADM

## 2024-08-11 RX ADMIN — MUPIROCIN: 20 OINTMENT TOPICAL at 01:08

## 2024-08-11 RX ADMIN — MUPIROCIN: 20 OINTMENT TOPICAL at 09:08

## 2024-08-11 RX ADMIN — MUPIROCIN: 20 OINTMENT TOPICAL at 10:08

## 2024-08-11 RX ADMIN — ATORVASTATIN CALCIUM 80 MG: 40 TABLET, FILM COATED ORAL at 10:08

## 2024-08-11 NOTE — ASSESSMENT & PLAN NOTE
Acute infarct of L frontal lobe and insula suspected on CTA and confirmed on MRI.    Antithrombotics for secondary stroke prevention: Antiplatelets: None: Hold all Antithrombotics x 24 hours after IV Thrombolytic therapy administration    Statins for secondary stroke prevention and hyperlipidemia, if present:   Statins: Atorvastatin- 80 mg daily    Aggressive risk factor modification: None     Rehab efforts: The patient has been evaluated by a stroke team provider and the therapy needs have been fully considered based off the presenting complaints and exam findings. The following therapy evaluations are needed: PT evaluate and treat, OT evaluate and treat, SLP evaluate and treat, PM&R evaluate for appropriate placement    Diagnostics ordered/pending: CT scan of head without contrast to asses brain parenchyma, CTA Head to assess vasculature , CTA Neck/Arch to assess vasculature, HgbA1C to assess blood glucose levels, Lipid Profile to assess cholesterol levels, MRI head without contrast to assess brain parenchyma, TTE to assess cardiac function/status , TSH to assess thyroid function    VTE prophylaxis: Mechanical prophylaxis: Place SCDs  None: Reason for No Pharmacological VTE Prophylaxis: Holding x 24 hours s/p treatment with Thrombolytic therapy    BP parameters: Infarct: Post Thrombolytic therapy, SBP <180    -  Seen by PT/OT/ speech therapist

## 2024-08-11 NOTE — PT/OT/SLP EVAL
"Physical Therapy Evaluation    Patient Name:  Monster Crespo   MRN:  [id number]    Recommendations:     Discharge Recommendations:  (No Physical Therapy Indicated)   Discharge Equipment Recommendations: none   Barriers to discharge: None    Assessment:     Monster Crespo is a 54 y.o. male admitted with a medical diagnosis of Cerebrovascular accident (CVA) due to occlusion of left middle cerebral artery.  He presents with the following impairments/functional limitations: word finding difficulty noted.  See Speech Evaluation.    Rehab Prognosis: Good; patient would benefit from acute skilled PT services to address these deficits and reach maximum level of function.    Recent Surgery: * No surgery found *      Plan:     During this hospitalization, patient to be seen   to address the identified rehab impairments via   and progress toward the following goals:    Plan of Care Expires:       Subjective     Chief Complaint: I do have to go to the bathroom  Patient/Family Comments/goals: go to the bathroom  Pain/Comfort:  Pain Rating 1: 0/10    Patients cultural, spiritual, Sabianism conflicts given the current situation:      Living Environment:  Pt lives with his parents in a [facility] with 4 steps and bilateral railings  Prior to admission, patients level of function was Ind community ambulator, drives and works as a  and [employer].  Equipment used at home: none.  DME owned (not currently used): .  Upon discharge, patient will have assistance from family.    Objective:     Communicated with nurse prior to session.  Patient found HOB elevated with peripheral IV, telemetry, pulse ox (continuous)  upon PT entry to room.    General Precautions: Standard, fall, aphasia  Orthopedic Precautions:    Braces:    Respiratory Status: Room air    Exams:  Pt has BLE AROM WFL.  Pt has 5/5 BLE strength.  Pt was unable to initially state the month correct, stating "13" or the year correct, stating 1924.  Pt agreed the year was " 2024 upon giving him options.     Functional Mobility:  Pt moved supine to sit Ind.  Pt ambulated 20' x 1 and 90' x 1 Ind without LOB.  Pt able to stand on each leg longer than 6      AM-PAC 6 CLICK MOBILITY  Total Score:24       Treatment & Education:  Pt able to stand to use bathroom Independently, including washing hands.     Patient left up in chair with all lines intact, call button in reach, nurse notified, and family present.    GOALS:   Multidisciplinary Problems       Physical Therapy Goals       Not on file              Multidisciplinary Problems (Resolved)          Problem: Physical Therapy    Goal Priority Disciplines Outcome Goal Variances Interventions   Physical Therapy Goal   (Resolved)     PT, PT/OT Met     Description: Goals met                                  History:     Past Medical History:   Diagnosis Date    GERD (gastroesophageal reflux disease)     Insomnia        History reviewed. No pertinent surgical history.    Time Tracking:     PT Received On: 08/11/24  PT Start Time: 1309     PT Stop Time: 1332  PT Total Time (min): 23 min     Billable Minutes: Evaluation 15 and Gait Training 8      08/11/2024

## 2024-08-11 NOTE — PROGRESS NOTES
53 yo male w/ PMHx HL p/w acute onset of aphasia now diagnosed to be d/t acute L frontal and insular stroke.   S/p tPA in the ED.   Admitted to the ICU for overnight observation.     Statin initiated.   Antiplatelets 24hrs later after re imaging.     CTA head and neck negative for LVO.     Detailed H&P noted in the chart.   VTE prophy at all times.     Please callus for further assistance.

## 2024-08-11 NOTE — PT/OT/SLP EVAL
Occupational Therapy   Evaluation and Discharge Note    Name: Monster Crespo  MRN: [id number]  Admitting Diagnosis: Cerebrovascular accident (CVA) due to occlusion of left middle cerebral artery  Recent Surgery: * No surgery found *      Recommendations:     Discharge Recommendations: No Therapy Indicated  Discharge Equipment Recommendations: none  Barriers to discharge:  None    Assessment:     Monster Crespo is a 54 y.o. male with a medical diagnosis of Cerebrovascular accident (CVA) due to occlusion of left middle cerebral artery. At this time, patient is functioning at their prior level of function and does not require further acute OT services.  REC: pt get clearance from MD before return to work & driving 2/2 pt has significant expressive and receptive aphasia & comimunication is impaired as a result.   Dc acute OT at this time.  Plan:     During this hospitalization, patient does not require further acute OT services.  Please re-consult if situation changes.    Plan of Care Reviewed with: patient, family    Subjective     Chief Complaint: none  Patient/Family Comments/goals: pt saying wrong words to express himself, he did follow commands for self care but some receptive aphasia also noted.    Occupational Profile:  Living Environment: pt lives with his parents in [facility] with 4 steps and BHR; t/s combo. Son present and corrected pt  and had to answer questions for pt as he was incorrectly providing hx due to aphasia.  Previous level of function: Indep ADLs, ambulation, drives, works as  at [employer].  Roles and Routines: son, father, caretaker  Equipment Used at home: none  Assistance upon Discharge: son, family    Pain/Comfort:  Pain Rating 1: 0/10  Pain Rating Post-Intervention 1: 0/10    Patients cultural, spiritual, Lutheran conflicts given the current situation: no    Objective:     Communicated with: nurse prior to session.  Patient found HOB elevated with peripheral IV, blood  pressure cuff, pulse ox (continuous), telemetry upon OT entry to room.    General Precautions: Standard, fall, aphasia  Orthopedic Precautions: N/A  Braces: N/A  Respiratory Status: Room air     Occupational Performance:    Bed Mobility:    Patient completed Rolling/Turning to Left with  independence  Patient completed Scooting/Bridging with independence  Patient completed Supine to Sit with independence    Functional Mobility/Transfers:  Patient completed Sit <> Stand Transfer with independence  with  no assistive device   Patient completed Bed <> Chair Transfer using Step Transfer technique with independence with no assistive device  Patient completed Toilet Transfer Step Transfer technique with independence with  no AD  Functional Mobility: Indep Ambulation in room to bathroom, sink and bedside chair    Activities of Daily Living:  Feeding:  independence .  Grooming: independence standing at sink, brushed teeth , washed hansds  Upper Body Dressing: independence .  Lower Body Dressing: independence    Toileting: independence      Cognitive/Visual Perceptual:  Cognitive/Psychosocial Skills:     -       Oriented to: Person, Place, Time, Situation, and had to give choices for month 2/2 expressive aphasia    -       Follows Commands/attention:Follows one-step commands and Follows two-step commands  -       Communication: expressive aphasia and receptive aphasia  -       Memory: defer to ST to further assess  -       Safety awareness/insight to disability: intact   -       Mood/Affect/Coping skills/emotional control: Cooperative  Visual/Perceptual:      -Intact      Physical Exam:  Balance:    -       sitting: good  standing: good standing 6 sec leg stance; good  Postural examination/scapula alignment:    -       No postural abnormalities identified  Sensation:    -       Intact  Motor Planning:    -       intact  Dominant hand:    -       right  Upper Extremity Range of Motion:     -       Right Upper Extremity:  WNL  -       Left Upper Extremity: WNL  Upper Extremity Strength:    -       Right Upper Extremity: WNL  -       Left Upper Extremity: WNL   Strength:    -       Right Upper Extremity: WNL  -       Left Upper Extremity: WNL  Fine Motor Coordination:    -       Intact  Gross motor coordination:   WFL  Neurological:    -       normal tone    AMPAC 6 Click ADL:  AMPA Total Score: 24    Treatment & Education: pt and family educated in and they acknowledged:  Purpose of OT/ POC  Call don't fall 2/2 multile lines.  No skilled OT needs noted.  REC: pt get clearance from MD before return to work & driving 2/2 pt has signicant expressive and receptive aphasia & comimunication is impaired as a result.   Educated in [facility] signs and symptoms of stroke.   All questions/concerns addressed within scope.     Patient left up in chair with all lines intact, call button in reach, nurse notified, and mother, father, son and another family member present    GOALS:   Multidisciplinary Problems       Occupational Therapy Goals       Not on file                    History:     Past Medical History:   Diagnosis Date    GERD (gastroesophageal reflux disease)     Insomnia        History reviewed. No pertinent surgical history.    Time Tracking:     OT Date of Treatment: 08/11/24  OT Start Time: 1313  OT Stop Time: 1339  OT Total Time (min): 26 min cotx with PT    Billable Minutes:Evaluation 10  Self Care/Home Management 16  Total Time 26    8/11/2024

## 2024-08-11 NOTE — PT/OT/SLP EVAL
Speech Language Pathology Evaluation  Cognitive/Bedside Swallow    Patient Name:  Monster Crespo   MRN:  [id number]  Admitting Diagnosis: Cerebrovascular accident (CVA) due to occlusion of left middle cerebral artery    Recommendations:                  General Recommendations:  Speech/language therapy  Diet recommendations:  Regular Diet - IDDSI Level 7, Thin   Aspiration Precautions: 1 bite/sip at a time, Feed only when awake/alert, Frequent oral care, HOB to 90 degrees, Remain upright 30 minutes post meal, Small bites/sips, and Standard aspiration precautions   General Precautions: Standard, fall, aphasia  Communication strategies:  provide increased time to answer    Assessment:     Monster Crespo is a 54 y.o. male with an SLP diagnosis of Aphasia.  He presents with moderately impaired expressive and receptive language skills. Oropharyngeal skills found to be WFL this eval.     History:     Past Medical History:   Diagnosis Date    GERD (gastroesophageal reflux disease)     Insomnia        History reviewed. No pertinent surgical history.    Social History: Patient lives with mother at home.    Prior Intubation HX:  not indicated this admit    Modified Barium Swallow: not indicated this admit    Chest X-Rays: not indicated this admit    Prior diet: regular/thin.    .    Subjective     Patient presents with    Altered Mental Status       EMS called to scene for patient with hypotension. Reports patient understands English and [place] Ze[place]ander. Patient responsive but not speaking. Attempting to speak but unable to. Patient moving all extremities and following commands.          HPI: Monster Crespo is a 54M w/ PMHx of dyslipidemia who presented to [facility] ED via EMS for CC AMS. Per his son suddenly began having issues w/ speaking. LKW approx 1330 on 8/10/24. Since EMS arrival the patient has been alert and responsive, following commands but unable to speak. He answers questions with yes or no nod.    A code stroke was called  "and patient was evaluated by Vascular Neurology who recommended TNK administration which he accepted.   The patient remains unable to speak but nods and follows commands.    ED workup also significant for: CTA H/N shows suspected subtle loss of gray-white differentiation in left insular cortex which can be seen with acute infarct. Recommended MRI done, shows acute infarction in the left frontal lobe and insula.  Telestroke MD updated, awaiting further recommendations.   The patient is admitted to Hospital Medicine with neurology consult.     ST was referred for speech/swallow eval.     Patient goals: "to eat"     Pain/Comfort:  Pain Rating 1: 0/10    Respiratory Status: Room air    Objective:     Cognitive Status:    Orientation Person and Time   Pt stated he did not know place. Responded "yes" when asked if he was currently at home. For situation, responded, "I started acting crazy yesterday and the ambulance brought me here."      Receptive Language:   Comprehension:      Questions Simple yes/no 100%  Complex yes/no 20%  Open ended questions 75%  Commands  One step 100%  two step basic commands 50%  multistep basic commands 0%  Object identifications 5 in Fo 5  Conversation mild-mod delays in responses due to word retrieval impairment, as well as slowed processing time        Expressive Language:  Verbal:    Automatic Speech  Counting 100% and Days of the week 100%  Repetition Words 100%, Phrases 75%, and Sentences 75%  Naming Confrontation 70%, Divergent responsive 50%, and Single word responsive naming 70%  Conversational speech Frequent hesitations due to time needed for word retrieval        Motor Speech:  WFL    Voice:   WFL        Oral Musculature Evaluation  Oral Musculature: WFL  Dentition: present and adequate  Secretion Management: adequate  Mucosal Quality: good  Mandibular Strength and Mobility: WFL  Oral Labial Strength and Mobility: WFL  Lingual Strength and Mobility: WFL  Velar Elevation: " WFL  Buccal Strength and Mobility: WFL  Volitional Cough: WFL  Volitional Swallow: WFL  Voice Prior to PO Intake: clear vocal quality    Bedside Swallow Eval:   Consistencies Assessed:  Thin liquids approx 4 oz h2O via self regulated straw sips  Puree approx 3 oz pudding via self regulated tsp bites  Soft solids approx 3 oz diced peaches via self regulated tsp bites  Solids 1 manoj cracker via self regulated tsp bites      Oral Phase:   WFL    Pharyngeal Phase:   no overt clinical signs/symptoms of aspiration  no overt clinical signs/symptoms of pharyngeal dysphagia    Compensatory Strategies  None    Treatment: Clinician provided bedside swallow study and portions of the Western Aphasia Battery. Pt is bilingual Guamanian/English. Declined . When told he could respond in either Guamanian or English, responded only in English.     Goals:   Multidisciplinary Problems       SLP Goals          Problem: SLP    Goal Priority Disciplines Outcome   SLP Goal     SLP Progressing   Description: 1. Given mod assist, pt will perform confrontational naming task at 90% accuracy.  2. Given mod assist, pt will follow 2-step verbal directions at 80% accuracy.  3. Given mod assist, pt will complete concrete divergent and convergent naming tasks at 80% accuracy.  4. Given mod assist, pt will respond to simple information questions at 90% accuracy.                         Plan:     Patient to be seen:  3 x/week   Plan of Care expires:  09/11/24  Plan of Care reviewed with:  patient   SLP Follow-Up:  Yes       Discharge recommendations:      Barriers to Discharge:  None    Time Tracking:     SLP Treatment Date:   08/11/24  Speech Start Time:  0910  Speech Stop Time:  0935     Speech Total Time (min):  25 min    Billable Minutes: Eval 17  and Eval Swallow and Oral Function 8    08/11/2024

## 2024-08-11 NOTE — H&P
[facility] Medicine  History & Physical    Patient Name: Monster Crespo  MRN: [id number]  Patient Class: IP- Inpatient  Admission Date: 8/10/2024  Attending Physician: Kaur Latif*   Primary Care Provider: James Dawson MD         Patient information was obtained from patient, past medical records, and ER records.     Subjective:     Principal Problem:Cerebrovascular accident (CVA) due to occlusion of left middle cerebral artery    Chief Complaint:   Chief Complaint   Patient presents with    Altered Mental Status     EMS called to scene for patient with hypotension. Reports patient understands English and [place]. Patient responsive but not speaking. Attempting to speak but unable to. Patient moving all extremities and following commands.         HPI: Monster Crespo is a 54M w/ PMHx of dyslipidemia who presented to [facility] ED via EMS for CC AMS. Per his son suddenly began having issues w/ speaking. LKW approx 1330 on 8/10/24. Since EMS arrival the patient has been alert and responsive, following commands but unable to speak. He answers questions with yes or no nod.    A code stroke was called and patient was evaluated by Vascular Neurology who recommended TNK administration which he accepted.   The patient remains unable to speak but nods and follows commands.    ED workup also significant for: CTA H/N shows suspected subtle loss of gray-white differentiation in left insular cortex which can be seen with acute infarct. Recommended MRI done, shows acute infarction in the left frontal lobe and insula.  Telestroke MD updated, awaiting further recommendations.   The patient is admitted to Hospital Medicine with neurology consult.     Past Medical History:   Diagnosis Date    GERD (gastroesophageal reflux disease)     Insomnia        History reviewed. No pertinent surgical history.    Review of patient's allergies indicates:  No Known Allergies    No current  facility-administered medications on file prior to encounter.     Current Outpatient Medications on File Prior to Encounter   Medication Sig    atorvastatin (LIPITOR) 40 MG tablet Take 1 tablet (40 mg total) by mouth every evening.    doxepin (SINEQUAN) 100 MG capsule TAKE 1 CAPSULE BY MOUTH EVERY DAY IN THE EVENING    EScitalopram oxalate (LEXAPRO) 10 MG tablet TAKE 1 TABLET BY MOUTH EVERY DAY    pantoprazole (PROTONIX) 40 MG tablet TAKE 1 TABLET BY MOUTH EVERY DAY BEFORE BREAKFAST     Family History       Problem Relation (Age of Onset)    Hypertension Mother          Tobacco Use    Smoking status: Never    Smokeless tobacco: Never   Substance and Sexual Activity    Alcohol use: Yes     Comment: 1 a month    Drug use: Not on file    Sexual activity: Not on file     Review of Systems   Unable to perform ROS: Acuity of condition     Objective:     Vital Signs (Most Recent):  Temp: 98.6 °F (37 °C) (08/10/24 2352)  Pulse: 65 (08/10/24 2352)  Resp: (!) 23 (08/10/24 2352)  BP: 137/74 (08/10/24 2352)  SpO2: 99 % (08/10/24 2352) Vital Signs (24h Range):  Temp:  [97.8 °F (36.6 °C)-98.6 °F (37 °C)] 98.6 °F (37 °C)  Pulse:  [60-78] 65  Resp:  [12-33] 23  SpO2:  [95 %-100 %] 99 %  BP: (106-137)/(65-80) 137/74     Weight: 75 kg (165 lb 5.5 oz)  Body mass index is 25.9 kg/m².     Physical Exam  Vitals and nursing note reviewed.   Constitutional:       General: He is not in acute distress.     Appearance: He is not ill-appearing.   HENT:      Head: Normocephalic and atraumatic.      Nose: Nose normal.      Mouth/Throat:      Mouth: Mucous membranes are moist.      Pharynx: Oropharynx is clear.   Eyes:      Extraocular Movements: Extraocular movements intact.      Conjunctiva/sclera: Conjunctivae normal.      Pupils: Pupils are equal, round, and reactive to light.   Cardiovascular:      Rate and Rhythm: Normal rate and regular rhythm.      Pulses: Normal pulses.      Heart sounds: Normal heart sounds. No murmur heard.     No  friction rub. No gallop.   Pulmonary:      Effort: Pulmonary effort is normal. No respiratory distress.      Breath sounds: Normal breath sounds. No wheezing, rhonchi or rales.   Abdominal:      General: Bowel sounds are normal. There is no distension.      Palpations: Abdomen is soft.      Tenderness: There is no abdominal tenderness. There is no guarding or rebound.   Genitourinary:     Comments: deferred  Musculoskeletal:         General: Normal range of motion.      Cervical back: Normal range of motion and neck supple.      Right lower leg: No edema.      Left lower leg: No edema.   Skin:     General: Skin is warm and dry.      Capillary Refill: Capillary refill takes less than 2 seconds.   Neurological:      Mental Status: He is alert.      GCS: GCS eye subscore is 4. GCS verbal subscore is 1. GCS motor subscore is 6.      Cranial Nerves: Dysarthria present.      Sensory: Sensation is intact.      Motor: Motor function is intact.      Coordination: Coordination is intact.   Psychiatric:      Comments: AARON              CRANIAL NERVES     CN III, IV, VI   Pupils are equal, round, and reactive to light.       Significant Labs: All pertinent labs within the past 24 hours have been reviewed.  Recent Lab Results  (Last 5 results in the past 24 hours)        08/10/24  1558   08/10/24  1513   08/10/24  1511   08/10/24  1459   08/10/24  1454        Albumin 3.6               ALP 66               ALT 26               Anion Gap 8               PTT 26.2  Comment: Refer to local heparin nomogram for intensity/dose specific   therapeutic   range.  LOT^050^APTT [id number]                 AST 23               Baso #       0.03         Basophil %       0.4         BILIRUBIN TOTAL 0.2  Comment: For infants and newborns, interpretation of results should be based  on gestational age, weight and in agreement with clinical  observations.    Premature Infant recommended reference ranges:  Up to 24 hours.............<8.0 mg/dL  Up to  48 hours............<12.0 mg/dL  3-5 days..................<15.0 mg/dL  6-29 days.................<15.0 mg/dL                 Site   Other   Other           BUN 24               Calcium 8.9               Chloride 107               Cholesterol Total 220  Comment: The National Cholesterol Education Program (NCEP) has set the  following guidelines (reference ranges) for Cholesterol:  Optimal.....................<200 mg/dL  Borderline High.............200-239 mg/dL  High........................> or = 240 mg/dL                 CO2 24               Creatinine 1.0               DelSys   Room Air   Room Air           Differential Method       Automated         eGFR >60               Eos #       0.3         Eos %       4.5         Estimated Avg Glucose 114               Glucose 95               Gran # (ANC)       4.0         Gran %       58.3         HDL 33  Comment: The National Cholesterol Education Program (NCEP) has set the  following guidelines (reference values) for HDL Cholesterol:  Low...............<40 mg/dL  Optimal...........>60 mg/dL                 HDL/Cholesterol Ratio 15.0               Hematocrit       41.1         Hemoglobin       13.8         Hemoglobin A1C External 5.6  Comment: ADA Screening Guidelines:  5.7-6.4%  Consistent with prediabetes  >or=6.5%  Consistent with diabetes    High levels of fetal hemoglobin interfere with the HbA1C  assay. Heterozygous hemoglobin variants (HbS, HgC, etc)do  not significantly interfere with this assay.   However, presence of multiple variants may affect accuracy.                 Immature Grans (Abs)       0.03  Comment: Mild elevation in immature granulocytes is non specific and   can be seen in a variety of conditions including stress response,   acute inflammation, trauma and pregnancy. Correlation with other   laboratory and clinical findings is essential.           Immature Granulocytes       0.4         LDL Cholesterol 133.0  Comment: The National Cholesterol  Education Program (NCEP) has set the  following guidelines (reference values) for LDL Cholesterol:  Optimal.......................<130 mg/dL  Borderline High...............130-159 mg/dL  High..........................160-189 mg/dL  Very High.....................>190 mg/dL                 Lymph #       2.0         Lymph %       29.5         MCH       29.4         MCHC       33.6         MCV       88         Mono #       0.5         Mono %       6.9         MPV       11.8         Non-HDL Cholesterol 187  Comment: Risk category and Non-HDL cholesterol goals:  Coronary heart disease (CHD)or equivalent (10-year risk of CHD >20%):  Non-HDL cholesterol goal     <130 mg/dL  Two or more CHD risk factors and 10-year risk of CHD <= 20%:  Non-HDL cholesterol goal     <160 mg/dL  0 to 1 CHD risk factor:  Non-HDL cholesterol goal     <190 mg/dL                 nRBC       0         Platelet Count       267         POC Creatinine   1.1             POC PTINR     1.2           POCT Glucose         132       Potassium 4.2               PROTEIN TOTAL 6.8               RBC       4.69         RDW       14.2         Sample   VENOUS   VENOUS           Sodium 139               Total Cholesterol/HDL Ratio 6.7               Triglycerides 270  Comment: The National Cholesterol Education Program (NCEP) has set the  following guidelines (reference values) for triglycerides:  Normal......................<150 mg/dL  Borderline High.............150-199 mg/dL  High........................200-499 mg/dL                 TSH 2.195               WBC       6.91                                Significant Imaging: I have reviewed all pertinent imaging results/findings within the past 24 hours.  Assessment/Plan:     * Cerebrovascular accident (CVA) due to occlusion of left middle cerebral artery  Acute infarct of L frontal lobe and insula suspected on CTA and confirmed on MRI.    Antithrombotics for secondary stroke prevention: Antiplatelets: None: Hold all  Antithrombotics x 24 hours after IV Thrombolytic therapy administration    Statins for secondary stroke prevention and hyperlipidemia, if present:   Statins: Atorvastatin- 80 mg daily    Aggressive risk factor modification: None     Rehab efforts: The patient has been evaluated by a stroke team provider and the therapy needs have been fully considered based off the presenting complaints and exam findings. The following therapy evaluations are needed: PT evaluate and treat, OT evaluate and treat, SLP evaluate and treat, PM&R evaluate for appropriate placement    Diagnostics ordered/pending: CT scan of head without contrast to asses brain parenchyma, CTA Head to assess vasculature , CTA Neck/Arch to assess vasculature, HgbA1C to assess blood glucose levels, Lipid Profile to assess cholesterol levels, MRI head without contrast to assess brain parenchyma, TTE to assess cardiac function/status , TSH to assess thyroid function    VTE prophylaxis: Mechanical prophylaxis: Place SCDs  None: Reason for No Pharmacological VTE Prophylaxis: Holding x 24 hours s/p treatment with Thrombolytic therapy    BP parameters: Infarct: Post Thrombolytic therapy, SBP <180        Dyslipidemia  High dose statin started        VTE Risk Mitigation (From admission, onward)           Ordered     IP VTE HIGH RISK PATIENT  Once         08/10/24 1834     Place sequential compression device  Until discontinued         08/10/24 1834     Reason for No Pharmacological VTE Prophylaxis  Once        Question:  Reasons:  Answer:  Risk of Bleeding  Comment:  s/p TNK    08/10/24 1834                  Critical care time spent on the evaluation and treatment of severe organ dysfunction, review of pertinent labs and imaging studies, discussions with consulting providers and discussions with patient/family: 37 minutes.                  Ida Cordero, Nadya, AGACNP-C  Department of [facility] Medicine  [facility]

## 2024-08-11 NOTE — HPI
Monster Crespo is a 54M w/ PMHx of dyslipidemia who presented to [facility] ED via EMS for CC AMS. Per his son suddenly began having issues w/ speaking. LKW approx 1330 on 8/10/24. Since EMS arrival the patient has been alert and responsive, following commands but unable to speak. He answers questions with yes or no nod.    A code stroke was called and patient was evaluated by Vascular Neurology who recommended TNK administration which he accepted.   The patient remains unable to speak but nods and follows commands.    ED workup also significant for: CTA H/N shows suspected subtle loss of gray-white differentiation in left insular cortex which can be seen with acute infarct. Recommended MRI done, shows acute infarction in the left frontal lobe and insula.  Telestroke MD updated, awaiting further recommendations.   The patient is admitted to Hospital Medicine with neurology consult.

## 2024-08-11 NOTE — PROGRESS NOTES
[facility] Medicine  Progress Note    Patient Name: Monster Crespo  MRN: [id number]  Patient Class: IP- Inpatient   Admission Date: 8/10/2024  Length of Stay: 1 days  Attending Physician: Ben Monson MD  Primary Care Provider: James Dawson MD        Subjective:     Principal Problem:Cerebrovascular accident (CVA) due to occlusion of left middle cerebral artery        HPI:  Monster Crespo is a 54M w/ PMHx of dyslipidemia who presented to [facility] ED via EMS for CC AMS. Per his son suddenly began having issues w/ speaking. LKW approx 1330 on 8/10/24. Since EMS arrival the patient has been alert and responsive, following commands but unable to speak. He answers questions with yes or no nod.    A code stroke was called and patient was evaluated by Vascular Neurology who recommended TNK administration which he accepted.   The patient remains unable to speak but nods and follows commands.    ED workup also significant for: CTA H/N shows suspected subtle loss of gray-white differentiation in left insular cortex which can be seen with acute infarct. Recommended MRI done, shows acute infarction in the left frontal lobe and insula.  Telestroke MD updated, awaiting further recommendations.   The patient is admitted to Hospital Medicine with neurology consult.     Overview/Hospital Course:  No notes on file    Interval History:  patient was examined in his bed.  He was calm and not in distress.  He denies chest pain, palpitation, shortness for breath, GI/ symptoms.  He states he feels his speech is gradually recovering.    Review of Systems   Constitutional:  Negative for activity change, chills and fever.   HENT:  Negative for congestion, rhinorrhea and sore throat.    Eyes:  Negative for discharge and redness.   Respiratory:  Negative for cough, chest tightness, shortness of breath and wheezing.    Cardiovascular:  Negative for chest pain, palpitations and leg swelling.    Gastrointestinal:  Negative for abdominal pain, constipation, diarrhea, nausea and vomiting.   Genitourinary:  Negative for dysuria, flank pain and hematuria.   Musculoskeletal:  Negative for back pain, myalgias and neck pain.   Skin:  Negative for pallor, rash and wound.   Neurological:  Positive for speech difficulty. Negative for dizziness, tremors, syncope, weakness, numbness and headaches.   Psychiatric/Behavioral:  Negative for agitation, confusion and hallucinations.      Objective:     Vital Signs (Most Recent):  Temp: 98.7 °F (37.1 °C) (08/11/24 1115)  Pulse: 78 (08/11/24 1300)  Resp: 20 (08/11/24 1300)  BP: 130/76 (08/11/24 1300)  SpO2: 100 % (08/11/24 1300) Vital Signs (24h Range):  Temp:  [97.8 °F (36.6 °C)-98.7 °F (37.1 °C)] 98.7 °F (37.1 °C)  Pulse:  [53-78] 78  Resp:  [11-33] 20  SpO2:  [94 %-100 %] 100 %  BP: (106-151)/(65-91) 130/76     Weight: 75 kg (165 lb 5.5 oz)  Body mass index is 25.9 kg/m².    Intake/Output Summary (Last 24 hours) at 8/11/2024 1412  Last data filed at 8/11/2024 1136  Gross per 24 hour   Intake 240 ml   Output 1225 ml   Net -985 ml         Physical Exam  Vitals and nursing note reviewed.   Constitutional:       General: He is not in acute distress.     Appearance: He is not ill-appearing.   HENT:      Head: Normocephalic and atraumatic.      Nose: Nose normal.      Mouth/Throat:      Mouth: Mucous membranes are moist.      Pharynx: Oropharynx is clear.   Eyes:      General:         Right eye: No discharge.         Left eye: No discharge.      Extraocular Movements: Extraocular movements intact.      Conjunctiva/sclera: Conjunctivae normal.      Pupils: Pupils are equal, round, and reactive to light.   Cardiovascular:      Rate and Rhythm: Normal rate and regular rhythm.      Pulses: Normal pulses.      Heart sounds: Normal heart sounds. No murmur heard.     No friction rub. No gallop.   Pulmonary:      Effort: Pulmonary effort is normal. No respiratory distress.      Breath  sounds: Normal breath sounds. No wheezing, rhonchi or rales.   Abdominal:      General: Bowel sounds are normal. There is no distension.      Palpations: Abdomen is soft.      Tenderness: There is no abdominal tenderness. There is no guarding or rebound.   Genitourinary:     Comments: deferred  Musculoskeletal:         General: No swelling. Normal range of motion.      Cervical back: Normal range of motion and neck supple.      Right lower leg: No edema.      Left lower leg: No edema.   Skin:     General: Skin is warm and dry.      Capillary Refill: Capillary refill takes less than 2 seconds.   Neurological:      Mental Status: He is alert and oriented to person, place, and time. Mental status is at baseline.      GCS: GCS eye subscore is 4. GCS verbal subscore is 1. GCS motor subscore is 6.      Cranial Nerves: Dysarthria present.      Sensory: Sensation is intact.      Motor: Motor function is intact.      Coordination: Coordination is intact.   Psychiatric:         Mood and Affect: Mood normal.      Comments: AARON             Significant Labs: All pertinent labs within the past 24 hours have been reviewed.    Significant Imaging: I have reviewed all pertinent imaging results/findings within the past 24 hours.    Assessment/Plan:      * Cerebrovascular accident (CVA) due to occlusion of left middle cerebral artery  Acute infarct of L frontal lobe and insula suspected on CTA and confirmed on MRI.    Antithrombotics for secondary stroke prevention: Antiplatelets: None: Hold all Antithrombotics x 24 hours after IV Thrombolytic therapy administration    Statins for secondary stroke prevention and hyperlipidemia, if present:   Statins: Atorvastatin- 80 mg daily    Aggressive risk factor modification: None     Rehab efforts: The patient has been evaluated by a stroke team provider and the therapy needs have been fully considered based off the presenting complaints and exam findings. The following therapy evaluations are  needed: PT evaluate and treat, OT evaluate and treat, SLP evaluate and treat, PM&R evaluate for appropriate placement    Diagnostics ordered/pending: CT scan of head without contrast to asses brain parenchyma, CTA Head to assess vasculature , CTA Neck/Arch to assess vasculature, HgbA1C to assess blood glucose levels, Lipid Profile to assess cholesterol levels, MRI head without contrast to assess brain parenchyma, TTE to assess cardiac function/status , TSH to assess thyroid function    VTE prophylaxis: Mechanical prophylaxis: Place SCDs  None: Reason for No Pharmacological VTE Prophylaxis: Holding x 24 hours s/p treatment with Thrombolytic therapy    BP parameters: Infarct: Post Thrombolytic therapy, SBP <180    -  Seen by PT/OT/ speech therapist    Dyslipidemia  C/w High dose statin         VTE Risk Mitigation (From admission, onward)           Ordered     IP VTE HIGH RISK PATIENT  Once         08/10/24 1834     Place sequential compression device  Until discontinued         08/10/24 1834     Reason for No Pharmacological VTE Prophylaxis  Once        Question:  Reasons:  Answer:  Risk of Bleeding  Comment:  s/p TNK    08/10/24 1834                    Discharge Planning   RENZO:      Code Status: Full Code   Is the patient medically ready for discharge?:     Reason for patient still in hospital (select all that apply): Treatment, Imaging, and PT / OT recommendations               Critical care time spent on the evaluation and treatment of severe organ dysfunction, review of pertinent labs and imaging studies, discussions with consulting providers and discussions with patient/family: 34 minutes.      Ben Monson MD  Department of Hospital Medicine   [facility] Intensive Care

## 2024-08-11 NOTE — CONSULTS
[facility] - Intensive Care  Critical Care Medicine  Consult Note    Patient Name: Monster Crespo  MRN: [id number]  Admission Date: 8/10/2024  Hospital Length of Stay: 1 days  Code Status: Full Code  Attending Physician: Ben Monson MD   Primary Care Provider: James Dawson MD   Principal Problem: Cerebrovascular accident (CVA) due to occlusion of left middle cerebral artery    Consults  Subjective:     HPI:  Patient is a 55 yo man with hx of HLD who came in to [facility] ED bib EMS with concerns for altered mentation. Per documentation patient started having symptoms around 1330 8/10 and on arrival stroke activated CT Head concerning for left GW differentiation loss in L insular cortex and frontal areas. Telestroke evaluated and ordered MRI brain confirmed findings of acute infarction within that region in addition to chronic microvascular changes. Received TNK at 1530 yesterday. CTA negative for LVO. Main symptom was dysarthria and facial droop. Doing better today.    Brought to the ICU for monitoring post TNK    Hospital/ICU Course:  No notes on file    Past Medical History:   Diagnosis Date    GERD (gastroesophageal reflux disease)     Insomnia        History reviewed. No pertinent surgical history.    Review of patient's allergies indicates:  No Known Allergies    Family History       Problem Relation (Age of Onset)    Hypertension Mother          Tobacco Use    Smoking status: Never    Smokeless tobacco: Never   Substance and Sexual Activity    Alcohol use: Yes     Comment: 1 a month    Drug use: Not on file    Sexual activity: Not on file      Review of Systems   All other systems reviewed and are negative.    Objective:     Vital Signs (Most Recent):  Temp: 98.9 °F (37.2 °C) (08/11/24 1515)  Pulse: 71 (08/11/24 1600)  Resp: 20 (08/11/24 1600)  BP: 124/70 (08/11/24 1600)  SpO2: 96 % (08/11/24 1600) Vital Signs (24h Range):  Temp:  [98 °F (36.7 °C)-98.9 °F (37.2 °C)] 98.9 °F (37.2 °C)  Pulse:  [53-84]  71  Resp:  [11-43] 20  SpO2:  [94 %-100 %] 96 %  BP: (115-151)/(65-91) 124/70   Weight: 75 kg (165 lb 5.5 oz)  Body mass index is 25.9 kg/m².      Intake/Output Summary (Last 24 hours) at 8/11/2024 1621  Last data filed at 8/11/2024 1136  Gross per 24 hour   Intake 240 ml   Output 1225 ml   Net -985 ml          Physical Exam  Vitals and nursing note reviewed.   Constitutional:       General: He is not in acute distress.     Appearance: He is not ill-appearing.   HENT:      Head: Normocephalic and atraumatic.      Nose: Nose normal.      Mouth/Throat:      Mouth: Mucous membranes are moist.      Pharynx: Oropharynx is clear.   Eyes:      General:         Right eye: No discharge.         Left eye: No discharge.      Extraocular Movements: Extraocular movements intact.      Conjunctiva/sclera: Conjunctivae normal.      Pupils: Pupils are equal, round, and reactive to light.   Cardiovascular:      Rate and Rhythm: Normal rate and regular rhythm.      Pulses: Normal pulses.      Heart sounds: Normal heart sounds. No murmur heard.     No friction rub. No gallop.   Pulmonary:      Effort: Pulmonary effort is normal. No respiratory distress.      Breath sounds: Normal breath sounds. No wheezing, rhonchi or rales.   Abdominal:      General: Bowel sounds are normal. There is no distension.      Palpations: Abdomen is soft.      Tenderness: There is no abdominal tenderness. There is no guarding or rebound.   Musculoskeletal:         General: No swelling. Normal range of motion.      Cervical back: Normal range of motion and neck supple.      Right lower leg: No edema.      Left lower leg: No edema.   Skin:     General: Skin is warm and dry.      Capillary Refill: Capillary refill takes less than 2 seconds.   Neurological:      Mental Status: He is alert and oriented to person, place, and time. Mental status is at baseline.      Cranial Nerves: Dysarthria present.      Sensory: Sensation is intact.      Motor: Motor function is  "intact.            Vents:     Lines/Drains/Airways       Peripheral Intravenous Line  Duration                  Peripheral IV - Single Lumen 08/10/24 1514 18 G Left Antecubital 1 day         Peripheral IV - Single Lumen 08/10/24 18 G Right Antecubital 1 day                  Significant Labs:    CBC/Anemia Profile:  Recent Labs   Lab 08/10/24  1459 08/11/24  0414   WBC 6.91 9.89   HGB 13.8* 13.8*   HCT 41.1 41.2    213   MCV 88 88   RDW 14.2 14.1        Chemistries:  Recent Labs   Lab 08/10/24  1558 08/11/24  0414    137   K 4.2 4.2    107   CO2 24 22*   BUN 24* 18   CREATININE 1.0 0.9   CALCIUM 8.9 8.8   ALBUMIN 3.6 3.7   PROT 6.8 6.8   BILITOT 0.2 0.4   ALKPHOS 66 63   ALT 26 28   AST 23 31   MG  --  2.3   PHOS  --  3.5       All pertinent labs within the past 24 hours have been reviewed.    Significant Imaging: I have reviewed all pertinent imaging results/findings within the past 24 hours.    ABG  No results for input(s): "PH", "PO2", "PCO2", "HCO3", "BE" in the last 168 hours.  Assessment/Plan:     Other  Left Frontal Lobe and insula infarct  Last known well 8/10 12:30, received TNK in the ED at 15:30 8/10. Doing better this morning, neuro exam stable. CTA w/o LVO, MRI brain confirmed acute infarction in the frontal lobe and insular areas, deficits mostly related to speech.  - Vascular neuro consulted  - Start antiplatelet and high-intensity statin therapy after 24h  - Repeat CT head w/o contrast at 24h to r/o hemorrhagic conversion  - SBP goal<180   - Blood sugar goal 140-180.   - Neuro check per ICU protocol  - SLP/PT/OT consults  - Check lipid panel and A1c.  - Can be stepped down after CT head if no acute changes.         Critical Care Daily Checklist:    A: Awake: RASS Goal/Actual Goal:    Actual:     B: Spontaneous Breathing Trial Performed?     C: SAT & SBT Coordinated?  N/a                      D: Delirium: CAM-ICU Overall CAM-ICU: Negative   E: Early Mobility Performed? Yes   F: " Feeding Goal:    Status:     Current Diet Order   Procedures    Diet Adult Regular Low Chol/Sat Fat     Order Specific Question:   Additional Diet Options:     Answer:   Low Chol/Sat Fat      AS: Analgesia/Sedation N/a   T: Thromboembolic Prophylaxis Holding after TNK   H: HOB > 300 Yes   U: Stress Ulcer Prophylaxis (if needed) N/A   G: Glucose Control 140-180 Goal   B: Bowel Function Stool Occurrence: 0   I: Indwelling Catheter ([employer]) Necessity PIV   D: De-escalation of Antimicrobials/Pharmacotherapies N/A    Plan for the day/ETD CT head post tnk at 24h    Code Status:  Family/Goals of Care: Full Code         Critical secondary to Patient has an abrupt change in neurologic status: CVA L frontal lobe s/p TNK     Critical care was time spent personally by me on the following activities: development of treatment plan with patient or surrogate and bedside caregivers, discussions with consultants, evaluation of patient's response to treatment, examination of patient, ordering and performing treatments and interventions, ordering and review of laboratory studies, ordering and review of radiographic studies, pulse oximetry, re-evaluation of patient's condition. This critical care time did not overlap with that of any other provider or involve time for any procedures.    Thank you for your consult. I will sign off. Please contact us if you have any additional questions.     Mack Baires MD  Critical Care Medicine  [facility]ensive [facility]

## 2024-08-11 NOTE — SUBJECTIVE & OBJECTIVE
Interval History:  patient was examined in his bed.  He was calm and not in distress.  He denies chest pain, palpitation, shortness for breath, GI/ symptoms.  He states he feels his speech is gradually recovering.    Review of Systems   Constitutional:  Negative for activity change, chills and fever.   HENT:  Negative for congestion, rhinorrhea and sore throat.    Eyes:  Negative for discharge and redness.   Respiratory:  Negative for cough, chest tightness, shortness of breath and wheezing.    Cardiovascular:  Negative for chest pain, palpitations and leg swelling.   Gastrointestinal:  Negative for abdominal pain, constipation, diarrhea, nausea and vomiting.   Genitourinary:  Negative for dysuria, flank pain and hematuria.   Musculoskeletal:  Negative for back pain, myalgias and neck pain.   Skin:  Negative for pallor, rash and wound.   Neurological:  Positive for speech difficulty. Negative for dizziness, tremors, syncope, weakness, numbness and headaches.   Psychiatric/Behavioral:  Negative for agitation, confusion and hallucinations.      Objective:     Vital Signs (Most Recent):  Temp: 98.7 °F (37.1 °C) (08/11/24 1115)  Pulse: 78 (08/11/24 1300)  Resp: 20 (08/11/24 1300)  BP: 130/76 (08/11/24 1300)  SpO2: 100 % (08/11/24 1300) Vital Signs (24h Range):  Temp:  [97.8 °F (36.6 °C)-98.7 °F (37.1 °C)] 98.7 °F (37.1 °C)  Pulse:  [53-78] 78  Resp:  [11-33] 20  SpO2:  [94 %-100 %] 100 %  BP: (106-151)/(65-91) 130/76     Weight: 75 kg (165 lb 5.5 oz)  Body mass index is 25.9 kg/m².    Intake/Output Summary (Last 24 hours) at 8/11/2024 1412  Last data filed at 8/11/2024 1136  Gross per 24 hour   Intake 240 ml   Output 1225 ml   Net -985 ml         Physical Exam  Vitals and nursing note reviewed.   Constitutional:       General: He is not in acute distress.     Appearance: He is not ill-appearing.   HENT:      Head: Normocephalic and atraumatic.      Nose: Nose normal.      Mouth/Throat:      Mouth: Mucous membranes are  moist.      Pharynx: Oropharynx is clear.   Eyes:      General:         Right eye: No discharge.         Left eye: No discharge.      Extraocular Movements: Extraocular movements intact.      Conjunctiva/sclera: Conjunctivae normal.      Pupils: Pupils are equal, round, and reactive to light.   Cardiovascular:      Rate and Rhythm: Normal rate and regular rhythm.      Pulses: Normal pulses.      Heart sounds: Normal heart sounds. No murmur heard.     No friction rub. No gallop.   Pulmonary:      Effort: Pulmonary effort is normal. No respiratory distress.      Breath sounds: Normal breath sounds. No wheezing, rhonchi or rales.   Abdominal:      General: Bowel sounds are normal. There is no distension.      Palpations: Abdomen is soft.      Tenderness: There is no abdominal tenderness. There is no guarding or rebound.   Genitourinary:     Comments: deferred  Musculoskeletal:         General: No swelling. Normal range of motion.      Cervical back: Normal range of motion and neck supple.      Right lower leg: No edema.      Left lower leg: No edema.   Skin:     General: Skin is warm and dry.      Capillary Refill: Capillary refill takes less than 2 seconds.   Neurological:      Mental Status: He is alert and oriented to person, place, and time. Mental status is at baseline.      GCS: GCS eye subscore is 4. GCS verbal subscore is 1. GCS motor subscore is 6.      Cranial Nerves: Dysarthria present.      Sensory: Sensation is intact.      Motor: Motor function is intact.      Coordination: Coordination is intact.   Psychiatric:         Mood and Affect: Mood normal.      Comments: AARON             Significant Labs: All pertinent labs within the past 24 hours have been reviewed.    Significant Imaging: I have reviewed all pertinent imaging results/findings within the past 24 hours.

## 2024-08-11 NOTE — ASSESSMENT & PLAN NOTE
Last known well 8/10 12:30, received TNK in the ED at 15:30 8/10. Doing better this morning, neuro exam stable. CTA w/o LVO, MRI brain confirmed acute infarction in the frontal lobe and insular areas, deficits mostly related to speech.  - Vascular neuro consulted  - Start antiplatelet and high-intensity statin therapy after 24h  - Repeat CT head w/o contrast at 24h to r/o hemorrhagic conversion  - SBP goal<180   - Blood sugar goal 140-180.   - Neuro check per ICU protocol  - SLP/PT/OT consults  - Check lipid panel and A1c.  - Can be stepped down after CT head if no acute changes.

## 2024-08-11 NOTE — PLAN OF CARE
POC discussed with patient at bedside, all questions and concerns answered, patient verbalized understanding.     Patients VSS throughout day shift. Patients L sided facial droop has greatly improved, patient still has expressive aphasia and struggles with what year it currently is. Patient has been ambulatory in room with standby assist. Patient has had good UOP but no BM. For further assessment please see MAR and Flowsheets.     Problem: Adult Inpatient Plan of Care  Goal: Plan of Care Review  Outcome: Progressing  Goal: Patient-Specific Goal (Individualized)  Outcome: Progressing  Goal: Absence of Hospital-Acquired Illness or Injury  Outcome: Progressing  Goal: Optimal Comfort and Wellbeing  Outcome: Progressing  Goal: Readiness for Transition of Care  Outcome: Progressing     Problem: Stroke, Ischemic (Includes Transient Ischemic Attack)  Goal: Optimal Coping  Outcome: Progressing  Goal: Effective Bowel Elimination  Outcome: Progressing  Goal: Optimal Cerebral Tissue Perfusion  Outcome: Progressing  Goal: Optimal Cognitive Function  Outcome: Progressing  Goal: Improved Communication Skills  Outcome: Progressing  Goal: Optimal Functional Ability  Outcome: Progressing  Goal: Optimal Nutrition Intake  Outcome: Progressing  Goal: Effective Oxygenation and Ventilation  Outcome: Progressing  Goal: Improved Sensorimotor Function  Outcome: Progressing  Goal: Safe and Effective Swallow  Outcome: Progressing  Goal: Effective Urinary Elimination  Outcome: Progressing     Problem: Fall Injury Risk  Goal: Absence of Fall and Fall-Related Injury  Outcome: Progressing     Problem: Infection  Goal: Absence of Infection Signs and Symptoms  Outcome: Progressing     Problem: Skin Injury Risk Increased  Goal: Skin Health and Integrity  Outcome: Progressing

## 2024-08-11 NOTE — ASSESSMENT & PLAN NOTE
Acute infarct of L frontal lobe and insula suspected on CTA and confirmed on MRI.    Antithrombotics for secondary stroke prevention: Antiplatelets: None: Hold all Antithrombotics x 24 hours after IV Thrombolytic therapy administration    Statins for secondary stroke prevention and hyperlipidemia, if present:   Statins: Atorvastatin- 80 mg daily    Aggressive risk factor modification: None     Rehab efforts: The patient has been evaluated by a stroke team provider and the therapy needs have been fully considered based off the presenting complaints and exam findings. The following therapy evaluations are needed: PT evaluate and treat, OT evaluate and treat, SLP evaluate and treat, PM&R evaluate for appropriate placement    Diagnostics ordered/pending: CT scan of head without contrast to asses brain parenchyma, CTA Head to assess vasculature , CTA Neck/Arch to assess vasculature, HgbA1C to assess blood glucose levels, Lipid Profile to assess cholesterol levels, MRI head without contrast to assess brain parenchyma, TTE to assess cardiac function/status , TSH to assess thyroid function    VTE prophylaxis: Mechanical prophylaxis: Place SCDs  None: Reason for No Pharmacological VTE Prophylaxis: Holding x 24 hours s/p treatment with Thrombolytic therapy    BP parameters: Infarct: Post Thrombolytic therapy, SBP <180

## 2024-08-11 NOTE — HPI
Patient is a 53 yo man with hx of HLD who came in to [place] ED bib EMS with concerns for altered mentation. Per documentation patient started having symptoms around 1330 8/10 and on arrival stroke activated CT Head concerning for left GW differentiation loss in L insular cortex and frontal areas. Telestroke evaluated and ordered MRI brain confirmed findings of acute infarction within that region in addition to chronic microvascular changes. Received TNK at 1530 yesterday. CTA negative for LVO. Main symptom was dysarthria and facial droop. Doing better today.    Brought to the ICU for monitoring post TNK

## 2024-08-11 NOTE — PLAN OF CARE
Pt oriented x4. Afebrile. Neuro checks completed Q 1 as ordered. MRI brain completed last night, see results. NIH score 3 at this time. Pt able to move all extremities independently. Pt remains with expressive aphasia, able to follow commands. No numbness or tingling reported. Pt with asymmetry to L side upon smiling. Sinus bradycardia on monitor, MD aware. BP stable. O2 sats stable on RA. UO adequate. No BM overnight. Pt passed Prasad bedside swallow screen. SLP to evaluate today. POC reviewed with patient. Labs reviewed, safety maintained. Care ongoing.     Problem: Adult Inpatient Plan of Care  Goal: Plan of Care Review  8/11/2024 0649 by Iris Hall RN  Outcome: Progressing  8/11/2024 0648 by Iris Hall RN  Outcome: Progressing  Goal: Patient-Specific Goal (Individualized)  8/11/2024 0649 by Iris Hall RN  Outcome: Progressing  Goal: Absence of Hospital-Acquired Illness or Injury  8/11/2024 0649 by Iris Hall RN  Outcome: Progressing    Goal: Optimal Comfort and Wellbeing  8/11/2024 0649 by Iris Hall RN  Outcome: Progressing    Goal: Readiness for Transition of Care  8/11/2024 0649 by Iris Hall RN  Outcome: Progressing     Problem: Stroke, Ischemic (Includes Transient Ischemic Attack)  Goal: Optimal Coping  8/11/2024 0649 by Iris Hall RN  Outcome: Progressing    Goal: Effective Bowel Elimination  8/11/2024 0649 by Iris Hall RN  Outcome: Progressing    Goal: Optimal Cerebral Tissue Perfusion  8/11/2024 0649 by Iris Hall RN  Outcome: Progressing    Goal: Optimal Cognitive Function  8/11/2024 0649 by Iris Hall RN  Outcome: Progressing    Goal: Improved Communication Skills  8/11/2024 0649 by Iris Hall RN  Outcome: Progressing    Goal: Optimal Functional Ability  8/11/2024 0649 by Iris Hall RN  Outcome: Progressing    Goal: Optimal Nutrition Intake  8/11/2024 0649 by Iris Hall RN  Outcome:  Progressing    Goal: Effective Oxygenation and Ventilation  8/11/2024 0649 by Iris Hall RN  Outcome: Progressing    Goal: Improved Sensorimotor Function  8/11/2024 0649 by Iris Hall RN  Outcome: Progressing    Goal: Safe and Effective Swallow  8/11/2024 0649 by Iris Hall RN  Outcome: Progressing    Goal: Effective Urinary Elimination  8/11/2024 0649 by Iris Hall RN  Outcome: Progressing     Problem: Fall Injury Risk  Goal: Absence of Fall and Fall-Related Injury  8/11/2024 0649 by Iris Hall RN  Outcome: Progressing     Problem: Infection  Goal: Absence of Infection Signs and Symptoms  8/11/2024 0649 by Iris Hall RN  Outcome: Progressing     Problem: Skin Injury Risk Increased  Goal: Skin Health and Integrity  8/11/2024 0649 by Iris Hall RN  Outcome: Progressing

## 2024-08-11 NOTE — PLAN OF CARE
ST provided BSS and portions of the Western Aphasia Battery this evaluation. Oropharyngeal skills WFL at this time. Recommend regular consistency solids and thin liquids, whole pills ok. Pt presented with moderate aphasia. ST will follow up with tx for expressive/receptive language impairment.

## 2024-08-11 NOTE — SUBJECTIVE & OBJECTIVE
Past Medical History:   Diagnosis Date    GERD (gastroesophageal reflux disease)     Insomnia        History reviewed. No pertinent surgical history.    Review of patient's allergies indicates:  No Known Allergies    No current facility-administered medications on file prior to encounter.     Current Outpatient Medications on File Prior to Encounter   Medication Sig    atorvastatin (LIPITOR) 40 MG tablet Take 1 tablet (40 mg total) by mouth every evening.    doxepin (SINEQUAN) 100 MG capsule TAKE 1 CAPSULE BY MOUTH EVERY DAY IN THE EVENING    EScitalopram oxalate (LEXAPRO) 10 MG tablet TAKE 1 TABLET BY MOUTH EVERY DAY    pantoprazole (PROTONIX) 40 MG tablet TAKE 1 TABLET BY MOUTH EVERY DAY BEFORE BREAKFAST     Family History       Problem Relation (Age of Onset)    Hypertension Mother          Tobacco Use    Smoking status: Never    Smokeless tobacco: Never   Substance and Sexual Activity    Alcohol use: Yes     Comment: 1 a month    Drug use: Not on file    Sexual activity: Not on file     Review of Systems   Unable to perform ROS: Acuity of condition     Objective:     Vital Signs (Most Recent):  Temp: 98.6 °F (37 °C) (08/10/24 2352)  Pulse: 65 (08/10/24 2352)  Resp: (!) 23 (08/10/24 2352)  BP: 137/74 (08/10/24 2352)  SpO2: 99 % (08/10/24 2352) Vital Signs (24h Range):  Temp:  [97.8 °F (36.6 °C)-98.6 °F (37 °C)] 98.6 °F (37 °C)  Pulse:  [60-78] 65  Resp:  [12-33] 23  SpO2:  [95 %-100 %] 99 %  BP: (106-137)/(65-80) 137/74     Weight: 75 kg (165 lb 5.5 oz)  Body mass index is 25.9 kg/m².     Physical Exam  Vitals and nursing note reviewed.   Constitutional:       General: He is not in acute distress.     Appearance: He is not ill-appearing.   HENT:      Head: Normocephalic and atraumatic.      Nose: Nose normal.      Mouth/Throat:      Mouth: Mucous membranes are moist.      Pharynx: Oropharynx is clear.   Eyes:      Extraocular Movements: Extraocular movements intact.      Conjunctiva/sclera: Conjunctivae normal.       Pupils: Pupils are equal, round, and reactive to light.   Cardiovascular:      Rate and Rhythm: Normal rate and regular rhythm.      Pulses: Normal pulses.      Heart sounds: Normal heart sounds. No murmur heard.     No friction rub. No gallop.   Pulmonary:      Effort: Pulmonary effort is normal. No respiratory distress.      Breath sounds: Normal breath sounds. No wheezing, rhonchi or rales.   Abdominal:      General: Bowel sounds are normal. There is no distension.      Palpations: Abdomen is soft.      Tenderness: There is no abdominal tenderness. There is no guarding or rebound.   Genitourinary:     Comments: deferred  Musculoskeletal:         General: Normal range of motion.      Cervical back: Normal range of motion and neck supple.      Right lower leg: No edema.      Left lower leg: No edema.   Skin:     General: Skin is warm and dry.      Capillary Refill: Capillary refill takes less than 2 seconds.   Neurological:      Mental Status: He is alert.      GCS: GCS eye subscore is 4. GCS verbal subscore is 1. GCS motor subscore is 6.      Cranial Nerves: Dysarthria present.      Sensory: Sensation is intact.      Motor: Motor function is intact.      Coordination: Coordination is intact.   Psychiatric:      Comments: AARON              CRANIAL NERVES     CN III, IV, VI   Pupils are equal, round, and reactive to light.       Significant Labs: All pertinent labs within the past 24 hours have been reviewed.  Recent Lab Results  (Last 5 results in the past 24 hours)        08/10/24  1558   08/10/24  1513   08/10/24  1511   08/10/24  1459   08/10/24  1454        Albumin 3.6               ALP 66               ALT 26               Anion Gap 8               PTT 26.2  Comment: Refer to local heparin nomogram for intensity/dose specific   therapeutic   range.  LOT^[id number]^APTT [id number]                 AST 23               Baso #       0.03         Basophil %       0.4         BILIRUBIN TOTAL 0.2  Comment: For infants  and newborns, interpretation of results should be based  on gestational age, weight and in agreement with clinical  observations.    Premature Infant recommended reference ranges:  Up to 24 hours.............<8.0 mg/dL  Up to 48 hours............<12.0 mg/dL  3-5 days..................<15.0 mg/dL  6-29 days.................<15.0 mg/dL                 Site   Other   Other           BUN 24               Calcium 8.9               Chloride 107               Cholesterol Total 220  Comment: The National Cholesterol Education Program (NCEP) has set the  following guidelines (reference ranges) for Cholesterol:  Optimal.....................<200 mg/dL  Borderline High.............200-239 mg/dL  High........................> or = 240 mg/dL                 CO2 24               Creatinine 1.0               DelSys   Room Air   Room Air           Differential Method       Automated         eGFR >60               Eos #       0.3         Eos %       4.5         Estimated Avg Glucose 114               Glucose 95               Gran # (ANC)       4.0         Gran %       58.3         HDL 33  Comment: The National Cholesterol Education Program (NCEP) has set the  following guidelines (reference values) for HDL Cholesterol:  Low...............<40 mg/dL  Optimal...........>60 mg/dL                 HDL/Cholesterol Ratio 15.0               Hematocrit       41.1         Hemoglobin       13.8         Hemoglobin A1C External 5.6  Comment: ADA Screening Guidelines:  5.7-6.4%  Consistent with prediabetes  >or=6.5%  Consistent with diabetes    High levels of fetal hemoglobin interfere with the HbA1C  assay. Heterozygous hemoglobin variants (HbS, HgC, etc)do  not significantly interfere with this assay.   However, presence of multiple variants may affect accuracy.                 Immature Grans (Abs)       0.03  Comment: Mild elevation in immature granulocytes is non specific and   can be seen in a variety of conditions including stress response,    acute inflammation, trauma and pregnancy. Correlation with other   laboratory and clinical findings is essential.           Immature Granulocytes       0.4         LDL Cholesterol 133.0  Comment: The National Cholesterol Education Program (NCEP) has set the  following guidelines (reference values) for LDL Cholesterol:  Optimal.......................<130 mg/dL  Borderline High...............130-159 mg/dL  High..........................160-189 mg/dL  Very High.....................>190 mg/dL                 Lymph #       2.0         Lymph %       29.5         MCH       29.4         MCHC       33.6         MCV       88         Mono #       0.5         Mono %       6.9         MPV       11.8         Non-HDL Cholesterol 187  Comment: Risk category and Non-HDL cholesterol goals:  Coronary heart disease (CHD)or equivalent (10-year risk of CHD >20%):  Non-HDL cholesterol goal     <130 mg/dL  Two or more CHD risk factors and 10-year risk of CHD <= 20%:  Non-HDL cholesterol goal     <160 mg/dL  0 to 1 CHD risk factor:  Non-HDL cholesterol goal     <190 mg/dL                 nRBC       0         Platelet Count       267         POC Creatinine   1.1             POC PTINR     1.2           POCT Glucose         132       Potassium 4.2               PROTEIN TOTAL 6.8               RBC       4.69         RDW       14.2         Sample   VENOUS   VENOUS           Sodium 139               Total Cholesterol/HDL Ratio 6.7               Triglycerides 270  Comment: The National Cholesterol Education Program (NCEP) has set the  following guidelines (reference values) for triglycerides:  Normal......................<150 mg/dL  Borderline High.............150-199 mg/dL  High........................200-499 mg/dL                 TSH 2.195               WBC       6.91                                Significant Imaging: I have reviewed all pertinent imaging results/findings within the past 24 hours.

## 2024-08-11 NOTE — CONSULTS
Food & Nutrition  Education    Diet Education: Cardiac Education  Time Spent: RD remote  Learners: Patient       Nutrition Education provided with handouts:   + Clinical Reference attachments to d/c documents    Nutrition Related Social Determinants of Health: SDOH: Unable to assess at this time.     Comments:  Patient is on a Low fat/cholesterol oral diet.  Patient admitted with CVA diagnosis.  Labs reviewed.  SAMIRA.  LBM:8/9/2024   I/O since admit -985mL.  Education handouts attached to discharge paperwork. Patient is currently in the ICU.  SLP is following patient.  RD recommends ONS BID as preventative MNT.  RD to continue to monitor po intake and tolerance.     Patient Active Problem List   Diagnosis    Dyslipidemia    Insomnia    N&V (nausea and vomiting)    LUDY (obstructive sleep apnea)    Sleep stage dysfunction    Cerebrovascular accident (CVA) due to occlusion of left middle cerebral artery     Past Medical History:   Diagnosis Date    GERD (gastroesophageal reflux disease)     Insomnia      BMP  Lab Results   Component Value Date     08/11/2024    K 4.2 08/11/2024     08/11/2024    CO2 22 (L) 08/11/2024    BUN 18 08/11/2024    CREATININE 0.9 08/11/2024    CALCIUM 8.8 08/11/2024    ANIONGAP 8 08/11/2024    EGFRNORACEVR >60 08/11/2024     Lab Results   Component Value Date    HGBA1C 5.6 08/10/2024     Lab Results   Component Value Date    CALCIUM 8.8 08/11/2024    PHOS 3.5 08/11/2024       Scheduled Meds:   mupirocin   Nasal BID     Continuous Infusions:  PRN Meds:.  Current Facility-Administered Medications:     bisacodyL, 10 mg, Rectal, Daily PRN    labetalol, 10 mg, Intravenous, Q15 Min PRN    ondansetron, 4 mg, Intravenous, Q12H PRN    sodium chloride 0.9%, 500 mL, Intravenous, PRN    sodium chloride 0.9%, 10 mL, Intravenous, PRN       All questions and concerns answered. Dietitian's contact information provided.       Follow-Up: Yes     Please Re-consult as needed        Thanks  Mei  Kulwant, MS, RDN, LDN

## 2024-08-11 NOTE — SUBJECTIVE & OBJECTIVE
Past Medical History:   Diagnosis Date    GERD (gastroesophageal reflux disease)     Insomnia        History reviewed. No pertinent surgical history.    Review of patient's allergies indicates:  No Known Allergies    Family History       Problem Relation (Age of Onset)    Hypertension Mother          Tobacco Use    Smoking status: Never    Smokeless tobacco: Never   Substance and Sexual Activity    Alcohol use: Yes     Comment: 1 a month    Drug use: Not on file    Sexual activity: Not on file      Review of Systems   All other systems reviewed and are negative.    Objective:     Vital Signs (Most Recent):  Temp: 98.9 °F (37.2 °C) (08/11/24 1515)  Pulse: 71 (08/11/24 1600)  Resp: 20 (08/11/24 1600)  BP: 124/70 (08/11/24 1600)  SpO2: 96 % (08/11/24 1600) Vital Signs (24h Range):  Temp:  [98 °F (36.7 °C)-98.9 °F (37.2 °C)] 98.9 °F (37.2 °C)  Pulse:  [53-84] 71  Resp:  [11-43] 20  SpO2:  [94 %-100 %] 96 %  BP: (115-151)/(65-91) 124/70   Weight: 75 kg (165 lb 5.5 oz)  Body mass index is 25.9 kg/m².      Intake/Output Summary (Last 24 hours) at 8/11/2024 1621  Last data filed at 8/11/2024 1136  Gross per 24 hour   Intake 240 ml   Output 1225 ml   Net -985 ml          Physical Exam  Vitals and nursing note reviewed.   Constitutional:       General: He is not in acute distress.     Appearance: He is not ill-appearing.   HENT:      Head: Normocephalic and atraumatic.      Nose: Nose normal.      Mouth/Throat:      Mouth: Mucous membranes are moist.      Pharynx: Oropharynx is clear.   Eyes:      General:         Right eye: No discharge.         Left eye: No discharge.      Extraocular Movements: Extraocular movements intact.      Conjunctiva/sclera: Conjunctivae normal.      Pupils: Pupils are equal, round, and reactive to light.   Cardiovascular:      Rate and Rhythm: Normal rate and regular rhythm.      Pulses: Normal pulses.      Heart sounds: Normal heart sounds. No murmur heard.     No friction rub. No gallop.    Pulmonary:      Effort: Pulmonary effort is normal. No respiratory distress.      Breath sounds: Normal breath sounds. No wheezing, rhonchi or rales.   Abdominal:      General: Bowel sounds are normal. There is no distension.      Palpations: Abdomen is soft.      Tenderness: There is no abdominal tenderness. There is no guarding or rebound.   Musculoskeletal:         General: No swelling. Normal range of motion.      Cervical back: Normal range of motion and neck supple.      Right lower leg: No edema.      Left lower leg: No edema.   Skin:     General: Skin is warm and dry.      Capillary Refill: Capillary refill takes less than 2 seconds.   Neurological:      Mental Status: He is alert and oriented to person, place, and time. Mental status is at baseline.      Cranial Nerves: Dysarthria present.      Sensory: Sensation is intact.      Motor: Motor function is intact.            Vents:     Lines/Drains/Airways       Peripheral Intravenous Line  Duration                  Peripheral IV - Single Lumen 08/10/24 1514 18 G Left Antecubital 1 day         Peripheral IV - Single Lumen 08/10/24 18 G Right Antecubital 1 day                  Significant Labs:    CBC/Anemia Profile:  Recent Labs   Lab 08/10/24  1459 08/11/24  0414   WBC 6.91 9.89   HGB 13.8* 13.8*   HCT 41.1 41.2    213   MCV 88 88   RDW 14.2 14.1        Chemistries:  Recent Labs   Lab 08/10/24  1558 08/11/24  0414    137   K 4.2 4.2    107   CO2 24 22*   BUN 24* 18   CREATININE 1.0 0.9   CALCIUM 8.9 8.8   ALBUMIN 3.6 3.7   PROT 6.8 6.8   BILITOT 0.2 0.4   ALKPHOS 66 63   ALT 26 28   AST 23 31   MG  --  2.3   PHOS  --  3.5       All pertinent labs within the past 24 hours have been reviewed.    Significant Imaging: I have reviewed all pertinent imaging results/findings within the past 24 hours.

## 2024-08-11 NOTE — NURSING
Pt received from ED via stretcher. Pt able to transfer independently to bed. Cardiac monitoring applied. VSS. Pt oriented to room, call bell within reach. Transfer of care complete.

## 2024-08-11 NOTE — PLAN OF CARE
Problem: Physical Therapy  Goal: Physical Therapy Goal  Description: Goals met   Pt is not in need of skilled PT services            Outcome: Met

## 2024-08-11 NOTE — CONSULTS
NEUROLOGY CONSULT EVALUATION    Reason for consult: Stroke    Informant: patient and chart         HPI:     Monster Crespo is a 54 y.o. year old male who has a PMH of dyslipidemia who presented to [facility] ED via EMS for difficulty speaking. Per his son suddenly began having issues w/ speaking while the pt was responsive. LKW approx 1330 on 8/10/24.   Stroke activated at the ED. TNK was given by vascular neurology and admitted to the ICU for further monitoring.  CTA H/N shows suspected subtle loss of gray-white differentiation in left insular cortex which can be seen with acute infarct. MRI showed stroke at left frontal lobe and insula.  Today in the am, pt states that he feels much better and that he feels he is almost back to baseline. Has some trouble with certain words during NIH scoring.  Per nurse at bedside he has significantly improved. No other new neurological complaints including weakness, sensory deficits, seizures, headaches, tingling , paraesthesias.    ROS: is as above    Histories:     Allergies:  Patient has no known allergies.    Current Medications:    Current Facility-Administered Medications   Medication Dose Route Frequency Provider Last Rate Last Admin    bisacodyL suppository 10 mg  10 mg Rectal Daily PRN Ida Cordero NP        labetaloL injection 10 mg  10 mg Intravenous Q15 Min PRN Ida Cordero NP        mupirocin 2 % ointment   Nasal BID Kaur Latif MD   Given at 08/11/24 0919    ondansetron injection 4 mg  4 mg Intravenous Q12H PRN Ida Cordero NP        sodium chloride 0.9% bolus 500 mL 500 mL  500 mL Intravenous PRN Ida Cordero NP        sodium chloride 0.9% flush 10 mL  10 mL Intravenous PRN Ida Cordero NP           Past Medical/Surgical/Family History:  PMHx:   Past Medical History:   Diagnosis Date    GERD (gastroesophageal reflux disease)     Insomnia       Surgeries: History reviewed. No pertinent surgical history.   Family  Hx:   Family History    Problem Relation Name Age of Onset    Hypertension Mother      Amblyopia Neg Hx      Blindness Neg Hx      Cataracts Neg Hx      Glaucoma Neg Hx      Macular degeneration Neg Hx      Retinal detachment Neg Hx      Strabismus Neg Hx      ;    Social History:  Substance Abuse/Dependence Histor  Tobacco: denies  EtOH: occasionally    Occupational/Employment History:  Occupation: At [employer] facility.    Current Evaluation:     Vital Signs:   Vitals:    08/11/24 1200   BP: 135/71   Pulse: (!) 59   Resp: 12   Temp:         Neurological Examination     NIH Scale:  Interval: baseline  1a. Level of Consciousness: 0-->Alert, keenly responsive  1b. LOC Questions: 0-->Answers both questions correctly  1c. LOC Commands: 0-->Performs both tasks correctly  2. Best Gaze: 0-->Normal  3. Visual: 0-->No visual loss  4. Facial Palsy: 0-->Normal symmetrical movements  5a. Motor Arm, Left: 0-->No drift, limb holds 90 (or 45) degrees for full 10 secs  5b. Motor Arm, Right: 0-->No drift, limb holds 90 (or 45) degrees for full 10 secs  6a. Motor Leg, Left: 0-->No drift, leg holds 30 degree position for full 5 secs  6b. Motor Leg, Right: 0-->No drift, leg holds 30 degree position for full 5 secs  7. Limb Ataxia: 0-->Absent  8. Sensory: 0-->Normal, no sensory loss  9. Best Language: 1-->Mild anomia  10. Dysarthria: 0--> Some trouble with certain words.  11. Extinction and Inattention (formerly Neglect): 0-->No abnormality  Total (NIH Stroke Scale): 1    Orientation  Alert, awake, oriented to self, place, time, and situation.    Cranial Nerves  PERRL, VF intact, EOMI, V1-V3 intact, symmetric facial expression, SCM & TPZ 5/5, tongue midline.    Motor  5/5 strength in 4 extremities    Sensory  intact to light touch on all extremities    Cerebellar/Gait  Normal finger to nose and heel to shin.       LABORATORY STUDIES:  Recent Results (from the past 24 hour(s))   POCT glucose    Collection Time: 08/10/24  2:54 PM   Result Value Ref Range     POCT Glucose 132 (H) 70 - 110 mg/dL   CBC W/ AUTO DIFFERENTIAL    Collection Time: 08/10/24  2:59 PM   Result Value Ref Range    WBC 6.91 3.90 - 12.70 K/uL    RBC 4.69 4.60 - 6.20 M/uL    Hemoglobin 13.8 (L) 14.0 - 18.0 g/dL    Hematocrit 41.1 40.0 - 54.0 %    MCV 88 82 - 98 fL    MCH 29.4 27.0 - 31.0 pg    MCHC 33.6 32.0 - 36.0 g/dL    RDW 14.2 11.5 - 14.5 %    Platelets 267 150 - 450 K/uL    MPV 11.8 9.2 - 12.9 fL    Immature Granulocytes 0.4 0.0 - 0.5 %    Gran # (ANC) 4.0 1.8 - 7.7 K/uL    Immature Grans (Abs) 0.03 0.00 - 0.04 K/uL    Lymph # 2.0 1.0 - 4.8 K/uL    Mono # 0.5 0.3 - 1.0 K/uL    Eos # 0.3 0.0 - 0.5 K/uL    Baso # 0.03 0.00 - 0.20 K/uL    nRBC 0 0 /100 WBC    Gran % 58.3 38.0 - 73.0 %    Lymph % 29.5 18.0 - 48.0 %    Mono % 6.9 4.0 - 15.0 %    Eosinophil % 4.5 0.0 - 8.0 %    Basophil % 0.4 0.0 - 1.9 %    Differential Method Automated    ISTAT PROCEDURE    Collection Time: 08/10/24  3:11 PM   Result Value Ref Range    POC PTINR 1.2 0.9 - 1.2    Sample VENOUS     Site Other     [facility] Air    ISTAT CREATININE    Collection Time: 08/10/24  3:13 PM   Result Value Ref Range    POC Creatinine 1.1 0.5 - 1.4 mg/dL    Sample VENOUS     Site Other     [facility] Room Air    Comprehensive metabolic panel    Collection Time: 08/10/24  3:58 PM   Result Value Ref Range    Sodium 139 136 - 145 mmol/L    Potassium 4.2 3.5 - 5.1 mmol/L    Chloride 107 95 - 110 mmol/L    CO2 24 23 - 29 mmol/L    Glucose 95 70 - 110 mg/dL    BUN 24 (H) 6 - 20 mg/dL    Creatinine 1.0 0.5 - 1.4 mg/dL    Calcium 8.9 8.7 - 10.5 mg/dL    Total Protein 6.8 6.0 - 8.4 g/dL    Albumin 3.6 3.5 - 5.2 g/dL    Total Bilirubin 0.2 0.1 - 1.0 mg/dL    Alkaline Phosphatase 66 55 - 135 U/L    AST 23 10 - 40 U/L    ALT 26 10 - 44 U/L    eGFR >60 >60 mL/min/1.73 m^2    Anion Gap 8 8 - 16 mmol/L   TSH    Collection Time: 08/10/24  3:58 PM   Result Value Ref Range    TSH 2.195 0.400 - 4.000 uIU/mL   Lipid panel    Collection Time: 08/10/24  3:58 PM    Result Value Ref Range    Cholesterol 220 (H) 120 - 199 mg/dL    Triglycerides 270 (H) 30 - 150 mg/dL    HDL 33 (L) 40 - 75 mg/dL    LDL Cholesterol 133.0 63.0 - 159.0 mg/dL    HDL/Cholesterol Ratio 15.0 (L) 20.0 - 50.0 %    Total Cholesterol/HDL Ratio 6.7 (H) 2.0 - 5.0    Non-HDL Cholesterol 187 mg/dL   APTT    Collection Time: 08/10/24  3:58 PM   Result Value Ref Range    aPTT 26.2 21.0 - 32.0 sec   Hemoglobin A1C    Collection Time: 08/10/24  3:58 PM   Result Value Ref Range    Hemoglobin A1C 5.6 4.0 - 5.6 %    Estimated Avg Glucose 114 68 - 131 mg/dL   Comprehensive metabolic panel    Collection Time: 08/11/24  4:14 AM   Result Value Ref Range    Sodium 137 136 - 145 mmol/L    Potassium 4.2 3.5 - 5.1 mmol/L    Chloride 107 95 - 110 mmol/L    CO2 22 (L) 23 - 29 mmol/L    Glucose 98 70 - 110 mg/dL    BUN 18 6 - 20 mg/dL    Creatinine 0.9 0.5 - 1.4 mg/dL    Calcium 8.8 8.7 - 10.5 mg/dL    Total Protein 6.8 6.0 - 8.4 g/dL    Albumin 3.7 3.5 - 5.2 g/dL    Total Bilirubin 0.4 0.1 - 1.0 mg/dL    Alkaline Phosphatase 63 55 - 135 U/L    AST 31 10 - 40 U/L    ALT 28 10 - 44 U/L    eGFR >60 >60 mL/min/1.73 m^2    Anion Gap 8 8 - 16 mmol/L   Magnesium    Collection Time: 08/11/24  4:14 AM   Result Value Ref Range    Magnesium 2.3 1.6 - 2.6 mg/dL   Phosphorus    Collection Time: 08/11/24  4:14 AM   Result Value Ref Range    Phosphorus 3.5 2.7 - 4.5 mg/dL   CBC auto differential    Collection Time: 08/11/24  4:14 AM   Result Value Ref Range    WBC 9.89 3.90 - 12.70 K/uL    RBC 4.70 4.60 - 6.20 M/uL    Hemoglobin 13.8 (L) 14.0 - 18.0 g/dL    Hematocrit 41.2 40.0 - 54.0 %    MCV 88 82 - 98 fL    MCH 29.4 27.0 - 31.0 pg    MCHC 33.5 32.0 - 36.0 g/dL    RDW 14.1 11.5 - 14.5 %    Platelets 213 150 - 450 K/uL    MPV 9.8 9.2 - 12.9 fL    Immature Granulocytes 0.5 0.0 - 0.5 %    Gran # (ANC) 6.8 1.8 - 7.7 K/uL    Immature Grans (Abs) 0.05 (H) 0.00 - 0.04 K/uL    Lymph # 2.0 1.0 - 4.8 K/uL    Mono # 0.7 0.3 - 1.0 K/uL    Eos # 0.3  0.0 - 0.5 K/uL    Baso # 0.02 0.00 - 0.20 K/uL    nRBC 0 0 /100 WBC    Gran % 68.9 38.0 - 73.0 %    Lymph % 20.4 18.0 - 48.0 %    Mono % 7.5 4.0 - 15.0 %    Eosinophil % 2.5 0.0 - 8.0 %    Basophil % 0.2 0.0 - 1.9 %    Platelet Estimate Appears normal     Differential Method Automated    Troponin I    Collection Time: 08/11/24  4:14 AM   Result Value Ref Range    Troponin I <0.006 0.000 - 0.026 ng/mL   APTT    Collection Time: 08/11/24  4:14 AM   Result Value Ref Range    aPTT 22.8 21.0 - 32.0 sec   Protime-INR    Collection Time: 08/11/24  4:14 AM   Result Value Ref Range    Prothrombin Time 11.4 9.0 - 12.5 sec    INR 1.1 0.8 - 1.2         RADIOLOGY STUDIES:    I have personally reviewed the images performed.   CT Head without Contrast  No acute intracranial process.   CT Head Repeat 24h post TNK  pending  CT Angio Head & Neck   1. Unremarkable CTA head and neck.  No large vessel occlusion or high-grade stenosis.  2. Suspected subtle loss of gray-white differentiation in the left insular cortex, which can be seen with an acute infarct.   MRI Brain without contrast  Acute infarction in the left frontal lobe and insula.     Assessment and Plan:     ASSESSMENT:  Monster Crespo is a 54 y.o. year old male with a PMH of dyslipidemia who presented to [facility] ED via EMS for difficulty speaking. Per his son suddenly began having issues w/ speaking while the pt was responsive. LKW approx 1330 on 8/10/24. Stroke activated at the ED. TNK was given by vascular neurology and admitted to the ICU for further monitoring.      Stroke Scores:  - Initial NIHSS: 4  - NIHSS today: 1    Stroke Workup:  Imaging Results:  8/10/2024 CT Head without Contrast  No acute intracranial process.   8/11/2024 17:08 CT Head Repeat 24h post TNK evolving infarct with no bleeding or mass effect.    CT Angio Head & Neck   1. Unremarkable CTA head and neck.  No large vessel occlusion or high-grade stenosis.  2. Suspected subtle loss of gray-white  differentiation in the left insular cortex, which can be seen with an acute infarct.   MRI Brain without contrast  Acute infarction in the left frontal lobe and insula.   Risk Stratification Labs:  *HbA1C: 5.6  *LDL: 133  *TSH: 2.195  *Utox: recommend  *HIV:recommend  *RPR: recommend    PLAN:  Neurological:  #Stroke    - Continue in ICU with q1h neuro-checks, on tele and continuous pulse ox  - HOB flat and bedrest for 24 hours for ischemic stroke  - Maintain euvolemia, euglycemia & euthermia   - Daily CBC, CMP  - UA  - Permissive HTN with SBP < 180 and DBP < 105 for 24 hours post TNK.  - Trop -< 0.006  - EKG pending  - Keep NPO until dysphagia screen; then NPO except for meds  *Advance diet as per SLP recommendations  - PT/OT eval & treat   -Speech/language  therapy  - Keep K>4 and Mg>2, phos in normal range  - Repeat CT head wo contrast 24 hours post tPA   - Hold AP( Aspirin 81 mg and Clopidogrel 75 mg once daily)  therapy for 24 hours post TNK  as well as the repeat 24 h post TNK CTH findings( to assess for any bleeding).  - Stroke education  - Loop recorder to assess for Afib  - 2D echocardiogram to assess cardiac function with bubble study      Pt  seen and staffed w/ Dr. Pita Moctezuma   12:44 PM     Differential diagnosis was explained to the patient. All questions were answered. Patient understood and agreed to adhere to plan.     Carol Moctezuma MD  [facility] PGY-II  [facility] Consult Service     I have reviewed the notes, assessments, and/or procedures performed by Dr. Moctezuma; reviewed CTA, I concur with her/his documentation of Monster Crespo.

## 2024-08-12 VITALS
RESPIRATION RATE: 20 BRPM | OXYGEN SATURATION: 95 % | WEIGHT: 176.13 LBS | TEMPERATURE: 99 F | HEIGHT: 67 IN | HEART RATE: 82 BPM | DIASTOLIC BLOOD PRESSURE: 80 MMHG | BODY MASS INDEX: 27.64 KG/M2 | SYSTOLIC BLOOD PRESSURE: 134 MMHG

## 2024-08-12 LAB
ALBUMIN SERPL BCP-MCNC: 3.9 G/DL (ref 3.5–5.2)
ALP SERPL-CCNC: 72 U/L (ref 55–135)
ALT SERPL W/O P-5'-P-CCNC: 27 U/L (ref 10–44)
ANION GAP SERPL CALC-SCNC: 8 MMOL/L (ref 8–16)
APICAL FOUR CHAMBER EJECTION FRACTION: 65 %
APICAL TWO CHAMBER EJECTION FRACTION: 75 %
ASCENDING AORTA: 2.78 CM
AST SERPL-CCNC: 23 U/L (ref 10–40)
AV INDEX (PROSTH): 0.65
AV MEAN GRADIENT: 5 MMHG
AV PEAK GRADIENT: 10 MMHG
AV VALVE AREA BY VELOCITY RATIO: 2.48 CM²
AV VALVE AREA: 2.58 CM²
AV VELOCITY RATIO: 0.63
BASOPHILS # BLD AUTO: 0.03 K/UL (ref 0–0.2)
BASOPHILS NFR BLD: 0.3 % (ref 0–1.9)
BILIRUB SERPL-MCNC: 0.8 MG/DL (ref 0.1–1)
BSA FOR ECHO PROCEDURE: 1.88 M2
BUN SERPL-MCNC: 22 MG/DL (ref 6–20)
CALCIUM SERPL-MCNC: 9.1 MG/DL (ref 8.7–10.5)
CHLORIDE SERPL-SCNC: 104 MMOL/L (ref 95–110)
CO2 SERPL-SCNC: 25 MMOL/L (ref 23–29)
CREAT SERPL-MCNC: 1 MG/DL (ref 0.5–1.4)
CV ECHO LV RWT: 0.55 CM
DIFFERENTIAL METHOD BLD: ABNORMAL
DOP CALC AO PEAK VEL: 1.6 M/S
DOP CALC AO VTI: 28.5 CM
DOP CALC LVOT AREA: 4 CM2
DOP CALC LVOT DIAMETER: 2.25 CM
DOP CALC LVOT PEAK VEL: 1 M/S
DOP CALC LVOT STROKE VOLUME: 73.52 CM3
DOP CALC MV VTI: 28.4 CM
DOP CALCLVOT PEAK VEL VTI: 18.5 CM
E WAVE DECELERATION TIME: 283.97 MSEC
E/A RATIO: 1
E/E' RATIO: 7.6 M/S
ECHO LV POSTERIOR WALL: 1.26 CM (ref 0.6–1.1)
EOSINOPHIL # BLD AUTO: 0.2 K/UL (ref 0–0.5)
EOSINOPHIL NFR BLD: 1.5 % (ref 0–8)
ERYTHROCYTE [DISTWIDTH] IN BLOOD BY AUTOMATED COUNT: 14.3 % (ref 11.5–14.5)
EST. GFR  (NO RACE VARIABLE): >60 ML/MIN/1.73 M^2
FRACTIONAL SHORTENING: 46 % (ref 28–44)
GLUCOSE SERPL-MCNC: 117 MG/DL (ref 70–110)
HCT VFR BLD AUTO: 43.3 % (ref 40–54)
HGB BLD-MCNC: 14.7 G/DL (ref 14–18)
IMM GRANULOCYTES # BLD AUTO: 0.06 K/UL (ref 0–0.04)
IMM GRANULOCYTES NFR BLD AUTO: 0.5 % (ref 0–0.5)
INTERVENTRICULAR SEPTUM: 0.93 CM (ref 0.6–1.1)
IVC DIAMETER: 1.2 CM
LA MAJOR: 5.41 CM
LA MINOR: 5.88 CM
LA WIDTH: 3 CM
LEFT ATRIUM AREA SYSTOLIC (APICAL 2 CHAMBER): 18.49 CM2
LEFT ATRIUM AREA SYSTOLIC (APICAL 4 CHAMBER): 13.15 CM2
LEFT ATRIUM SIZE: 3.82 CM
LEFT ATRIUM VOLUME INDEX MOD: 19 ML/M2
LEFT ATRIUM VOLUME INDEX: 29.5 ML/M2
LEFT ATRIUM VOLUME MOD: 35.28 CM3
LEFT ATRIUM VOLUME: 54.89 CM3
LEFT INTERNAL DIMENSION IN SYSTOLE: 2.46 CM (ref 2.1–4)
LEFT VENTRICLE DIASTOLIC VOLUME INDEX: 50.94 ML/M2
LEFT VENTRICLE DIASTOLIC VOLUME: 94.74 ML
LEFT VENTRICLE END DIASTOLIC VOLUME APICAL 2 CHAMBER: 34.99 ML
LEFT VENTRICLE END DIASTOLIC VOLUME APICAL 4 CHAMBER: 49.26 ML
LEFT VENTRICLE END SYSTOLIC VOLUME APICAL 2 CHAMBER: 48.45 ML
LEFT VENTRICLE END SYSTOLIC VOLUME APICAL 4 CHAMBER: 24.61 ML
LEFT VENTRICLE MASS INDEX: 95 G/M2
LEFT VENTRICLE SYSTOLIC VOLUME INDEX: 11.5 ML/M2
LEFT VENTRICLE SYSTOLIC VOLUME: 21.44 ML
LEFT VENTRICULAR INTERNAL DIMENSION IN DIASTOLE: 4.55 CM (ref 3.5–6)
LEFT VENTRICULAR MASS: 176.97 G
LV LATERAL E/E' RATIO: 7.13 M/S
LV SEPTAL E/E' RATIO: 8.14 M/S
LVED V (TEICH): 94.74 ML
LVES V (TEICH): 21.44 ML
LVOT MG: 2.22 MMHG
LVOT MV: 0.71 CM/S
LYMPHOCYTES # BLD AUTO: 1.7 K/UL (ref 1–4.8)
LYMPHOCYTES NFR BLD: 14.9 % (ref 18–48)
MCH RBC QN AUTO: 29.3 PG (ref 27–31)
MCHC RBC AUTO-ENTMCNC: 33.9 G/DL (ref 32–36)
MCV RBC AUTO: 86 FL (ref 82–98)
MONOCYTES # BLD AUTO: 0.9 K/UL (ref 0.3–1)
MONOCYTES NFR BLD: 7.5 % (ref 4–15)
MV MEAN GRADIENT: 1 MMHG
MV PEAK A VEL: 0.57 M/S
MV PEAK E VEL: 0.57 M/S
MV PEAK GRADIENT: 2 MMHG
MV VALVE AREA BY CONTINUITY EQUATION: 2.59 CM2
NEUTROPHILS # BLD AUTO: 8.8 K/UL (ref 1.8–7.7)
NEUTROPHILS NFR BLD: 75.3 % (ref 38–73)
NRBC BLD-RTO: 0 /100 WBC
OHS LV EJECTION FRACTION SIMPSONS BIPLANE MOD: 71 %
OHS QRS DURATION: 102 MS
OHS QTC CALCULATION: 414 MS
PISA TR MAX VEL: 0.9 M/S
PLATELET # BLD AUTO: 266 K/UL (ref 150–450)
PMV BLD AUTO: 9.8 FL (ref 9.2–12.9)
POTASSIUM SERPL-SCNC: 3.9 MMOL/L (ref 3.5–5.1)
PROT SERPL-MCNC: 7.2 G/DL (ref 6–8.4)
PULM VEIN S/D RATIO: 1.02
PV MV: 0.66 M/S
PV PEAK D VEL: 0.51 M/S
PV PEAK GRADIENT: 4 MMHG
PV PEAK S VEL: 0.52 M/S
PV PEAK VELOCITY: 0.94 M/S
RA MAJOR: 4.59 CM
RA PRESSURE ESTIMATED: 3 MMHG
RA WIDTH: 3.32 CM
RBC # BLD AUTO: 5.02 M/UL (ref 4.6–6.2)
RIGHT VENTRICLE DIASTOLIC BASEL DIMENSION: 3.3 CM
RV TB RVSP: 4 MMHG
RV TISSUE DOPPLER FREE WALL SYSTOLIC VELOCITY 1 (APICAL 4 CHAMBER VIEW): 12.93 CM/S
SINUS: 3.34 CM
SODIUM SERPL-SCNC: 137 MMOL/L (ref 136–145)
STJ: 2.66 CM
TDI LATERAL: 0.08 M/S
TDI SEPTAL: 0.07 M/S
TDI: 0.08 M/S
TR MAX PG: 3 MMHG
TRICUSPID ANNULAR PLANE SYSTOLIC EXCURSION: 2.17 CM
TV REST PULMONARY ARTERY PRESSURE: 6 MMHG
WBC # BLD AUTO: 11.64 K/UL (ref 3.9–12.7)
Z-SCORE OF LEFT VENTRICULAR DIMENSION IN END DIASTOLE: -1.29
Z-SCORE OF LEFT VENTRICULAR DIMENSION IN END SYSTOLE: -2.06

## 2024-08-12 PROCEDURE — 92507 TX SP LANG VOICE COMM INDIV: CPT

## 2024-08-12 PROCEDURE — 94761 N-INVAS EAR/PLS OXIMETRY MLT: CPT

## 2024-08-12 PROCEDURE — 97535 SELF CARE MNGMENT TRAINING: CPT

## 2024-08-12 PROCEDURE — 99900035 HC TECH TIME PER 15 MIN (STAT)

## 2024-08-12 PROCEDURE — 85025 COMPLETE CBC W/AUTO DIFF WBC: CPT

## 2024-08-12 PROCEDURE — 80053 COMPREHEN METABOLIC PANEL: CPT

## 2024-08-12 PROCEDURE — 25000003 PHARM REV CODE 250

## 2024-08-12 PROCEDURE — 36415 COLL VENOUS BLD VENIPUNCTURE: CPT

## 2024-08-12 RX ORDER — CLOPIDOGREL BISULFATE 75 MG/1
75 TABLET ORAL DAILY
Qty: 30 TABLET | Refills: 0 | Status: SHIPPED | OUTPATIENT
Start: 2024-08-13 | End: 2024-09-12

## 2024-08-12 RX ORDER — NAPROXEN SODIUM 220 MG/1
81 TABLET, FILM COATED ORAL DAILY
Qty: 30 TABLET | Refills: 0 | Status: SHIPPED | OUTPATIENT
Start: 2024-08-13 | End: 2024-09-12

## 2024-08-12 RX ORDER — ATORVASTATIN CALCIUM 80 MG/1
80 TABLET, FILM COATED ORAL NIGHTLY
Qty: 30 TABLET | Refills: 0 | Status: SHIPPED | OUTPATIENT
Start: 2024-08-12 | End: 2024-09-11

## 2024-08-12 RX ADMIN — MUPIROCIN: 20 OINTMENT TOPICAL at 09:08

## 2024-08-12 RX ADMIN — CLOPIDOGREL BISULFATE 75 MG: 75 TABLET ORAL at 09:08

## 2024-08-12 RX ADMIN — ASPIRIN 81 MG 81 MG: 81 TABLET ORAL at 09:08

## 2024-08-12 NOTE — PLAN OF CARE
Introduced as VN and will be reviewing discharge instructions.  Educated patient on reason for admission, home medication list, and discharge instructions including when to return to ED and the following doctor appointments.  Education per flowsheet.  Opportunity given for questions and questions answered.   Nurse notified of   completion of discharge education. Patient is waiting for ride to arrive

## 2024-08-12 NOTE — PLAN OF CARE
Problem: Adult Inpatient Plan of Care  Goal: Plan of Care Review  8/12/2024 0530 by Iris Hall RN  Outcome: Progressing    Goal: Patient-Specific Goal (Individualized)  8/12/2024 0530 by Iris Hall RN  Outcome: Progressing    Goal: Absence of Hospital-Acquired Illness or Injury  8/12/2024 0530 by Iris Hall RN  Outcome: Progressing    Goal: Optimal Comfort and Wellbeing  8/12/2024 0530 by Iris Hall RN  Outcome: Progressing    Goal: Readiness for Transition of Care  8/12/2024 0530 by Iris Hall RN  Outcome: Progressing    Problem: Stroke, Ischemic (Includes Transient Ischemic Attack)  Goal: Optimal Coping  8/12/2024 0530 by Iris Hall RN  Outcome: Progressing    Goal: Effective Bowel Elimination  8/12/2024 0530 by Iris Hall RN  Outcome: Progressing    Goal: Optimal Cerebral Tissue Perfusion  8/12/2024 0530 by Iris Hall RN  Outcome: Progressing    Goal: Optimal Cognitive Function  8/12/2024 0530 by Iris Hall RN  Outcome: Progressing    Goal: Improved Communication Skills  8/12/2024 0530 by Iris Hall RN  Outcome: Progressing    Goal: Optimal Functional Ability  8/12/2024 0530 by Iris Hall RN  Outcome: Progressing    Goal: Optimal Nutrition Intake  8/12/2024 0530 by Iris Hall RN  Outcome: Progressing    Goal: Effective Oxygenation and Ventilation  8/12/2024 0530 by Iris Hall RN  Outcome: Progressing    Goal: Improved Sensorimotor Function  8/12/2024 0530 by Iris Hall RN  Outcome: Progressing    Goal: Safe and Effective Swallow  8/12/2024 0530 by Iris Hall RN  Outcome: Progressing    Goal: Effective Urinary Elimination  8/12/2024 0530 by Iris Hall RN  Outcome: Progressing    Problem: Fall Injury Risk  Goal: Absence of Fall and Fall-Related Injury  8/12/2024 0530 by Iris Hall RN  Outcome: Progressing       Problem: Infection  Goal: Absence of Infection Signs and  Symptoms  8/12/2024 0530 by Iris Hall RN  Outcome: Progressing       Problem: Skin Injury Risk Increased  Goal: Skin Health and Integrity  8/12/2024 0530 by Iris Hall RN  Outcome: Progressing       Problem: Thrombolytic Therapy  Goal: Absence of Bleeding  8/12/2024 0530 by Iris Hall, RN  Outcome: Progressing    Goal: Unobstructed Breathing  8/12/2024 0530 by Iris Hall RN  Outcome: Progressing

## 2024-08-12 NOTE — PLAN OF CARE
Problem: SLP  Goal: SLP Goal  Description: 1. Given mod assist, pt will perform confrontational naming task at 90% accuracy.  2. Given mod assist, pt will follow 2-step verbal directions at 80% accuracy.  3. Given mod assist, pt will complete concrete divergent and convergent naming tasks at 80% accuracy.  4. Given mod assist, pt will respond to simple information questions at 90% accuracy.    Outcome: Met   Pt continues to exhibit some episodes of anomia and with automatic speech. Pt to be DC'd home this date with outpatient SLP services. Pt remains motivated to improve.

## 2024-08-12 NOTE — PLAN OF CARE
The sw met with the pt to complete the assessment. The pt lives with his parents in [place]. The pt list his father Eros Frank [phone] as his emergency contact. The pt's employed at [employer] as a . The pt's independent with his ADL's and doesn't use dme. The pt still drives but his father will transport him home at d/c. The pt leads a very active lifestyle. The sw completed the white board in the pt's room with her name and contact info. The sw gave the pt a d/c brochure with her contact info on it. The sw encouraged him to call if he has any further questions or concerns. The sw will continue to follow the pt throughout his transitions of care and will assist with any d/c needs.     [place] - Telemetry  Initial Discharge Assessment       Primary Care Provider: James Dawson MD    Admission Diagnosis: Aphasia [R47.01]  CVA (cerebral vascular accident) [I63.9]  Acute focal neurological deficit [R29.818]    Admission Date: 8/10/2024  Expected Discharge Date:     Transition of Care Barriers: None    Payor: BLUE CROSS BLUE SHIELD / Plan: BCBS ALL OUT OF STATE / Product Type: PPO /     Extended Emergency Contact Information  Primary Emergency Contact: Srinivasan Cullen   [place]  Mobile Phone: [phone]  Relation: Sister  Secondary Emergency Contact: Eros Frank  Mobile Phone: [phone]  Relation: Father   needed? Yes    Discharge Plan A: Other (OP [facility])  Discharge Plan B: Home Health      [id number] [place] - [address] AT [facility]  [address]  [place] [address]  Phone: [phone] Fax: [phone]      Initial Assessment (most recent)       Adult Discharge Assessment - 08/12/24 1052          Discharge Assessment    Assessment Type Discharge Planning Assessment     Confirmed/corrected address, phone number and insurance Yes     Confirmed Demographics Correct on Facesheet     Source of Information patient      When was your last doctors appointment? --   last year    Communicated RENZO with patient/caregiver Yes     Reason For Admission CVA due to occlusion of left middle cerebral artery     People in Home parent(s)     Do you expect to return to your current living situation? Yes     Do you have help at home or someone to help you manage your care at home? Yes     Who are your caregiver(s) and their phone number(s)? Eros Frank(father)[phone]     Prior to hospitilization cognitive status: Alert/Oriented     Current cognitive status: Alert/Oriented     Walking or Climbing Stairs Difficulty no     Dressing/Bathing Difficulty no     Home Accessibility wheelchair accessible     Home Layout Able to live on 1st floor     Equipment Currently Used at Home none     Readmission within 30 days? No     Patient currently being followed by outpatient case management? No     Do you currently have service(s) that help you manage your care at home? No     Do you take prescription medications? Yes     Do you have prescription coverage? Yes     Coverage BCBS     Do you have any problems affording any of your prescribed medications? No     Is the patient taking medications as prescribed? yes     Who is going to help you get home at discharge? Eros Frank(father)[phone]     How do you get to doctors appointments? car, drives self     Are you on dialysis? No     Do you take coumadin? No     Discharge Plan A Other   OP ST    Discharge Plan B Home Health     DME Needed Upon Discharge  none     Discharge Plan discussed with: Patient     Transition of Care Barriers None        Physical Activity    On average, how many days per week do you engage in moderate to strenuous exercise (like a brisk walk)? 0 days     On average, how many minutes do you engage in exercise at this level? 0 min        Financial Resource Strain    How hard is it for you to pay for the very basics like food, housing, medical care, and heating? Not hard  at all        Housing Stability    In the last 12 months, was there a time when you were not able to pay the mortgage or rent on time? No     At any time in the past 12 months, were you homeless or living in a shelter (including now)? No        Transportation Needs    Has the lack of transportation kept you from medical appointments, meetings, work or from getting things needed for daily living? No        Food Insecurity    Within the past 12 months, you worried that your food would run out before you got the money to buy more. Never true     Within the past 12 months, the food you bought just didn't last and you didn't have money to get more. Never true        Stress    Do you feel stress - tense, restless, nervous, or anxious, or unable to sleep at night because your mind is troubled all the time - these days? Only a little        Social Isolation    How often do you feel lonely or isolated from those around you?  Never        Alcohol Use    Q1: How often do you have a drink containing alcohol? Never     Q2: How many drinks containing alcohol do you have on a typical day when you are drinking? Patient does not drink     Q3: How often do you have six or more drinks on one occasion? Never        Utilities    In the past 12 months has the electric, gas, oil, or water company threatened to shut off services in your home? No        Health Literacy    How often do you need to have someone help you when you read instructions, pamphlets, or other written material from your doctor or pharmacy? Never        OTHER    Name(s) of People in Home Eros Zac(father)[phone] and his mother

## 2024-08-12 NOTE — PT/OT/SLP PROGRESS
"Speech Language Pathology Treatment    Patient Name:  Monster Crespo   MRN:  [id number]  Admitting Diagnosis: Cerebrovascular accident (CVA) due to occlusion of left middle cerebral artery    Recommendations:                 General Recommendations:  Speech/language therapy  Diet recommendations:  Regular Diet - IDDSI Level 7, Thin   Aspiration Precautions: 1 bite/sip at a time, Feed only when awake/alert, Frequent oral care, HOB to 90 degrees, Remain upright 30 minutes post meal, Small bites/sips, and Standard aspiration precautions   General Precautions: Standard, fall, aphasia  Communication strategies:  provide increased time to answer  Assessment:     Monster Crespo is a 54 y.o. male admitted with CVA who presents with expressive Aphasia.  Swallowing is WFL at this time.     Subjective     Pt had transferred from ICU to telemetry floor.   Patient goals: "Am I going home?"     Pain/Comfort:  Pain Rating 1: 0/10    Respiratory Status: room air     Objective:     Has the patient been evaluated by SLP for swallowing?   Yes  Keep patient NPO? No     Communication: Pt seen in room, pt was awake and alert. Pt remained participatory during session. Primary language is Egyptian but he speaks English as well. Pt able to follow simple commands with approx. 85%acc, some time needed to process but then able to execute. Pt able to point to room objects when named by SLP.   Pt able to respond to basic ?s with 1-3 words responses. Reports using the phone is harder since he has to think of the words. Pt completed confrontation naming tasks with 70%acc, needs cues for word searching and association. Pt had some breakdown during repetition of words and phrases and during automatic speech tasks including NASRIN, MALLIKA, and counting to 40.   Provided patient with some verbal and visual techniques to assist when he is stuck on a word or cannot retrieve it. Pt indicated good understanding.     Education: Pt educated on role of SLP, " recommendations for therapy, provided with techniques to assist when he is hesitant or cannot come up with the word. Explained to patient that he needs to give himself time when he is stuck on a word. Pt also noted to shift from Albanian language to English language when he is performing naming tasks. Pt remains motivated to improve on his speech. Pt aware of discharge this date. Pt did inquire about calling his mom back to arrange for ride upon DC. His mom had phoned earlier during session. Pt left upright in bed, cellphone in hand and call light within reach.     Goals:   Multidisciplinary Problems       SLP Goals       Not on file              Multidisciplinary Problems (Resolved)          Problem: SLP    Goal Priority Disciplines Outcome   SLP Goal   (Resolved)     SLP Met   Description: 1. Given mod assist, pt will perform confrontational naming task at 90% accuracy.  2. Given mod assist, pt will follow 2-step verbal directions at 80% accuracy.  3. Given mod assist, pt will complete concrete divergent and convergent naming tasks at 80% accuracy.  4. Given mod assist, pt will respond to simple information questions at 90% accuracy.                         Plan:       Plan of Care expires:  09/11/24  Plan of Care reviewed with:  patient   SLP Follow-Up:  No       Discharge recommendations:  Low Intensity Therapy (outpatient SLP)   Barriers to Discharge:  none    Time Tracking:     SLP Treatment Date:   08/12/24  Speech Start Time:  1034  Speech Stop Time:  1104     Speech Total Time (min):  30 min    Billable Minutes: Speech Therapy Individual 20 and Self Care/Home Management Training 10    08/12/2024

## 2024-08-12 NOTE — DISCHARGE SUMMARY
[facility] Medicine  Discharge Summary      Patient Name: Monster Crespo  MRN: [id number]  ALKA: [id number]  Patient Class: IP- Inpatient  Admission Date: 8/10/2024  Hospital Length of Stay: 2 days  Discharge Date and Time:  08/12/2024 11:51 AM  Attending Physician: Kaur Latif*   Discharging Provider: Ben Monson MD  Primary Care Provider: James Dawson MD    Primary Care Team: Networked reference to record PCT     HPI:   Monster Crespo is a 54M w/ PMHx of dyslipidemia who presented to [facility] ED via EMS for CC AMS. Per his son suddenly began having issues w/ speaking. LKW approx 1330 on 8/10/24. Since EMS arrival the patient has been alert and responsive, following commands but unable to speak. He answers questions with yes or no nod.    A code stroke was called and patient was evaluated by Vascular Neurology who recommended TNK administration which he accepted.   The patient remains unable to speak but nods and follows commands.    ED workup also significant for: CTA H/N shows suspected subtle loss of gray-white differentiation in left insular cortex which can be seen with acute infarct. Recommended MRI done, shows acute infarction in the left frontal lobe and insula.  Telestroke MD updated, awaiting further recommendations.   The patient is admitted to Hospital Medicine with neurology consult.     * No surgery found *      Hospital Course:    54-year-old male with a past medical history of dyslipidemia; presented to the emergency department due to difficulty speaking.  Stroke code was called and patient was evaluated by vascular neurosurgery who recommended TNK administration which he was accepted. CTA H/N shows suspected subtle loss of gray-white differentiation in left insular cortex which can be seen with acute infarct. Recommended MRI done, shows acute infarction in the left frontal lobe and insula.  The patient was admitted to ICU and telemetry monitor.  He was  seen by neurologist recommended aspirin, Plavix and high-dose statins.  The patient was also seen by PT/OT/speech therapy.  PT/OT cleared patient the speech recommends outpatient speech therapy.  Patient was counseled on adherence to medication use, diet and exercise.  He was examined in his bed today.  He denies chest pain, palpitation, shortness for breath, GI/ symptoms.  Has no loss of strength nor sensation.  Patient was speech appears to be improving.  He will be discharged today with strict instructions to follow up with his PCP within 3-5 days.             Goals of Care Treatment Preferences:  Code Status: Full Code      SDOH Screening:  The patient was screened for utility difficulties, food insecurity, transport difficulties, housing insecurity, and interpersonal safety and there were no concerns identified this admission.     Consults:   Consults (From admission, onward)          Status Ordering Provider     Inpatient consult to [facility]  Once        Provider:  (Not yet assigned)    Completed CHELA DAY     Inpatient consult to Physical Medicine Rehab  Once        Provider:  (Not yet assigned)    Acknowledged CHELA DAY     Inpatient consult to Registered Dietitian/Nutritionist  Once        Provider:  (Not yet assigned)    Completed CHELA DAY     IP consult to case management/social work  Once        Provider:  (Not yet assigned)    Completed CHELA DAY     Consult to Telemedicine - Acute Stroke  Once        Provider:  (Not yet assigned)    Acknowledged JORGE RIBERA            Neuro  * Cerebrovascular accident (CVA) due to occlusion of left middle cerebral artery  Acute infarct of L frontal lobe and insula suspected on CTA and confirmed on MRI.    Antithrombotics for secondary stroke prevention: Antiplatelets: None: Hold all Antithrombotics x 24 hours after IV Thrombolytic therapy administration    Statins for secondary stroke prevention and hyperlipidemia, if present:    Statins: Atorvastatin- 80 mg daily    Aggressive risk factor modification: None     Rehab efforts: The patient has been evaluated by a stroke team provider and the therapy needs have been fully considered based off the presenting complaints and exam findings. The following therapy evaluations are needed: PT evaluate and treat, OT evaluate and treat, SLP evaluate and treat, PM&R evaluate for appropriate placement    Diagnostics ordered/pending: CT scan of head without contrast to asses brain parenchyma, CTA Head to assess vasculature , CTA Neck/Arch to assess vasculature, HgbA1C to assess blood glucose levels, Lipid Profile to assess cholesterol levels, MRI head without contrast to assess brain parenchyma, TTE to assess cardiac function/status , TSH to assess thyroid function    VTE prophylaxis: Mechanical prophylaxis: Place SCDs  None: Reason for No Pharmacological VTE Prophylaxis: Holding x 24 hours s/p treatment with Thrombolytic therapy    BP parameters: Infarct: Post Thrombolytic therapy, SBP <180    -  Seen by PT/OT/ speech therapist    Other  Left Frontal Lobe and insula infarct  -  Presented with dysarthria  - out patient  speech therapist        Final Active Diagnoses:    Diagnosis Date Noted POA    PRINCIPAL PROBLEM:  Cerebrovascular accident (CVA) due to occlusion of left middle cerebral artery [I63.512] 08/10/2024 Yes    Left Frontal Lobe and insula infarct [I69.314] 08/11/2024 Not Applicable    Dyslipidemia [E78.5] 01/23/2014 Yes      Problems Resolved During this Admission:       Discharged Condition: stable    Disposition: Home or Self Care    Follow Up:    Patient Instructions:      Ambulatory referral/consult to Speech Therapy   Standing Status: Future   Referral Priority: Routine Referral Type: Speech Therapy   Referral Reason: Specialty Services Required   Requested Specialty: Speech Pathology   Number of Visits Requested: 1       Significant Diagnostic Studies: Labs: BMP:   Recent Labs   Lab  08/10/24  1558 08/11/24  0414 08/12/24  0332   GLU 95 98 117*    137 137   K 4.2 4.2 3.9    107 104   CO2 24 22* 25   BUN 24* 18 22*   CREATININE 1.0 0.9 1.0   CALCIUM 8.9 8.8 9.1   MG  --  2.3  --    , CMP   Recent Labs   Lab 08/10/24  1558 08/11/24  0414 08/12/24  0332    137 137   K 4.2 4.2 3.9    107 104   CO2 24 22* 25   GLU 95 98 117*   BUN 24* 18 22*   CREATININE 1.0 0.9 1.0   CALCIUM 8.9 8.8 9.1   PROT 6.8 6.8 7.2   ALBUMIN 3.6 3.7 3.9   BILITOT 0.2 0.4 0.8   ALKPHOS 66 63 72   AST 23 31 23   ALT 26 28 27   ANIONGAP 8 8 8   , CBC   Recent Labs   Lab 08/10/24  1459 08/11/24  0414 08/12/24  0333   WBC 6.91 9.89 11.64   HGB 13.8* 13.8* 14.7   HCT 41.1 41.2 43.3    213 266   , Lipid Panel   Lab Results   Component Value Date    CHOL 220 (H) 08/10/2024    HDL 33 (L) 08/10/2024    LDLCALC 133.0 08/10/2024    TRIG 270 (H) 08/10/2024    CHOLHDL 15.0 (L) 08/10/2024   , Troponin   Recent Labs   Lab 08/11/24 0414   TROPONINI <0.006   , A1C:   Recent Labs   Lab 08/10/24  1558   HGBA1C 5.6   , and All labs within the past 24 hours have been reviewed  Radiology: MRI: Brain  Cardiac Graphics: Echocardiogram: Transthoracic echo (TTE) complete (Cupid Only):   Results for orders placed or performed during the hospital encounter of 08/10/24   Echo Saline Bubble? Yes   Result Value Ref Range    BSA 1.88 m2    Walker's Biplane MOD Ejection Fraction 71 %    A2C EF 75 %    A4C EF 65 %    LVOT stroke volume 73.52 cm3    LVIDd 4.55 3.5 - 6.0 cm    LV Systolic Volume 21.44 mL    LV Systolic Volume Index 11.5 mL/m2    LVIDs 2.46 2.1 - 4.0 cm    LV ESV A2C 48.45 mL    LV Diastolic Volume 94.74 mL    LV ESV A4C 24.61 mL    LV Diastolic Volume Index 50.94 mL/m2    LV EDV A2C 34.748115854314763 mL    LV EDV A4C 49.26 mL    Left Ventricular End Systolic Volume by Teichholz Method 21.44 mL    Left Ventricular End Diastolic Volume by Teichholz Method 94.74 mL    IVS 0.93 0.6 - 1.1 cm    LVOT diameter 2.25 cm     LVOT area 4.0 cm2    FS 46 (A) 28 - 44 %    Left Ventricle Relative Wall Thickness 0.55 cm    Posterior Wall 1.26 (A) 0.6 - 1.1 cm    LV mass 176.97 g    LV Mass Index 95 g/m2    MV Peak E Alfonso 0.57 m/s    TDI LATERAL 0.08 m/s    TDI SEPTAL 0.07 m/s    E/E' ratio 7.60 m/s    MV Peak A Alfonso 0.57 m/s    TR Max Alfonso 0.90 m/s    E/A ratio 1.00     E wave deceleration time 283.97 msec    LV SEPTAL E/E' RATIO 8.14 m/s    LV LATERAL E/E' RATIO 7.13 m/s    PV Peak S Alfonso 0.52 m/s    PV Peak D Alfonso 0.51 m/s    Pulm vein S/D ratio 1.02     LVOT peak alfonso 1.00 m/s    Left Ventricular Outflow Tract Mean Velocity 0.71 cm/s    Left Ventricular Outflow Tract Mean Gradient 2.22 mmHg    RV- johns basal diam 3.3 cm    RV S' 12.93 cm/s    TAPSE 2.17 cm    LA size 3.82 cm    Left Atrium Minor Axis 5.88 cm    Left Atrium Major Axis 5.41 cm    LA volume (mod) 35.28 cm3    LA Volume Index (Mod) 19.0 mL/m2    RA Major Axis 4.59 cm    RA Width 3.32 cm    AV mean gradient 5 mmHg    AV peak gradient 10 mmHg    Ao peak alfonso 1.60 m/s    Ao VTI 28.50 cm    LVOT peak VTI 18.50 cm    AV valve area 2.58 cm²    AV Velocity Ratio 0.63     AV index (prosthetic) 0.65     AZAEL by Velocity Ratio 2.48 cm²    MV mean gradient 1 mmHg    MV peak gradient 2 mmHg    MV valve area by continuity eq 2.59 cm2    MV VTI 28.4 cm    Triscuspid Valve Regurgitation Peak Gradient 3 mmHg    PV PEAK VELOCITY 0.94 m/s    PV peak gradient 4 mmHg    Pulmonary Valve Mean Velocity 0.66 m/s    Sinus 3.34 cm    STJ 2.66 cm    Ascending aorta 2.78 cm    IVC diameter 1.20 cm    Mean e' 0.08 m/s    ZLVIDS -2.06     ZLVIDD -1.29     LA area A4C 13.15 cm2    LA area A2C 18.49 cm2    LA Volume Index 29.5 mL/m2    LA volume 54.89 cm3    LA WIDTH 3.0 cm    TV resting pulmonary artery pressure 6 mmHg    RV TB RVSP 4 mmHg    Est. RA pres 3 mmHg    Narrative      Left Ventricle: The left ventricle is normal in size. There is   concentric remodeling. There is normal systolic function. Biplane  (2D)   method of discs ejection fraction is 71%. There is normal diastolic   function.    Right Ventricle: Normal right ventricular cavity size. Systolic   function is normal. TAPSE is 2.17 cm.    Pulmonary Artery: The estimated pulmonary artery systolic pressure is 6   mmHg.    IVC/SVC: Normal venous pressure at 3 mmHg.    . Study is negative for shunt    Study appears to be negative for shunt.       Angiography:  CT angiogram head and neck    Pending Diagnostic Studies:       None           Medications:  Reconciled Home Medications:      Medication List        START taking these medications      aspirin 81 MG Chew  Take 1 tablet (81 mg total) by mouth once daily.  Start taking on: August 13, 2024     clopidogreL 75 mg tablet  Commonly known as: PLAVIX  Take 1 tablet (75 mg total) by mouth once daily.  Start taking on: August 13, 2024            CHANGE how you take these medications      atorvastatin 80 MG tablet  Commonly known as: LIPITOR  Take 1 tablet (80 mg total) by mouth every evening.  What changed:   medication strength  how much to take            CONTINUE taking these medications      doxepin 100 MG capsule  Commonly known as: SINEQUAN  TAKE 1 CAPSULE BY MOUTH EVERY DAY IN THE EVENING     EScitalopram oxalate 10 MG tablet  Commonly known as: LEXAPRO  TAKE 1 TABLET BY MOUTH EVERY DAY     pantoprazole 40 MG tablet  Commonly known as: PROTONIX  TAKE 1 TABLET BY MOUTH EVERY DAY BEFORE BREAKFAST              Indwelling Lines/Drains at time of discharge:   Lines/Drains/Airways       None                   Time spent on the discharge of patient: 35 minutes         Ben Monson MD  Department of Hospital Medicine  [place] [facility]

## 2024-08-12 NOTE — PLAN OF CARE
[facility] NEUROLOGY Plan of Care Note    Monster Crespo  1970  [id number]    Monster Crespo is a 54 y.o. year old male who for difficulty speaking.  Per his son, the patient suddenly began having issues with speaking while the patient was responsive.  LKW approx 1330 on 8/10/24.  Stroke activated at the ED.  TNK was given by vascular neurology and admitted to the ICU for further monitoring.   Work up showed an acute infarct to the left frontal lobe.  No large vessel occlusion.  No PFO.    Objective:  Vitals:    08/12/24 0800   BP:    Pulse: 67   Resp: 20   Temp:      Scheduled Meds:   aspirin  81 mg Oral Daily    atorvastatin  80 mg Oral QHS    clopidogreL  75 mg Oral Daily    mupirocin   Nasal BID     Laboratory Findings:   TSH: 2.195  A1c: 5.6  LDL: 133    Neuroimaging:   Echo Saline Bubble? Yes Result Date: 8/12/2024    Left Ventricle: The left ventricle is normal in size. There is concentric remodeling. There is normal systolic function. Biplane (2D) method of discs ejection fraction is 71%. There is normal diastolic function.   Right Ventricle: Normal right ventricular cavity size. Systolic function is normal. TAPSE is 2.17 cm.   Pulmonary Artery: The estimated pulmonary artery systolic pressure is 6 mmHg.   IVC/SVC: Normal venous pressure at 3 mmHg.   . Study is negative for shunt   Study appears to be negative for shunt.     CT Head Without Contrast Result Date: 8/11/2024  Acute/Subacute left frontal cortical infarct with local edema.  No detrimental change.  Recommend surveillance.     MRI Brain Without Contrast Result Date: 8/10/2024  Acute infarction in the left frontal lobe and insula. This report was flagged in Epic as abnormal.     CTA Head and Neck (xpd) Result Date: 8/10/2024  1. Unremarkable CTA head and neck.  No large vessel occlusion or high-grade stenosis. 2. Suspected subtle loss of gray-white differentiation in the left insular cortex, which can be seen with an acute infarct.  Further  evaluation with MRI brain is recommended. This report was flagged in Epic as abnormal.     CT Head Without Contrast Result Date: 8/10/2024  No acute intracranial process.  Additional evaluation, as clinically warranted.     Plan:     - start dual anti-platelet 24 hours post TNK administration (330pm)  - aspirin 81mg once daily  - plavix 75mg once daily for 21 days then stop  - lipitor 80mg once daily  - therapies  - outpatient follow up with vascular neurology at [facility]     Differential diagnosis was explained to the patient. All questions were answered. Patient understood and agreed to adhere to plan.     Socrates Carter MD  [facility] PGY-IV  [facility] Consult Service

## 2024-08-12 NOTE — HOSPITAL COURSE
54-year-old male with a past medical history of dyslipidemia; presented to the emergency department due to difficulty speaking.  Stroke code was called and patient was evaluated by vascular neurosurgery who recommended TNK administration which he was accepted. CTA H/N shows suspected subtle loss of gray-white differentiation in left insular cortex which can be seen with acute infarct. Recommended MRI done, shows acute infarction in the left frontal lobe and insula.  The patient was admitted to ICU and telemetry monitor.  He was seen by neurologist recommended aspirin, Plavix and high-dose statins.  The patient was also seen by PT/OT/speech therapy.  PT/OT cleared patient the speech recommends outpatient speech therapy.  Patient was counseled on adherence to medication use, diet and exercise.  He was examined in his bed today.  He denies chest pain, palpitation, shortness for breath, GI/ symptoms.  Has no loss of strength nor sensation.  Patient was speech appears to be improving.  He will be discharged today with strict instructions to follow up with his PCP within 3-5 days.

## 2024-08-13 ENCOUNTER — CLINICAL SUPPORT (OUTPATIENT)
Dept: REHABILITATION | Facility: HOSPITAL | Age: 54
End: 2024-08-13
Attending: INTERNAL MEDICINE
Payer: COMMERCIAL

## 2024-08-13 DIAGNOSIS — R41.841 COGNITIVE COMMUNICATION DEFICIT: ICD-10-CM

## 2024-08-13 DIAGNOSIS — R29.818 ACUTE FOCAL NEUROLOGICAL DEFICIT: ICD-10-CM

## 2024-08-13 DIAGNOSIS — I63.9 CVA (CEREBRAL VASCULAR ACCIDENT): ICD-10-CM

## 2024-08-13 DIAGNOSIS — R47.1 DYSARTHRIA: Primary | ICD-10-CM

## 2024-08-13 PROCEDURE — 92522 EVALUATE SPEECH PRODUCTION: CPT | Mod: PN

## 2024-08-13 PROCEDURE — 96125 COGNITIVE TEST BY HC PRO: CPT | Mod: PN

## 2024-08-13 NOTE — PLAN OF CARE
[facility]  Speech Therapy Evaluation -Neurological Rehabilitation    Date: 8/13/2024     Name: Monster Crespo   MRN: [id number]    Therapy Diagnosis:   Encounter Diagnoses   Name Primary?    Acute focal neurological deficit     CVA (cerebral vascular accident)     Dysarthria Yes    Cognitive communication deficit     Physician: Ben Monson MD  Physician Orders: Ambulatory Referral to Speech Therapy   Medical Diagnosis: Acute focal neurological deficit [R29.818], CVA (cerebral vascular accident) [I63.9]     Visit # / Visits Authorized:  1 / 1   Date of Evaluation:  8/13/2024   Insurance Authorization Period: 8/12/24 to 12/31/24  Plan of Care Certification:    8/13/2024 to 9/27/24      Time In:8:16   Time Out: 9:06  Total time: 50    Procedure   Cognitive Communication Evaluation including scoring and interpretation (Total time: 62 minutes)   Evaluation of Speech Sound Production     Precautions: Standard  Subjective   Date of Onset: 8/11/24  History of Current Condition:  Monster Crespo is a 54 y.o. male who presents to [facility] Outpatient Speech Therapy for evaluation secondary to CVA. Patient was referred to therapy by Ben Monson MD , which is the patient's hospitalist. Patient reports difficulty with speech and thinking. Difficulty getting words out. Mixing up [place] and English. Some changes in facial symmetry/labial.  Some words slurring sometimes.   Patient attended evaluation Independently.   Past Medical History: Monster Crespo  has a past medical history of GERD (gastroesophageal reflux disease) and Insomnia.  Monster Crespo  has no past surgical history on file.  Medical Hx and Allergies: Monster has a current medication list which includes the following prescription(s): aspirin, atorvastatin, clopidogrel, doxepin, escitalopram oxalate, and pantoprazole. 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   Swallowing: Denies coughing/throat clearing/changes in vocal quality while eating/drinking.   Prior Level of Function: no deficits in speech or cognition   Current Level of Function: mild articulation deficit and moderate cognitive deficit  Pain Scale: no pain indicated throughout session  Patient's Therapy Goals:  to improve speech and word finding  Objective   Formal Assessment:  Administered the Cognitive Linguistic Quick Test to assess relative status of 5 cognitive domains: attention, memory, language, executive functions and visuospatial skills. Results are as follows:      Task:  Score  Criterion Cut off Score    Personal Facts: evaluates memory and language  8 8   Symbol Cancellation: evaluates attention and visuospatial skills  12 11   Confrontational Naming: evaluates language  9 10   Clock Drawing: evaluates all cognitive domains  0 12   Story Retelling: evaluates attention, memory, language  4  6   Symbol Trails: evaluates attention, executive function, visuospatial skills  2  9   Generative Naming: evaluates memory, language, and executive functions  2  5   Design Memory: evaluates attention, memory and visuospatial skills  3 5   Mazes: evaluates attention, executive functions, and visuospatial skills  7 7   Design Generation: evaluates attention, executive functions, and visuospatial skills  4 6           Cognitive Domain Score  Score  Severity Rating    Attention: sustained, selective, executive   160 mild   Memory: procedural, working, semantic, episodic  112 moderate   Executive Functions: planning, sequencing, flexibility/non-routine  15 severe   Language: semantics, morphology, syntax, pragmatics  22 moderate   Visuospatial Skills: visual perception and construction  65 mild   Clock Drawing: screens for neurological dysfunction;  looks at all 5 domains  0  severe       Pt presents with an overall composite severity rating of 2.2 which indicates a moderate cognitive deficit.  Patient demonstrated difficulty with immediate recall, naming, story retell, symbol trails, design generation. Patient was unable to write the numbers on a clock or indicate the correct time. Naming specific category or specific letter category members was challenging.     Patient did well answering personal orientation questions, symbol cancellation, named 9/10 objects.      Oral Motor Exam:    Mandibular Strength and Mobility - Trigeminal Nerve (CNV) Rest: grossly symmetrical   Lateralization: WFL  Protrusion: WFL  Retraction:  WFL  Involuntary Movement: absent    Oral Labial Strength and Mobility -Facial Nerve (CN VII)  Rest: slightly asymmetrical; left labial weakness; slightly imprecise articulation    Lateralization: WFL  Protrusion: WFL  Retraction:  WFL  Involuntary Movement: absent    Lingual Strength and Mobility- Hypoglossal Nerve (CN XII)  Rest: grossly symmetrical   Lateralization: WFL  Protrusion: WFL  Retraction:  WFL  Elevation: WFL   Involuntary Movement: absent    Velar Elevation - Glossopharyngeal Nerve (CN IX) and Vagus (CN X) Rest: grossly symmetrical    Elevation: WFL  Sustained: WFL  Involuntary Movement: absent    Buccal Strength and Mobility - Facial Nerve (CN VII)  WFL    Facial Sensation and Movement - Facial Nerve (CN VII) Symmetrical at rest: Yes  Wrinkle forehead: Yes  Able to close eyes tightly: Yes  Taste to Anterior 2/3 of Tongue: Yes     Structure Abnormalities: No  Dentition: 1 missing tooth back left  Secretion Management: adequate  Mucosal Quality: adequate  Volitional Cough: perceptually strong  Volitional Swallow: able to palpate laryngeal rise  Voice Prior to PO Intake: clear    Respiration / Phonation:   # of reps/ 5s Norms/ # reps per second Maximum Phon. Time (ah) Words Per Minute:   P^ 23 5.0-7.1 4.6  Trial 1: 14    DNT  "  #words/  minute   T^ 25  4.8-7.1 5.0  Trial 2: 16 >125 = WFL    K^ 26  4.4-7.4 5.2   <125 = refer for AAC eval   P^T^K^ 9  3.6-7.5 1.8  Avg: 15       Norm: 160-170  (paragraph reading)       Norm (F 10-26, M 13-34.6) Norm: 150 to 250 (norm conversation)   Speech "doesn't feel right." Mild imprecise articulation noted.      Phonation Oral Reading Conversation   Stimulus Sustained ah:    Singing up scale:    Counting 1 to highest # on one breath: [place]  DNT History and Conversation   Quality clear  clear   Duration  15 seconds   N/a   Loudness  WFL  WFL   Steadiness WFL  WFL     Overall Speech Intelligibility: good     Awareness / Strategy Use:   Pt demonstrates adequate awareness of motor speech impairment. Pt was able to use strategies to improve intelligibility with minimal cues. Strategies introduced included: slow and over articulate.    Treatment   Total Treatment Time Separate from Evaluation: not applicable   No treatment performed secondary to time to complete evaluation.     Education provided:   -role of Speech Therapy, goals/plan of care, scheduling/cancellations, insurance limitations with patient  -Additional Education provided:   Motor speech strategies  Memory strategies    Patient expressed understanding.     Home Program: Labial exercises and memory strategies. Additional to be established next session.   Assessment     Monster presents to [facility] status post medical diagnosis of Acute focal neurological deficit; CVA.     Interpretation of objective assessment:   He presents with mild articulation errors in conversation as well as moderate cognitive deficits.  Patient with an overall composite severity rating of 2.2 which indicates a moderate cognitive deficit.  Patient demonstrated difficulty with immediate recall, naming, story retell, symbol trails, design generation.    Demonstrates impairments including limitations as described in the problem list. "     Positive prognostic factors: patient motivation  Negative prognostic factors: none  Barriers to therapy: No barriers to therapy identified.     Patient's spiritual, cultural, and educational needs considered and patient agreeable to plan of care and goals.    Patient will benefit from skilled therapy.    Rehab Potential: good    Short Term Goals: (4 weeks) Current Progress:   Patient will recall and utilize speech intelligibility strategies to improve articulation.     Progressing/ Not Met 8/13/2024   Established this date   2. Patient will recall and utilize word finding and memory strategies with min A to improve word finding and memory skills in daily life.      Progressing/ Not Met 8/13/2024   Established this date   3. Patient will recall functional information with 80% acc given min A.      Progressing/ Not Met 8/13/2024   Established this date    4. Patient will name concrete category members x 10 in 60 seconds to improve word finding skills.      Progressing/ Not Met 8/13/2024   Established this date    5. Patient will follow multi step directions with 90% acc given min A to improve daily function.      Progressing/ Not Met 8/13/2024   Established this date    6. Patient will recall information from a picture with 80% acc given min A.      Progressing/ Not Met 8/13/2024   Established this date    7. Patient will describe a picture with 80% acc given min A to improve word finding skills.      Progressing/ Not Met 8/13/2024   Established this date        Long Term Goals: (8 weeks) Current Progress:   He will use appropriate memory strategies to schedule and recall weekly activities, express needs and recall names to maintain safety and participate socially in functional living environment.     Established this date     Pt will demonstrate use of executive functions during daily living activities to improve safety and awareness in functional living environment. Established this date       Plan      Recommended Treatment Plan:  Patient will participate in the [facility] rehabilitation program for speech therapy 2 times per week for 6 weeks to address his Cognition and Motor Speech deficits, to educate patient and their family, and to participate in a home exercise program.    Other Recommendations:   None at this time    Therapist's Name:   Yani Marroquin CCC-SLP   8/13/2024

## 2024-08-14 ENCOUNTER — PATIENT OUTREACH (OUTPATIENT)
Dept: ADMINISTRATIVE | Facility: CLINIC | Age: 54
End: 2024-08-14
Payer: COMMERCIAL

## 2024-08-14 NOTE — PROGRESS NOTES
C3 nurse spoke with Monster Crespo  for a TCC post hospital discharge follow up call. The patient has a scheduled HOSFU appointment with JULEE Knight  on 08/20/2024 @ 1030.    Message sent to pcp staff

## 2024-08-19 ENCOUNTER — CLINICAL SUPPORT (OUTPATIENT)
Dept: REHABILITATION | Facility: HOSPITAL | Age: 54
End: 2024-08-19
Payer: COMMERCIAL

## 2024-08-19 DIAGNOSIS — R47.1 DYSARTHRIA: ICD-10-CM

## 2024-08-19 DIAGNOSIS — R41.841 COGNITIVE COMMUNICATION DEFICIT: Primary | ICD-10-CM

## 2024-08-19 PROCEDURE — 92507 TX SP LANG VOICE COMM INDIV: CPT | Mod: PN

## 2024-08-19 NOTE — PROGRESS NOTES
[facility]  Speech Therapy Treatment Note- Neurological Rehabilitation  Date: 8/19/2024     Name: Monster Crespo   MRN: [id number]   Therapy Diagnosis:   Encounter Diagnoses   Name Primary?    Cognitive communication deficit Yes    Dysarthria    Physician: Ben Monson MD  Physician Orders: Ambulatory Referral to Speech Therapy   Medical Diagnosis: R29.818 (ICD-10-CM) - Acute focal neurological deficit I63.9 (ICD-10-CM) - CVA (cerebral vascular accident)    Visit #/ Visits Authorized: 1/10  Date of Evaluation:  8/13/24  Insurance Authorization Period: 8/13/24-12/31/24  Plan of Care Expiration Date:    9/27/24  Extended Plan of Care:  n/a   Progress Note: 9/13/24     Time In:  1350  Time Out:  1420  Total Billable Time: 30 minutes (had to leave for work at 3pm)     Precautions: Cognition and Communication  Subjective:   Patient reports: has work at 3pm so session was ended early to accommodate his schedule so he wouldn't be late  He was not compliant to home exercise program. Not initiated yet  Response to previous treatment: good  Pain Scale: 0/10 on a Visual Analog Scale currently.  Pain Location: n/a  Objective:   TIMED  Procedure Min.   Cognitive Therapeutic Interventions, first 15 minutes CPT 85950  0   Cognitive Therapeutic Interventions, each additional 15 minutes CPT 16274 0         UNTIMED  Procedure   Speech- Language- Voice Therapy     Short Term Goals: (4 weeks) Current Progress:   Patient will recall and utilize speech intelligibility strategies to improve articulation.      Progressing/ Not Met 8/19/2024 Speech-language pathologist instructed patient in use of speech intelligibility strategies with the acronym SLAP (slow, loud, articulate, pause). He reviewed these and were visually presented to him for home review   2. Patient will recall and utilize word finding and memory strategies with min A to improve word finding and memory skills in daily life.       Progressing/ Not Met  8/19/2024  Speech-language pathologist instructed patient in use of writing information down (not currently doing), repeating information aloud, and making a mental picture/visualization to recall information. He verbalized understanding and was provided with written information to review.   3. Patient will recall functional information with 80% acc given min A.       Progressing/ Not Met 8/19/2024 Recalled clinic phone number with 5 minute delay with 100% accuracy; however, had difficulty with additional 5 minute delay recalling 80% of the numbers independently    4. Patient will name concrete category members x 10 in 60 seconds to improve word finding skills.       Progressing/ Not Met 8/19/2024 Animals - 7 independently, 17 with assistance  Foods - 7 independently, 18 with visualization strategy  Things you can find in a kitchen - 3 independently, 10 with assistance     Things in the bathroom (patient wrote these without time constraint per request) - only listed 2 independently    5. Patient will follow multi step directions with 90% acc given min A to improve daily function.       Progressing/ Not Met 8/19/2024 Did not address today   6. Patient will recall information from a picture with 80% acc given min A.       Progressing/ Not Met 8/19/2024 Did not address today   7. Patient will describe a picture with 80% acc given min A to improve word finding skills.       Progressing/ Not Met 8/19/2024 Described Western Aphasia Battery-Revised (WAB-R) picnic scene with production of 5+ sentences which were grammatically correct.    Verbs cards - completed with good ability for 100% of the information requested.      Patient complained of difficulties with writing today - specifically words that he knew before in english. Wrote single monosyllabic words to dictation with 80% accuracy independently.      Long Term Goals: (8 weeks) Current Progress:   He will use appropriate memory strategies to schedule and recall  weekly activities, express needs and recall names to maintain safety and participate socially in functional living environment.       ongoing   Pt will demonstrate use of executive functions during daily living activities to improve safety and awareness in functional living environment. ongoing     Patient Education/Response:   Patient educated regarding the followin. Speech intelligibility strategies  2. Memory strategies for daily use  3. Return to work suggestions - speak to supervisor regarding information he may need to write    Home program established: yes-review strategies and clinic phone number  Patient verbalized understanding to all above education provided.     See Electronic Medical Record under Patient Instructions for exercises provided throughout therapy.  Assessment:   Initial treatment session was completed today focusing on knowledge and use of speech enhancing strategies and memory strategies for use in daily living contexts. He completed various speech-language tasks focusing on word finding specifically for concrete words, as well as generation of category members for everyday items. He continues to have difficulty with spelling which was not present before his stroke. Additionally, he was instructed to recall the clinic phone number in case of needing to reschedule or cancel an appointment, which he recalled with assistance on average of 3 trials. He mentioned returning to work for the first time today since his stroke, which speech-language pathologist encouraged him to speak with his supervisor regarding his current limitations of reading/writing as compared to prior to his stroke. Monster is progressing well towards his goals. Current goals remain appropriate. Goals to be updated as necessary.     Patient prognosis is Good. Patient will continue to benefit from skilled outpatient speech and language therapy to address the deficits listed in the problem list on initial evaluation,  provide patient/family education and to maximize patient's level of independence in the home and community environment.   Medical necessity is demonstrated by the following IMPAIRMENTS:  Cognition: Deficits in executive functioning, attention, and memory prevent the pt from relaying medically and safety relevant information in a timely manner in a state of emergency.   Motor speech: Decreased speech intelligibility results in decreased efficiency communicating medical and social wants and needs in the case of emergency.    Barriers to Therapy: none  Patient's spiritual, cultural and educational needs considered and patient agreeable to plan of care and goals.  Plan:   Continue Plan of Care with focus on rehabilitation and compensation for cognitive-communicative impairment and dysarthria    Radha Jean M.S., L-SLP,CCC-SLP, CBIS  Speech-Language Pathologist  Certified Brain Injury Specialist  8/19/2024

## 2024-08-20 ENCOUNTER — OFFICE VISIT (OUTPATIENT)
Dept: PRIMARY CARE CLINIC | Facility: CLINIC | Age: 54
End: 2024-08-20
Payer: COMMERCIAL

## 2024-08-20 VITALS
WEIGHT: 166.88 LBS | HEART RATE: 83 BPM | SYSTOLIC BLOOD PRESSURE: 115 MMHG | DIASTOLIC BLOOD PRESSURE: 75 MMHG | OXYGEN SATURATION: 97 % | BODY MASS INDEX: 26.19 KG/M2 | HEIGHT: 67 IN

## 2024-08-20 DIAGNOSIS — I63.9 CEREBROVASCULAR ACCIDENT (CVA), UNSPECIFIED MECHANISM: ICD-10-CM

## 2024-08-20 DIAGNOSIS — I63.512 CEREBROVASCULAR ACCIDENT (CVA) DUE TO OCCLUSION OF LEFT MIDDLE CEREBRAL ARTERY: Primary | ICD-10-CM

## 2024-08-20 DIAGNOSIS — G47.30 SLEEP APNEA, UNSPECIFIED TYPE: ICD-10-CM

## 2024-08-20 PROCEDURE — 99999 PR PBB SHADOW E&M-EST. PATIENT-LVL IV: CPT | Mod: PBBFAC,,, | Performed by: INTERNAL MEDICINE

## 2024-08-20 RX ORDER — ATORVASTATIN CALCIUM 80 MG/1
80 TABLET, FILM COATED ORAL NIGHTLY
Qty: 90 TABLET | Refills: 3 | Status: SHIPPED | OUTPATIENT
Start: 2024-08-20

## 2024-08-20 RX ORDER — NAPROXEN SODIUM 220 MG/1
81 TABLET, FILM COATED ORAL DAILY
Qty: 90 TABLET | Refills: 3 | Status: SHIPPED | OUTPATIENT
Start: 2024-08-20

## 2024-08-20 NOTE — PROGRESS NOTES
"[facility]  Speech Therapy Treatment Note- Neurological Rehabilitation  Date: 8/21/2024     Name: Monster Crespo   MRN: [id number]   Therapy Diagnosis:   Encounter Diagnoses   Name Primary?    Dysarthria Yes    Cognitive communication deficit      Physician: Ben Monson MD  Physician Orders: Ambulatory Referral to Speech Therapy   Medical Diagnosis: R29.818 (ICD-10-CM) - Acute focal neurological deficit I63.9 (ICD-10-CM) - CVA (cerebral vascular accident)    Visit #/ Visits Authorized: 2/10  Date of Evaluation:  8/13/24  Insurance Authorization Period: 8/13/24-12/31/24  Plan of Care Expiration Date:    9/27/24  Extended Plan of Care:  n/a   Progress Note: 9/13/24     Time In:  1301  Time Out:  1345  Total Billable Time:  44 minutes    Precautions: Cognition and Communication  Subjective:   Patient reports: "not  too good" when asked about returning to work Monday; each day improving however  He was not compliant to home exercise program. Not initiated yet  Response to previous treatment: good  Pain Scale: 0/10 on a Visual Analog Scale currently.  Pain Location: n/a  Objective:   TIMED  Procedure Min.   Cognitive Therapeutic Interventions, first 15 minutes CPT 54195  0   Cognitive Therapeutic Interventions, each additional 15 minutes CPT 29356 0         UNTIMED  Procedure   Speech- Language- Voice Therapy     Short Term Goals: (4 weeks) Current Progress:   Patient will recall and utilize speech intelligibility strategies to improve articulation.      Progressing/ Not Met 8/21/2024 Patient independently recalled the following:   Slow (1/4). With acronym provided, he recalled articulate.     Reviewed acronym SLAP (slow, loud, articulate, pause) with verbalized understanding.   2. Patient will recall and utilize word finding and memory strategies with min A to improve word finding and memory skills in daily life.       Progressing/ Not Met 8/21/2024  Initially did not recall strategies. " Speech-language pathologist instructed patient in use of writing information down, then he recalled repeating information aloud, and prompted for making a mental picture/visualization to recall information.    3. Patient will recall functional information with 80% acc given min A.       Progressing/ Not Met 8/21/2024 Reviewed clinic phone number again today - recalled 80% of the digits initially and then with 10 minute filled delay, recalled with 80% accuracy independently    4. Patient will name concrete category members x 10 in 60 seconds to improve word finding skills.       Progressing/ Not Met 8/21/2024 Did not address today   5. Patient will follow multi step directions with 90% acc given min A to improve daily function.       Progressing/ Not Met 8/21/2024 Followed 2 step body and spatial instructions with 90% accuracy independently, increasing to 100% with 1 prompt for 1 trial    [employer] Therapy Advanced Comprehension Level 4 (2 objects) with 80% accuracy independently on first attempt. Repetition for 4 trials. Completed same activity once he was familiar and completed with 80% accuracy independently with repetition for 3 trials.   6. Patient will recall information from a picture with 80% acc given min A.       Progressing/ Not Met 8/21/2024 Did not address today   7. Patient will describe a picture with 80% acc given min A to improve word finding skills.       Progressing/ Not Met 8/21/2024 Described 5 pictures today with basic subject, verb, object present with 100% accuracy independently. Will complete for 1 additional session.   8. Patient will spell common words/objects with 80% accuracy and 1 prompt to enhance his spelling for work tasks.  New Goal 8/21/2024 [employer] Therapy Naming - anagram tiles for spelling common objects with 20% accuracy independently. Focused on [place] and english spellings today with accuracy more so in [place] than english     Long Term Goals: (8 weeks) Current Progress:   He  will use appropriate memory strategies to schedule and recall weekly activities, express needs and recall names to maintain safety and participate socially in functional living environment.       ongoing   Pt will demonstrate use of executive functions during daily living activities to improve safety and awareness in functional living environment. ongoing     Patient Education/Response:   Patient educated regarding the followin. Speech intelligibility strategies  2. Memory strategies for daily use  3. Return to work suggestions - speak to supervisor regarding information he may need to write    Home program established: yes-review strategies and clinic phone number  Patient verbalized understanding to all above education provided.     See Electronic Medical Record under Patient Instructions for exercises provided throughout therapy.  Assessment:   Treatment session today focused on knowledge and use of speech enhancing strategies and memory strategies for use in daily living contexts. He completed various speech-language tasks focusing on word finding specifically for concrete words, as well as spelling common objects with assistance. He has completed two work shifts with some impairments noted with reading/spelling but feels this has not greatly impacted his work performance. He is most interested in enhancing his spelling skills which were not previously impaired prior to his stroke. Monster is progressing well towards his goals. Current goals remain appropriate. Goals to be updated as necessary.     Patient prognosis is Good. Patient will continue to benefit from skilled outpatient speech and language therapy to address the deficits listed in the problem list on initial evaluation, provide patient/family education and to maximize patient's level of independence in the home and community environment.   Medical necessity is demonstrated by the following IMPAIRMENTS:  Cognition: Deficits in executive  functioning, attention, and memory prevent the pt from relaying medically and safety relevant information in a timely manner in a state of emergency.   Motor speech: Decreased speech intelligibility results in decreased efficiency communicating medical and social wants and needs in the case of emergency.    Barriers to Therapy: none  Patient's spiritual, cultural and educational needs considered and patient agreeable to plan of care and goals.  Plan:   Continue Plan of Care with focus on rehabilitation and compensation for cognitive-communicative impairment and dysarthria    Radha Jean M.S., L-SLP,CCC-SLP, CBIS  Speech-Language Pathologist  Certified Brain Injury Specialist  8/21/2024

## 2024-08-20 NOTE — PROGRESS NOTES
[facility]   New Visit Progress Note   Recent Hospital Discharge     PRESENTING HISTORY     Chief Complaint/Reason for Admission:  Follow up Hospital Discharge   PCP: James Dawson MD    History of Present Illness:  Mr. Monster Crespo is a 54 y.o. male who was recently admitted to the hospital.    [facility] Medicine  Discharge Summary        Patient Name: Monster Crespo  MRN: [id number]  ALKA: [id number]  Patient Class: IP- Inpatient  Admission Date: 8/10/2024  Hospital Length of Stay: 2 days  Discharge Date and Time:  08/12/2024 11:51 AM  Attending Physician: Kaur Latif   Discharging Provider: Ben Monson MD  Primary Care Provider: James Dawson MD  ___________________________________________________________________    Today:  [facility] to [facility] for initial hospital follow up.  Recently hospitalized for management of acute CVA.  Presented to ED with sudden onset dysarthria and expressive aphasia.  Stroke CODE activated.  TKN administered.   Admitted to [facility] Medicine service in ICU level of care.  Neurology consulted.   MRI brain significant for acute infarct  in left frontal lobe and insula.   CTA head/neck without large vessel occlusion or high grade stenosis.   TTE NEG for shunt.   Patient evaluated by therapy teams- cleared for regular consistency diet with think liquids.  No additional PT/OT indicated.   Neurology team recommended dual anti platelet therapy x 21 days followed by Aspirin monotherapy long term, along with long term statin therapy.  Neurology team also recommended loop recorder.   Patient discharged to home.     Patient unaccompanied today.  Ambulatory and independent with ADL's.  Brought all medication bottles for review and reports compliance.  Some residual expressive aphasia and word finding difficulty but improving with speech therapy.  He has returned to work for [employer] and has not had difficulty resuming  his duties.   Non smoker.  Drinks EtOH socially.      Review of Systems  General ROS: negative for chills, fever or weight loss  Psychological ROS: negative for hallucination, depression or suicidal ideation  Ophthalmic ROS: negative for blurry vision, photophobia or eye pain  ENT ROS: negative for epistaxis, sore throat or rhinorrhea  Respiratory ROS: no cough, shortness of breath, or wheezing  Cardiovascular ROS: no chest pain or dyspnea on exertion  Gastrointestinal ROS: no abdominal pain, change in bowel habits, or black/ bloody stools  Genito-Urinary ROS: no dysuria, trouble voiding, or hematuria  Musculoskeletal ROS: negative for gait disturbance or muscular weakness  Neurological ROS: no syncope or seizures; no ataxia  + mild word finding difficulty   Dermatological ROS: negative for pruritis, rash and jaundice      PAST HISTORY:     Past Medical History:   Diagnosis Date    GERD (gastroesophageal reflux disease)     Insomnia        No past surgical history on file.    Family History   Problem Relation Name Age of Onset    Hypertension Mother      Amblyopia Neg Hx      Blindness Neg Hx      Cataracts Neg Hx      Glaucoma Neg Hx      Macular degeneration Neg Hx      Retinal detachment Neg Hx      Strabismus Neg Hx           MEDICATIONS & ALLERGIES:     Current Outpatient Medications on File Prior to Visit   Medication Sig Dispense Refill    aspirin 81 MG Chew Take 1 tablet (81 mg total) by mouth once daily. 30 tablet 0    atorvastatin (LIPITOR) 80 MG tablet Take 1 tablet (80 mg total) by mouth every evening. 30 tablet 0    clopidogreL (PLAVIX) 75 mg tablet Take 1 tablet (75 mg total) by mouth once daily. 30 tablet 0    doxepin (SINEQUAN) 100 MG capsule TAKE 1 CAPSULE BY MOUTH EVERY DAY IN THE EVENING 90 capsule 3    EScitalopram oxalate (LEXAPRO) 10 MG tablet TAKE 1 TABLET BY MOUTH EVERY DAY (Patient not taking: Reported on 8/14/2024) 90 tablet 3    pantoprazole (PROTONIX) 40 MG tablet TAKE 1 TABLET BY MOUTH  "EVERY DAY BEFORE BREAKFAST 30 tablet 0     No current facility-administered medications on file prior to visit.        Review of patient's allergies indicates:  No Known Allergies    OBJECTIVE:     Vital Signs:  /75 (BP Location: Left arm, Patient Position: Sitting, BP Method: Large (Automatic))   Pulse 83   Ht 5' 7" (1.702 m)   Wt 75.7 kg (166 lb 14.2 oz)   SpO2 97%   BMI 26.14 kg/m²   Wt Readings from Last 3 Encounters:   08/12/24 1030 79.9 kg (176 lb 2.4 oz)   08/12/24 0815 75 kg (165 lb 5.5 oz)   08/10/24 2352 75 kg (165 lb 5.5 oz)   08/10/24 1438 74.8 kg (164 lb 14.5 oz)   03/27/23 1041 74.7 kg (164 lb 10.9 oz)   02/03/23 0918 78.1 kg (172 lb 2.9 oz)     Body mass index is 26.14 kg/m².        Physical Exam:  /75 (BP Location: Left arm, Patient Position: Sitting, BP Method: Large (Automatic))   Pulse 83   Ht 5' 7" (1.702 m)   Wt 75.7 kg (166 lb 14.2 oz)   SpO2 97%   BMI 26.14 kg/m²   General appearance: alert, cooperative, no distress  Constitutional:Oriented to person, place, and time  + appears well-developed and well-nourished.   HEENT: Normocephalic, atraumatic, neck symmetrical, no nasal discharge   Eyes: conjunctivae/corneas clear, PERRL, EOM's intact  Lungs: clear to auscultation bilaterally, no dullness to percussion bilaterally  Heart: regular rate and rhythm without rub; no displacement of the PMI   Abdomen: soft, non-tender; bowel sounds normoactive; no organomegaly  Extremities: extremities symmetric; no clubbing, cyanosis, or edema  Integument: Skin color, texture, turgor normal; no rashes; hair distrubution normal  Neurologic: Alert and oriented X 3, normal strength, normal coordination and gait  Psychiatric: no pressured speech; normal affect; no evidence of impaired cognition     Laboratory  Lab Results   Component Value Date    WBC 11.64 08/12/2024    HGB 14.7 08/12/2024    HCT 43.3 08/12/2024    MCV 86 08/12/2024     08/12/2024     BMP  Lab Results   Component Value " Date     08/12/2024    K 3.9 08/12/2024     08/12/2024    CO2 25 08/12/2024    BUN 22 (H) 08/12/2024    CREATININE 1.0 08/12/2024    CALCIUM 9.1 08/12/2024    ANIONGAP 8 08/12/2024    EGFRNORACEVR >60 08/12/2024     Lab Results   Component Value Date    ALT 27 08/12/2024    AST 23 08/12/2024    ALKPHOS 72 08/12/2024    BILITOT 0.8 08/12/2024     Lab Results   Component Value Date    INR 1.1 08/11/2024    INR 1.2 08/10/2024     Lab Results   Component Value Date    HGBA1C 5.6 08/10/2024       Diagnostic Results:    TTE 8/10/24:    Left Ventricle: The left ventricle is normal in size. There is concentric remodeling. There is normal systolic function. Biplane (2D) method of discs ejection fraction is 71%. There is normal diastolic function.    Right Ventricle: Normal right ventricular cavity size. Systolic function is normal. TAPSE is 2.17 cm.    Pulmonary Artery: The estimated pulmonary artery systolic pressure is 6 mmHg.    IVC/SVC: Normal venous pressure at 3 mmHg.    . Study is negative for shunt    Study appears to be negative for shunt.    MRI Brain 8/10/24:  Acute infarction in the left frontal lobe and insula.     CTA Head/Neck 8/10/24:  1. Unremarkable CTA head and neck.  No large vessel occlusion or high-grade stenosis.  2. Suspected subtle loss of gray-white differentiation in the left insular cortex, which can be seen with an acute infarct.  Further evaluation with MRI brain is recommended.      TRANSITION OF CARE:     [facility] Contact Note: 8/14/24     Family and/or Caretaker present at visit?  No.  Diagnostic tests reviewed/disposition: I have reviewed all completed as well as pending diagnostic tests at the time of discharge.  Disease/illness education: Yes  Home health/community services discussion/referrals: Patient does not have home health established from hospital visit.  They do not need home health.  If needed, we will set up home health for the patient.   Establishment or  re-establishment of referral orders for community resources: No other necessary community resources.   Discussion with other health care providers: No discussion with other health care providers necessary.     ASSESSMENT & PLAN:     Cerebrovascular accident (CVA), unspecified mechanism  - recent hospitalization as above  - continue Plavix/Aspirin x 21 days then aspirin monotherapy long term  - continue Atorvastatin long term  - continue outpatient speech therapy  - see Neurology team 9/25/24  - I will defer to Neurology team regarding loop recorder   -     aspirin 81 MG Chew; Take 1 tablet (81 mg total) by mouth once daily.  Dispense: 90 tablet; Refill: 3  -     atorvastatin (LIPITOR) 80 MG tablet; Take 1 tablet (80 mg total) by mouth every evening.  Dispense: 90 tablet; Refill: 3    Sleep apnea, unspecified type  - lost to follow up in 2017 before completing sleep study, but team with high concern for sleep apnea  - see sleep medicine team 1/13/25   -     Ambulatory referral/consult to Sleep Disorders; Future; Expected date: 08/27/2024    Patient will be released from hospital follow up clinic.  He will see his PCP, Dr Brenner, 8/26/24.     Instructions for the patient:      Scheduled Follow-up :  Future Appointments   Date Time Provider Department Center   8/21/2024  1:00 PM Radha Jean CCC-SLP [facility]   8/26/2024  2:00 PM James Dawson MD [facility]   8/28/2024  1:00 PM Radha Jean CCC-SLP [facility] [place] [facility]   9/4/2024  1:45 PM Radha Jean CCC-SLP [facility] [place] [facility]   9/6/2024  1:00 PM Radha Jean CCC-SLP [facility]   9/25/2024  1:00 PM Lani Lyon MD [id number]   1/13/2025  9:00 AM Pricila Interiano MD [facility] SLEEP [facility]       Post Visit Medication List:     Medication List            Accurate as of August 20, 2024 10:04 AM. If you have any questions, ask your nurse or doctor.                 CONTINUE taking these medications      aspirin 81 MG Chew  Take 1 tablet (81 mg total) by mouth once daily.     atorvastatin 80 MG tablet  Commonly known as: LIPITOR  Take 1 tablet (80 mg total) by mouth every evening.     clopidogreL 75 mg tablet  Commonly known as: PLAVIX  Take 1 tablet (75 mg total) by mouth once daily.     doxepin 100 MG capsule  Commonly known as: SINEQUAN  TAKE 1 CAPSULE BY MOUTH EVERY DAY IN THE EVENING     EScitalopram oxalate 10 MG tablet  Commonly known as: LEXAPRO  TAKE 1 TABLET BY MOUTH EVERY DAY     pantoprazole 40 MG tablet  Commonly known as: PROTONIX  TAKE 1 TABLET BY MOUTH EVERY DAY BEFORE BREAKFAST              Signing Physician:  Shruthi Nguyen MD

## 2024-08-21 ENCOUNTER — CLINICAL SUPPORT (OUTPATIENT)
Dept: REHABILITATION | Facility: HOSPITAL | Age: 54
End: 2024-08-21
Payer: COMMERCIAL

## 2024-08-21 DIAGNOSIS — R47.1 DYSARTHRIA: Primary | ICD-10-CM

## 2024-08-21 DIAGNOSIS — R41.841 COGNITIVE COMMUNICATION DEFICIT: ICD-10-CM

## 2024-08-21 PROCEDURE — 92507 TX SP LANG VOICE COMM INDIV: CPT | Mod: PN

## 2024-08-26 ENCOUNTER — OFFICE VISIT (OUTPATIENT)
Dept: FAMILY MEDICINE | Facility: CLINIC | Age: 54
End: 2024-08-26
Payer: COMMERCIAL

## 2024-08-26 VITALS
OXYGEN SATURATION: 98 % | DIASTOLIC BLOOD PRESSURE: 80 MMHG | HEIGHT: 67 IN | SYSTOLIC BLOOD PRESSURE: 104 MMHG | BODY MASS INDEX: 26.12 KG/M2 | WEIGHT: 166.44 LBS | HEART RATE: 74 BPM

## 2024-08-26 DIAGNOSIS — I69.314 FRONTAL LOBE AND EXECUTIVE FUNCTION DEFICIT FOLLOWING CEREBRAL INFARCTION: Primary | ICD-10-CM

## 2024-08-26 DIAGNOSIS — E78.5 DYSLIPIDEMIA: ICD-10-CM

## 2024-08-26 PROCEDURE — 99999 PR PBB SHADOW E&M-EST. PATIENT-LVL IV: CPT | Mod: PBBFAC,,, | Performed by: FAMILY MEDICINE

## 2024-08-26 PROCEDURE — 3044F HG A1C LEVEL LT 7.0%: CPT | Mod: CPTII,S$GLB,, | Performed by: FAMILY MEDICINE

## 2024-08-26 PROCEDURE — 3008F BODY MASS INDEX DOCD: CPT | Mod: CPTII,S$GLB,, | Performed by: FAMILY MEDICINE

## 2024-08-26 PROCEDURE — 99214 OFFICE O/P EST MOD 30 MIN: CPT | Mod: S$GLB,,, | Performed by: FAMILY MEDICINE

## 2024-08-26 PROCEDURE — 1159F MED LIST DOCD IN RCRD: CPT | Mod: CPTII,S$GLB,, | Performed by: FAMILY MEDICINE

## 2024-08-26 PROCEDURE — 3079F DIAST BP 80-89 MM HG: CPT | Mod: CPTII,S$GLB,, | Performed by: FAMILY MEDICINE

## 2024-08-26 PROCEDURE — 3074F SYST BP LT 130 MM HG: CPT | Mod: CPTII,S$GLB,, | Performed by: FAMILY MEDICINE

## 2024-08-26 PROCEDURE — 1111F DSCHRG MED/CURRENT MED MERGE: CPT | Mod: CPTII,S$GLB,, | Performed by: FAMILY MEDICINE

## 2024-08-26 PROCEDURE — 1160F RVW MEDS BY RX/DR IN RCRD: CPT | Mod: CPTII,S$GLB,, | Performed by: FAMILY MEDICINE

## 2024-08-26 RX ORDER — ATORVASTATIN CALCIUM 80 MG/1
80 TABLET, FILM COATED ORAL NIGHTLY
Qty: 90 TABLET | Refills: 3 | Status: SHIPPED | OUTPATIENT
Start: 2024-08-26

## 2024-08-26 RX ORDER — CLOPIDOGREL BISULFATE 75 MG/1
75 TABLET ORAL DAILY
Qty: 90 TABLET | Refills: 3 | Status: SHIPPED | OUTPATIENT
Start: 2024-08-26 | End: 2025-08-26

## 2024-08-26 NOTE — PROGRESS NOTES
Subjective     Patient ID: Monster Crespo is a 54 y.o. male.    Chief Complaint: Annual Exam    54 years old male came to the clinic after recent hospitalization secondary to stroke.  Patient is significantly better.  He is currently taking maximum dose statin, Plavix and a baby aspirin.  Blood pressure today was stable.  Patient with follow-up with the neurologist.      Review of Systems   Constitutional: Negative.    HENT: Negative.     Eyes: Negative.    Respiratory: Negative.     Cardiovascular: Negative.    Gastrointestinal: Negative.    Genitourinary: Negative.    Musculoskeletal: Negative.    Integumentary:  Negative.   Neurological: Negative.    Psychiatric/Behavioral: Negative.            Objective     Physical Exam  Vitals and nursing note reviewed.   Constitutional:       General: He is not in acute distress.     Appearance: He is well-developed. He is not diaphoretic.   HENT:      Head: Normocephalic and atraumatic.      Right Ear: External ear normal.      Left Ear: External ear normal.      Nose: Nose normal.      Mouth/Throat:      Pharynx: No oropharyngeal exudate.   Eyes:      General: No scleral icterus.        Right eye: No discharge.         Left eye: No discharge.      Conjunctiva/sclera: Conjunctivae normal.      Pupils: Pupils are equal, round, and reactive to light.   Neck:      Thyroid: No thyromegaly.      Vascular: No JVD.      Trachea: No tracheal deviation.   Cardiovascular:      Rate and Rhythm: Normal rate and regular rhythm.      Heart sounds: Normal heart sounds. No murmur heard.     No friction rub. No gallop.   Pulmonary:      Effort: Pulmonary effort is normal. No respiratory distress.      Breath sounds: Normal breath sounds. No stridor. No wheezing or rales.   Chest:      Chest wall: No tenderness.   Abdominal:      General: Bowel sounds are normal. There is no distension.      Palpations: Abdomen is soft. There is no mass.      Tenderness: There is no abdominal tenderness.  There is no guarding or rebound.   Musculoskeletal:         General: No tenderness. Normal range of motion.      Cervical back: Normal range of motion and neck supple.   Lymphadenopathy:      Cervical: No cervical adenopathy.   Skin:     General: Skin is warm and dry.      Coloration: Skin is not pale.      Findings: No erythema or rash.   Neurological:      Mental Status: He is alert and oriented to person, place, and time.      Cranial Nerves: No cranial nerve deficit.      Motor: No abnormal muscle tone.      Coordination: Coordination normal.      Deep Tendon Reflexes: Reflexes are normal and symmetric. Reflexes normal.   Psychiatric:         Behavior: Behavior normal.         Thought Content: Thought content normal.         Judgment: Judgment normal.            Assessment and Plan         2. Left Frontal Lobe and insula infarct  -     clopidogreL (PLAVIX) 75 mg tablet; Take 1 tablet (75 mg total) by mouth once daily.  Dispense: 90 tablet; Refill: 3  -     atorvastatin (LIPITOR) 80 MG tablet; Take 1 tablet (80 mg total) by mouth every evening.  Dispense: 90 tablet; Refill: 3  -     TSH; Future; Expected date: 08/26/2024  -     Comprehensive Metabolic Panel; Future; Expected date: 08/26/2024  -     Lipid Panel; Future; Expected date: 08/26/2024    3. Dyslipidemia  -     atorvastatin (LIPITOR) 80 MG tablet; Take 1 tablet (80 mg total) by mouth every evening.  Dispense: 90 tablet; Refill: 3  -     TSH; Future; Expected date: 08/26/2024  -     Comprehensive Metabolic Panel; Future; Expected date: 08/26/2024  -     Lipid Panel; Future; Expected date: 08/26/2024        I spent a total of 30 minutes on the day of the visit.This includes face to face time and non-face to face time preparing to see the patient (eg, review of tests), obtaining and/or reviewing separately obtained history, documenting clinical information in the electronic or other health record, independently interpreting results and communicating results  to the patient/family/caregiver, or care coordinator.          Follow up in about 4 months (around 12/26/2024).

## 2024-09-23 DIAGNOSIS — F32.A DEPRESSION, UNSPECIFIED DEPRESSION TYPE: ICD-10-CM

## 2024-09-23 DIAGNOSIS — G47.00 INSOMNIA, UNSPECIFIED TYPE: ICD-10-CM

## 2024-09-23 NOTE — TELEPHONE ENCOUNTER
No care due was identified.  [facility] Embedded Care Due Messages. Reference number: [id number].   9/23/2024 12:03:50 AM CDT

## 2024-09-24 RX ORDER — DOXEPIN HYDROCHLORIDE 100 MG/1
100 CAPSULE ORAL NIGHTLY
Qty: 90 CAPSULE | Refills: 3 | Status: SHIPPED | OUTPATIENT
Start: 2024-09-24

## 2024-09-24 NOTE — TELEPHONE ENCOUNTER
Refill Decision Note   Monster Crespo  is requesting a refill authorization.  Brief Assessment and Rationale for Refill:  Approve     Medication Therapy Plan:         Pharmacist review requested: Yes   Comments:     Note composed:9:00 AM 09/24/2024

## 2024-09-24 NOTE — TELEPHONE ENCOUNTER
Refill Routing Note   Medication(s) are not appropriate for processing by [facility] for the following reason(s):        Drug-disease interactiondoxepin and LUDY (obstructive sleep apnea)     [facility] action(s):  Defer             Pharmacist review requested: Yes     Appointments  past 12m or future 3m with PCP    Date Provider   Last Visit   8/26/2024 James Dawson MD   Next Visit   12/31/2024 James Dawson MD   ED visits in past 90 days: 0        Note composed:8:32 AM 09/24/2024

## 2024-09-24 NOTE — PROGRESS NOTES
Vascular Neurology Clinic  Initial Consult    Patient Name: Monster Crespo  MRN: [id number]    CC: hospital follow up     HPI: Monster Crespo is a 54 y.o.  male w/ PMH significant for HLD, LUDY , GERD, insomnia presenting as referral status-post hospital admission on 8/10/24 -8/12/24 [facility].   Patient was recently admitted to the Hospital medicine service after presenting with word finding difficulty  and NIHSS 4.   He was on the way to work and while talking to son, he could not use his words, could not speak - word finding could not speak at all. Could nod yes or no, could not understand what people were asking him. Tele stroke done and symptoms concerning for aphasia and TNK given. CTA no LVO or HG stenosis. MRI w moderate L frontal infarct. Next day NIHSS 1, naming still impaired. Started on DAPT and high intensity statin, which he was not taking prior to this. Discharged to Home w/ OP therapyw/ NIHSS of 1, mrS of 0. No toxicology screening done, patient denies any drug use.   He took a week off and went to SLP therapy.     Patient had never had a stroke, no family history of infarct. No smoking. He feels back to baseline.   He refers that he has insomnia and takes doxepin but has a diagnosis of LUDY  but does not have a CPAP machine.   He denies any palpitations or cardiac complaints.     Etiology: ESUS  Intervention: TNK    Work-up:    Brain Imaging  - NCCTH:  - MRI brain without contrast:   Moderate Acute infarction in the left frontal lobe and insula.     Vessel Imaging  - CTA head and neck:  . Unremarkable CTA head and neck. No large vessel occlusion or high-grade stenosis.     Cardiac Evaluation  - TTE:    Left Ventricle: The left ventricle is normal in size. There is concentric remodeling. There is normal systolic function. Biplane (2D) method of discs ejection fraction is 71%. There is normal diastolic function.    Right Ventricle: Normal right ventricular cavity size. Systolic function is normal. TAPSE  is 2.17 cm.    Pulmonary Artery: The estimated pulmonary artery systolic pressure is 6 mmHg.    IVC/SVC: Normal venous pressure at 3 mmHg.    . Study is negative for shunt    Study appears to be negative for shunt.    - Cardiac monitor:     Relevant Lab work   Recent Labs   Lab 08/10/24  1558   Hemoglobin A1C 5.6   LDL Cholesterol 133.0   HDL 33 L   Triglycerides 270 H   Cholesterol 220 H         NIH Stroke Scale:    Level of Consciousness: 0 - alert  LOC Questions: 0 - answers both correctly  LOC Commands: 0 - performs both correctly  Best Gaze: 0 - normal  Visual: 0 - no visual loss  Facial Palsy: 0 - normal  Motor Left Arm: 0 - no drift  Motor Right Arm: 0 - no drift  Motor Left Le - no drift  Motor Right Le - no drift  Limb Ataxia: 0 - absent  Sensory: 0 - normal  Best Language: 0 - no aphasia  Dysarthria: 0 - normal articulation  Extinction and Inattention: 0 - no neglect  NIH Stroke Scale Total: 0         Review of Systems:  General: No fevers, chills  Eyes: No changes in vision  ENT: No changes in hearing  Respiratory: No SOB  CV: No chest pain, palpitations  GI: No diarrhea, blood in stool  Urinary: No dysuria, hematuria  Skin: No rashes  Neurological: No weakness, confusion  Psychiatric: No auditory nor visual hallucinations      Past Medical History  Past Medical History:   Diagnosis Date    GERD (gastroesophageal reflux disease)     Insomnia        Medications    Current Outpatient Medications:     aspirin 81 MG Chew, Take 1 tablet (81 mg total) by mouth once daily., Disp: 90 tablet, Rfl: 3    atorvastatin (LIPITOR) 80 MG tablet, Take 1 tablet (80 mg total) by mouth every evening., Disp: 90 tablet, Rfl: 3    doxepin (SINEQUAN) 100 MG capsule, Take 1 capsule (100 mg total) by mouth every evening., Disp: 90 capsule, Rfl: 3    pantoprazole (PROTONIX) 40 MG tablet, TAKE 1 TABLET BY MOUTH EVERY DAY BEFORE BREAKFAST, Disp: 30 tablet, Rfl: 0    EScitalopram oxalate (LEXAPRO) 10 MG tablet, TAKE 1 TABLET  "BY MOUTH EVERY DAY (Patient not taking: Reported on 9/25/2024), Disp: 90 tablet, Rfl: 3  Any other notable medications as documented in HPI    Allergies  Review of patient's allergies indicates:  No Known Allergies    Social History  Social History     Socioeconomic History    Marital status: Single   Tobacco Use    Smoking status: Never    Smokeless tobacco: Never   Substance and Sexual Activity    Alcohol use: Yes     Comment: 1 a month     Social Determinants of Health     Financial Resource Strain: Low Risk  (8/12/2024)    Overall Financial Resource Strain (CARDIA)     Difficulty of Paying Living Expenses: Not hard at all   Food Insecurity: No Food Insecurity (8/12/2024)    Hunger Vital Sign     Worried About Running Out of Food in the Last Year: Never true     Ran Out of Food in the Last Year: Never true   Transportation Needs: No Transportation Needs (8/12/2024)    TRANSPORTATION NEEDS     Transportation : No   Physical Activity: Inactive (8/12/2024)    Exercise Vital Sign     Days of Exercise per Week: 0 days     Minutes of Exercise per Session: 0 min   Stress: No Stress Concern Present (8/12/2024)    [employer] - Occupational Stress Questionnaire     Feeling of Stress : Only a little   Housing Stability: Low Risk  (8/12/2024)    Housing Stability Vital Sign     Unable to Pay for Housing in the Last Year: No     Homeless in the Last Year: No     Any other notable Social History as documented in HPI.    Family History  Family History   Problem Relation Name Age of Onset    Hypertension Mother      Amblyopia Neg Hx      Blindness Neg Hx      Cataracts Neg Hx      Glaucoma Neg Hx      Macular degeneration Neg Hx      Retinal detachment Neg Hx      Strabismus Neg Hx       Any other notable FMH as documented in HPI.    Physical Exam  /81 (BP Location: Right arm, Patient Position: Sitting, BP Method: Large (Automatic))   Pulse 81   Ht 5' 7" (1.702 m)   Wt 75.5 kg (166 lb 7.2 " oz)   BMI 26.07 kg/m²     General: Well-developed, well-groomed. No apparent distress  HENT: Normocephalic, atraumatic.    Cardiovascular: Regular rate and rhythm  Chest: No audible wheezes, stridor, ronchi appreciated.  Musculoskeletal: No peripheral edema    Neurologic Exam: The patient is awake, alert and oriented to person, place, time and situation. Attentive, vigilant during exam. Language is fluent. Naming & repetition intact, +2-step commands.  Fund of knowledge is appropriate. Well organized thoughts.      Cranial nerves:   CN II: Visual fields are full to confrontation. Pupils are 4 mm and briskly reactive to light.  CN III, IV, VI: EOMI, no nystagmus, no ptosis  CN V: Facial sensation is intact in all 3 divisions bilaterally.  CN VII: Face is symmetric with normal eye closure and smile.  CN VIII: Hearing is normal bilaterally  CN IX, X: Palate elevates symmetrically. Phonation is normal.  CN XI: Head turning and shoulder shrug are intact  CN XII: Tongue is midline with normal movements and no atrophy.    Motor examination of all extremities demonstrates normal bulk and tone in all four limbs. There are no atrophy or fasciculations.      Left Right   Left Right   Deltoid 5/5 5/5  Hip Flexion 5/5 5/5   Biceps 5/5 5/5  Hip Extension 5/5 5/5   Triceps 5/5 5/5  Knee Flexion 5/5 5/5   Wrist Ext 5/5 5/5  Knee Extension 5/5 5/5   Finger Abd 5/5 5/5  Ankle dorsiflex 5/5 5/5       Ankle plantar flex 5/5 5/5       Sensory examination is normal light touch in BUE and BLE.    Sensation to light touch: intact in BUE and BLE    Deep tendon reflexes are 2+ and symmetric in the upper and lower extremities bilaterally.    Gait: Normal tandem, and casual gait.     Coordination: No dysmetria with finger-to-nose. Rapid alternating movements and fine finger movements are intact.         Assessment and Plan  Monster Crespo is a 54 y.o.  male w/ PMH significant for HLD, LUDY , GERD, insomnia presenting as referral status-post  hospital admission on 8/10/24 -8/12/24 [facility]. He presented with aphasia, NIHSS 4 and received TNK. Post TNK NIHSS 1 and now back to baseline. MRI showed moderate size L MCA embolic infarct and no significant atherosclerotic changes on CTA. He had ECHO w bubble and negative PFO. Stroke RF is HLD and LUDY, currently on atorvastatin 80mg. No previous stroke or family history of stroke. Given his age and low stroke RF will proceed with cardiac monitor for arrhythmia workup as well as TCD w bubble as more specific for PFO monitoring.   We discussed importance of reducing stroke risks, including LUDY treatment.       Diagnosis/Etiology: embolic L MCA infarct/ ESUS   Stroke Risk Factors: LUDY, HLD       Recommendations:   Antiplatelet/Anticoagulation: continue ASA 81mg QD, no need for plavix   Lipid Management: Atorvastatin 80 mg QHS (long-term goal LDL < 70).   - Monitoring for liver dysfunction and myopathy is suggested for statins. To be addressed by PCP  Diabetes: Target hemoglobin A1c <7%, measured 2X/year or quarterly if not meeting goals  Hypertension: Long term goal is normotension w/ target BP of less than 130/80 mmHg  Sleep: return to sleep clinic for CPAP use.   Smoking: Goal is smoking cessation and encouragement not to start smoking in the future.   Diet: Discussed Mediterranean Diet recommendations (Adopted from Javi et al, [facility], 2018.)  - Eat primarily plant-based foods, such as fruits and vegetables, whole grains, legumes (beans) and nuts  - Limit refined carbohydrates (white pasta, bread, rice).  - Replace butter with healthy fats such as olive oil.  - Use herbs and spices instead of salt to flavor foods.  - Limit red meat and processed meats to no more than a few times a month.  - Avoid sugary sodas, bakery goods, and sweets.  - Eat fish and poultry at least twice a week.  - Get plenty of exercise (150 minutes per week).  Cardiac event monitor ordered   TCD w bubble   - if the above unremarkable then  will consider hypercoagulable workup vs TATUM.       RTC in 3 months     Problem List Items Addressed This Visit          Neuro    Embolic stroke involving left middle cerebral artery - Primary    Relevant Orders    Cardiac event monitor    TCD Emboli Detection w/ Intravenous    Cryptogenic stroke    Relevant Orders    Cardiac event monitor    TCD Emboli Detection w/ Intravenous       Cardiac/Vascular    Dyslipidemia       Other    LUDY (obstructive sleep apnea)       Lani Frank MD   Vascular Neurology Fellow- PGY5  [facility]

## 2024-09-25 ENCOUNTER — OFFICE VISIT (OUTPATIENT)
Dept: NEUROLOGY | Facility: CLINIC | Age: 54
End: 2024-09-25
Payer: COMMERCIAL

## 2024-09-25 VITALS
BODY MASS INDEX: 26.12 KG/M2 | DIASTOLIC BLOOD PRESSURE: 81 MMHG | HEART RATE: 81 BPM | SYSTOLIC BLOOD PRESSURE: 129 MMHG | HEIGHT: 67 IN | WEIGHT: 166.44 LBS

## 2024-09-25 DIAGNOSIS — I63.412 EMBOLIC STROKE INVOLVING LEFT MIDDLE CEREBRAL ARTERY: Primary | ICD-10-CM

## 2024-09-25 DIAGNOSIS — I63.9 CRYPTOGENIC STROKE: ICD-10-CM

## 2024-09-25 DIAGNOSIS — G47.33 OSA (OBSTRUCTIVE SLEEP APNEA): ICD-10-CM

## 2024-09-25 DIAGNOSIS — E78.5 DYSLIPIDEMIA: ICD-10-CM

## 2024-09-25 PROCEDURE — 99999 PR PBB SHADOW E&M-EST. PATIENT-LVL III: CPT | Mod: PBBFAC,,, | Performed by: STUDENT IN AN ORGANIZED HEALTH CARE EDUCATION/TRAINING PROGRAM

## 2024-09-25 NOTE — PATIENT INSTRUCTIONS
Pare de liam plavix (clopidrogel) 75mg.     Continue aspirina 81mg y atorvastatina (lipitor) 80mg diario.

## 2024-09-30 ENCOUNTER — CLINICAL SUPPORT (OUTPATIENT)
Dept: CARDIOLOGY | Facility: HOSPITAL | Age: 54
End: 2024-09-30
Attending: STUDENT IN AN ORGANIZED HEALTH CARE EDUCATION/TRAINING PROGRAM
Payer: COMMERCIAL

## 2024-09-30 DIAGNOSIS — I63.9 CRYPTOGENIC STROKE: ICD-10-CM

## 2024-09-30 DIAGNOSIS — I63.412 EMBOLIC STROKE INVOLVING LEFT MIDDLE CEREBRAL ARTERY: ICD-10-CM

## 2024-10-18 ENCOUNTER — TELEPHONE (OUTPATIENT)
Dept: SLEEP MEDICINE | Facility: CLINIC | Age: 54
End: 2024-10-18
Payer: COMMERCIAL

## 2024-10-18 NOTE — TELEPHONE ENCOUNTER
Patient was scheduled for 01/13/2025, patient has been moved   to 03/18/2025 at 9:00 due to the provider being out/lvm letter

## 2024-12-31 ENCOUNTER — OFFICE VISIT (OUTPATIENT)
Dept: FAMILY MEDICINE | Facility: CLINIC | Age: 54
End: 2024-12-31
Payer: COMMERCIAL

## 2024-12-31 VITALS
WEIGHT: 164 LBS | HEIGHT: 67 IN | SYSTOLIC BLOOD PRESSURE: 116 MMHG | DIASTOLIC BLOOD PRESSURE: 74 MMHG | OXYGEN SATURATION: 98 % | HEART RATE: 74 BPM | BODY MASS INDEX: 25.74 KG/M2

## 2024-12-31 DIAGNOSIS — Z23 NEED FOR VACCINATION AGAINST STREPTOCOCCUS PNEUMONIAE: ICD-10-CM

## 2024-12-31 DIAGNOSIS — Z23 NEEDS FLU SHOT: ICD-10-CM

## 2024-12-31 DIAGNOSIS — Z12.11 SCREEN FOR COLON CANCER: ICD-10-CM

## 2024-12-31 DIAGNOSIS — F33.1 MODERATE EPISODE OF RECURRENT MAJOR DEPRESSIVE DISORDER: ICD-10-CM

## 2024-12-31 DIAGNOSIS — E78.5 DYSLIPIDEMIA: Primary | ICD-10-CM

## 2024-12-31 PROCEDURE — 99999 PR PBB SHADOW E&M-EST. PATIENT-LVL IV: CPT | Mod: PBBFAC,,, | Performed by: FAMILY MEDICINE

## 2024-12-31 PROCEDURE — 3078F DIAST BP <80 MM HG: CPT | Mod: CPTII,S$GLB,, | Performed by: FAMILY MEDICINE

## 2024-12-31 PROCEDURE — 1159F MED LIST DOCD IN RCRD: CPT | Mod: CPTII,S$GLB,, | Performed by: FAMILY MEDICINE

## 2024-12-31 PROCEDURE — 3044F HG A1C LEVEL LT 7.0%: CPT | Mod: CPTII,S$GLB,, | Performed by: FAMILY MEDICINE

## 2024-12-31 PROCEDURE — 90472 IMMUNIZATION ADMIN EACH ADD: CPT | Mod: S$GLB,,, | Performed by: FAMILY MEDICINE

## 2024-12-31 PROCEDURE — 99215 OFFICE O/P EST HI 40 MIN: CPT | Mod: 25,S$GLB,, | Performed by: FAMILY MEDICINE

## 2024-12-31 PROCEDURE — 90656 IIV3 VACC NO PRSV 0.5 ML IM: CPT | Mod: S$GLB,,, | Performed by: FAMILY MEDICINE

## 2024-12-31 PROCEDURE — 90677 PCV20 VACCINE IM: CPT | Mod: S$GLB,,, | Performed by: FAMILY MEDICINE

## 2024-12-31 PROCEDURE — 90471 IMMUNIZATION ADMIN: CPT | Mod: S$GLB,,, | Performed by: FAMILY MEDICINE

## 2024-12-31 PROCEDURE — 3008F BODY MASS INDEX DOCD: CPT | Mod: CPTII,S$GLB,, | Performed by: FAMILY MEDICINE

## 2024-12-31 PROCEDURE — 3074F SYST BP LT 130 MM HG: CPT | Mod: CPTII,S$GLB,, | Performed by: FAMILY MEDICINE

## 2024-12-31 PROCEDURE — 1160F RVW MEDS BY RX/DR IN RCRD: CPT | Mod: CPTII,S$GLB,, | Performed by: FAMILY MEDICINE

## 2024-12-31 RX ORDER — ESCITALOPRAM OXALATE 20 MG/1
20 TABLET ORAL DAILY
Qty: 90 TABLET | Refills: 3 | Status: SHIPPED | OUTPATIENT
Start: 2024-12-31 | End: 2025-12-31

## 2024-12-31 NOTE — PROGRESS NOTES
Subjective     Patient ID: Monster Crespo is a 54 y.o. male.    Chief Complaint: Hyperlipidemia and Depression    54 years old male came to the clinic for cholesterol check.  Patient is not taking the cholesterol medicine consistently.  He reports that using higher dose of Lexapro is improving his symptoms.  No suicidal or homicidal ideations.    Hyperlipidemia  This is a chronic problem. Recent lipid tests were reviewed and are high. He has no history of obesity. There are no known factors aggravating his hyperlipidemia. Pertinent negatives include no chest pain. He is currently on no antihyperlipidemic treatment. The current treatment provides no improvement of lipids. Compliance problems include adherence to exercise.  Risk factors for coronary artery disease include a sedentary lifestyle and male sex.   Depression  Visit Type: follow-up  Patient presents with the following symptoms: decreased concentration and depressed mood.  Patient is not experiencing: suicidal ideas and suicidal planning.  Frequency of symptoms: most days   Severity: moderate   Sleep quality: fair      Review of Systems   Constitutional: Negative.    HENT: Negative.     Eyes: Negative.    Respiratory: Negative.     Cardiovascular: Negative.  Negative for chest pain.   Gastrointestinal: Negative.    Genitourinary: Negative.    Musculoskeletal: Negative.    Integumentary:  Negative.   Neurological: Negative.    Psychiatric/Behavioral:  Positive for decreased concentration, depression and depressed mood. Negative for suicidal ideas.           Objective     Physical Exam  Vitals and nursing note reviewed.   Constitutional:       General: He is not in acute distress.     Appearance: He is well-developed. He is not diaphoretic.   HENT:      Head: Normocephalic and atraumatic.      Right Ear: External ear normal.      Left Ear: External ear normal.      Nose: Nose normal.      Mouth/Throat:      Pharynx: No oropharyngeal exudate.   Eyes:       General: No scleral icterus.        Right eye: No discharge.         Left eye: No discharge.      Conjunctiva/sclera: Conjunctivae normal.      Pupils: Pupils are equal, round, and reactive to light.   Neck:      Thyroid: No thyromegaly.      Vascular: No JVD.      Trachea: No tracheal deviation.   Cardiovascular:      Rate and Rhythm: Normal rate and regular rhythm.      Heart sounds: Normal heart sounds. No murmur heard.     No friction rub. No gallop.   Pulmonary:      Effort: Pulmonary effort is normal. No respiratory distress.      Breath sounds: Normal breath sounds. No stridor. No wheezing or rales.   Chest:      Chest wall: No tenderness.   Abdominal:      General: Bowel sounds are normal. There is no distension.      Palpations: Abdomen is soft. There is no mass.      Tenderness: There is no abdominal tenderness. There is no guarding or rebound.   Musculoskeletal:         General: No tenderness. Normal range of motion.      Cervical back: Normal range of motion and neck supple.   Lymphadenopathy:      Cervical: No cervical adenopathy.   Skin:     General: Skin is warm and dry.      Coloration: Skin is not pale.      Findings: No erythema or rash.   Neurological:      Mental Status: He is alert and oriented to person, place, and time.      Cranial Nerves: No cranial nerve deficit.      Motor: No abnormal muscle tone.      Coordination: Coordination normal.      Deep Tendon Reflexes: Reflexes are normal and symmetric. Reflexes normal.   Psychiatric:         Behavior: Behavior normal.         Thought Content: Thought content normal.         Judgment: Judgment normal.            Assessment and Plan     1. Dyslipidemia  -     Comprehensive Metabolic Panel; Future; Expected date: 12/31/2024  -     CBC Auto Differential; Future; Expected date: 12/31/2024  -     Lipid Panel; Future; Expected date: 12/31/2024  -     Hemoglobin A1C; Future; Expected date: 12/31/2024    2. Needs flu shot  -     influenza (Flulaval,  Fluzone, Fluarix) 45 mcg/0.5 mL IM vaccine (> or = 6 mo) 0.5 mL    3. Need for vaccination against Streptococcus pneumoniae  -     (VFC) PCV20 (Prevnar 20) IM vaccine (>/= 6 wks)    4. Screen for colon cancer  -     Ambulatory referral/consult to Endo Procedure ; Future; Expected date: 01/01/2025    5. Moderate episode of recurrent major depressive disorder  -     EScitalopram oxalate (LEXAPRO) 20 MG tablet; Take 1 tablet (20 mg total) by mouth once daily.  Dispense: 90 tablet; Refill: 3        I spent a total of 41 minutes on the day of the visit.This includes face to face time and non-face to face time preparing to see the patient (eg, review of tests), obtaining and/or reviewing separately obtained history, documenting clinical information in the electronic or other health record, independently interpreting results and communicating results to the patient/family/caregiver, or care coordinator.          Follow up in about 6 months (around 6/30/2025), or if symptoms worsen or fail to improve.

## 2025-01-02 ENCOUNTER — LAB VISIT (OUTPATIENT)
Dept: LAB | Facility: HOSPITAL | Age: 55
End: 2025-01-02
Attending: FAMILY MEDICINE
Payer: COMMERCIAL

## 2025-01-02 DIAGNOSIS — E78.5 DYSLIPIDEMIA: ICD-10-CM

## 2025-01-02 LAB
ALBUMIN SERPL BCP-MCNC: 3.7 G/DL (ref 3.5–5.2)
ALP SERPL-CCNC: 78 U/L (ref 40–150)
ALT SERPL W/O P-5'-P-CCNC: 32 U/L (ref 10–44)
ANION GAP SERPL CALC-SCNC: 8 MMOL/L (ref 8–16)
AST SERPL-CCNC: 20 U/L (ref 10–40)
BASOPHILS # BLD AUTO: 0.04 K/UL (ref 0–0.2)
BASOPHILS NFR BLD: 0.6 % (ref 0–1.9)
BILIRUB SERPL-MCNC: 0.6 MG/DL (ref 0.1–1)
BUN SERPL-MCNC: 19 MG/DL (ref 6–20)
CALCIUM SERPL-MCNC: 9.1 MG/DL (ref 8.7–10.5)
CHLORIDE SERPL-SCNC: 106 MMOL/L (ref 95–110)
CHOLEST SERPL-MCNC: 156 MG/DL (ref 120–199)
CHOLEST/HDLC SERPL: 3.8 {RATIO} (ref 2–5)
CO2 SERPL-SCNC: 27 MMOL/L (ref 23–29)
CREAT SERPL-MCNC: 0.9 MG/DL (ref 0.5–1.4)
DIFFERENTIAL METHOD BLD: ABNORMAL
EOSINOPHIL # BLD AUTO: 0.3 K/UL (ref 0–0.5)
EOSINOPHIL NFR BLD: 3.9 % (ref 0–8)
ERYTHROCYTE [DISTWIDTH] IN BLOOD BY AUTOMATED COUNT: 14.5 % (ref 11.5–14.5)
EST. GFR  (NO RACE VARIABLE): >60 ML/MIN/1.73 M^2
ESTIMATED AVG GLUCOSE: 117 MG/DL (ref 68–131)
GLUCOSE SERPL-MCNC: 96 MG/DL (ref 70–110)
HBA1C MFR BLD: 5.7 % (ref 4–5.6)
HCT VFR BLD AUTO: 46.6 % (ref 40–54)
HDLC SERPL-MCNC: 41 MG/DL (ref 40–75)
HDLC SERPL: 26.3 % (ref 20–50)
HGB BLD-MCNC: 14.4 G/DL (ref 14–18)
IMM GRANULOCYTES # BLD AUTO: 0.03 K/UL (ref 0–0.04)
IMM GRANULOCYTES NFR BLD AUTO: 0.4 % (ref 0–0.5)
LDLC SERPL CALC-MCNC: 92 MG/DL (ref 63–159)
LYMPHOCYTES # BLD AUTO: 1.9 K/UL (ref 1–4.8)
LYMPHOCYTES NFR BLD: 27 % (ref 18–48)
MCH RBC QN AUTO: 27.9 PG (ref 27–31)
MCHC RBC AUTO-ENTMCNC: 30.9 G/DL (ref 32–36)
MCV RBC AUTO: 90 FL (ref 82–98)
MONOCYTES # BLD AUTO: 0.6 K/UL (ref 0.3–1)
MONOCYTES NFR BLD: 8.4 % (ref 4–15)
NEUTROPHILS # BLD AUTO: 4.3 K/UL (ref 1.8–7.7)
NEUTROPHILS NFR BLD: 59.7 % (ref 38–73)
NONHDLC SERPL-MCNC: 115 MG/DL
NRBC BLD-RTO: 0 /100 WBC
PLATELET # BLD AUTO: 303 K/UL (ref 150–450)
PMV BLD AUTO: 9.9 FL (ref 9.2–12.9)
POTASSIUM SERPL-SCNC: 4 MMOL/L (ref 3.5–5.1)
PROT SERPL-MCNC: 7.2 G/DL (ref 6–8.4)
RBC # BLD AUTO: 5.16 M/UL (ref 4.6–6.2)
SODIUM SERPL-SCNC: 141 MMOL/L (ref 136–145)
TRIGL SERPL-MCNC: 115 MG/DL (ref 30–150)
WBC # BLD AUTO: 7.14 K/UL (ref 3.9–12.7)

## 2025-01-02 PROCEDURE — 85025 COMPLETE CBC W/AUTO DIFF WBC: CPT | Performed by: FAMILY MEDICINE

## 2025-01-02 PROCEDURE — 36415 COLL VENOUS BLD VENIPUNCTURE: CPT | Mod: PO | Performed by: FAMILY MEDICINE

## 2025-01-02 PROCEDURE — 80053 COMPREHEN METABOLIC PANEL: CPT | Performed by: FAMILY MEDICINE

## 2025-01-02 PROCEDURE — 83036 HEMOGLOBIN GLYCOSYLATED A1C: CPT | Performed by: FAMILY MEDICINE

## 2025-01-02 PROCEDURE — 80061 LIPID PANEL: CPT | Performed by: FAMILY MEDICINE

## 2025-02-27 ENCOUNTER — OFFICE VISIT (OUTPATIENT)
Dept: URGENT CARE | Facility: CLINIC | Age: 55
End: 2025-02-27
Payer: COMMERCIAL

## 2025-02-27 VITALS
SYSTOLIC BLOOD PRESSURE: 113 MMHG | OXYGEN SATURATION: 95 % | BODY MASS INDEX: 25.74 KG/M2 | HEIGHT: 67 IN | WEIGHT: 164 LBS | HEART RATE: 74 BPM | DIASTOLIC BLOOD PRESSURE: 69 MMHG | TEMPERATURE: 98 F | RESPIRATION RATE: 20 BRPM

## 2025-02-27 DIAGNOSIS — R09.82 PND (POST-NASAL DRIP): ICD-10-CM

## 2025-02-27 DIAGNOSIS — J06.9 VIRAL URI WITH COUGH: Primary | ICD-10-CM

## 2025-02-27 DIAGNOSIS — R05.9 COUGH, UNSPECIFIED TYPE: ICD-10-CM

## 2025-02-27 LAB
CTP QC/QA: YES
CTP QC/QA: YES
POC MOLECULAR INFLUENZA A AGN: NEGATIVE
POC MOLECULAR INFLUENZA B AGN: NEGATIVE
SARS CORONAVIRUS 2 ANTIGEN: NEGATIVE

## 2025-02-27 RX ORDER — FLUTICASONE PROPIONATE 50 MCG
1 SPRAY, SUSPENSION (ML) NASAL 2 TIMES DAILY
Qty: 15.8 ML | Refills: 0 | Status: SHIPPED | OUTPATIENT
Start: 2025-02-27 | End: 2025-03-29

## 2025-02-27 RX ORDER — PROMETHAZINE HYDROCHLORIDE AND DEXTROMETHORPHAN HYDROBROMIDE 6.25; 15 MG/5ML; MG/5ML
5 SYRUP ORAL NIGHTLY PRN
Qty: 180 ML | Refills: 0 | Status: SHIPPED | OUTPATIENT
Start: 2025-02-27 | End: 2025-04-04

## 2025-02-27 NOTE — PATIENT INSTRUCTIONS
Fever/Pain: Alternate Tylenol and Ibuprofen as needed every 4-6 hours  Cough: Cough syrup nightly as needed  Nasal congestion/Runny nose: Nasal spray twice daily as needed  Monitor for chest pain, shortness of breath, worsening of symptoms, or fever unresponsive to medication.   Please drink plenty of fluids.  Please get plenty of rest.  Please return here or go to the Emergency Department for any concerns or worsening of condition.  If you were prescribed antibiotics, please take them to completion.  If you were given wait & see antibiotics, please wait 5-7 days before taking them, and only take them if your symptoms have worsened or not improved.  If you do begin taking the antibiotics, please take them to completion.  If you were prescribed a narcotic medication, do not drive or operate heavy equipment or machinery while taking these medications.  If you were given a steroid shot in the clinic and have also been given a prescription for a steroid such as Prednisone or a Medrol Dose Pack, please begin taking them tomorrow.  If you do not have Hypertension or any history of palpitations, it is ok to take over the counter Sudafed or Mucinex D or Allegra-D or Claritin-D or Zyrtec-D.  If you do take one of the above, it is ok to combine that with plain over the counter Mucinex or Allegra or Claritin or Zyrtec.  If for example you are taking Zyrtec -D, you can combine that with Mucinex, but not Mucinex-D.  If you are taking Mucinex-D, you can combine that with plain Allegra or Claritin or Zyrtec.   If you do have Hypertension or palpitations, it is safe to take Coricidin HBP for relief of sinus symptoms.  If not allergic, please take over the counter Tylenol (Acetaminophen) and/or Motrin (Ibuprofen) as directed for control of pain and/or fever.  Please follow up with your primary care doctor or specialist as needed.    If you  smoke, please stop smoking.

## 2025-02-27 NOTE — PROGRESS NOTES
"Subjective:      Patient ID: Monster Crespo is a 55 y.o. male.    Vitals:  height is 5' 7" (1.702 m) and weight is 74.4 kg (164 lb 0.4 oz). His oral temperature is 98.3 °F (36.8 °C). His blood pressure is 113/69 and his pulse is 74. His respiration is 20 and oxygen saturation is 95%.     Chief Complaint: Cough    Pt is a 54 yo M with PMHx of HLD. He is presenting with cough, chills, PND, congestion  Onset of symptoms was 4 days ago. Denies any CP, SOB, ABD pain, N/V/D. States that he had URI symptoms upon onset, but only coughing now.  Pt reports using OTC Theraflu without relief.    MA note:   Pt presents today with a dry cough, sx started Sunday, it was worse in the beginning but now he is left with a dry cough, tx: otc     Cough  This is a new problem. The current episode started in the past 7 days. The problem has been gradually worsening. The problem occurs constantly. The cough is Non-productive. Associated symptoms include nasal congestion and postnasal drip. Pertinent negatives include no chest pain, chills, ear congestion, ear pain, eye redness, fever, headaches, heartburn, hemoptysis, myalgias, rash, rhinorrhea, sore throat, shortness of breath, sweats, weight loss or wheezing. Nothing aggravates the symptoms. He has tried OTC cough suppressant for the symptoms. The treatment provided no relief. There is no history of asthma, bronchiectasis, bronchitis, COPD, environmental allergies or pneumonia.       Constitution: Negative for activity change, appetite change, chills, fatigue and fever.   HENT:  Positive for postnasal drip. Negative for ear pain, congestion, sinus pain, sinus pressure and sore throat.    Neck: Negative for neck pain and neck swelling.   Cardiovascular:  Negative for chest pain and sob on exertion.   Eyes:  Negative for eye trauma, eye discharge and eye redness.   Respiratory:  Positive for cough. Negative for bloody sputum, shortness of breath and wheezing.    Gastrointestinal:  " Negative for abdominal pain, nausea, vomiting, constipation, diarrhea and heartburn.   Genitourinary:  Negative for dysuria, frequency, urgency and urine decreased.   Musculoskeletal:  Negative for pain, joint pain, joint swelling, abnormal ROM of joint and muscle ache.   Skin:  Negative for color change, rash, laceration and erythema.   Allergic/Immunologic: Negative for environmental allergies.   Neurological:  Negative for dizziness, light-headedness, headaches, altered mental status and numbness.   Psychiatric/Behavioral:  Negative for altered mental status and confusion.       Objective:     Physical Exam   Constitutional: He is oriented to person, place, and time. He appears well-developed. He is cooperative.  Non-toxic appearance. He does not appear ill. No distress.      Comments:Pt sitting erect on examination table. No acute respiratory distress, no use of accessory muscles, no notice of nasal flaring.        HENT:   Head: Normocephalic and atraumatic.   Ears:   Right Ear: Hearing and external ear normal.   Left Ear: Hearing and external ear normal.   Nose: Nose normal. No mucosal edema, rhinorrhea, nasal deformity or congestion. No epistaxis. Right sinus exhibits no maxillary sinus tenderness and no frontal sinus tenderness. Left sinus exhibits no maxillary sinus tenderness and no frontal sinus tenderness.   Mouth/Throat: Uvula is midline, oropharynx is clear and moist and mucous membranes are normal. No trismus in the jaw. Normal dentition. No uvula swelling. No oropharyngeal exudate, posterior oropharyngeal edema or posterior oropharyngeal erythema.   Eyes: Conjunctivae and lids are normal. No scleral icterus.   Neck: Trachea normal and phonation normal. Neck supple. No edema present. No erythema present. No neck rigidity present.   Cardiovascular: Normal rate, regular rhythm, normal heart sounds and normal pulses.   Pulmonary/Chest: Effort normal and breath sounds normal. No accessory muscle usage. No  apnea, no tachypnea and no bradypnea. No respiratory distress. He has no decreased breath sounds. He has no wheezes. He has no rhonchi.   Lungs clear to auscultation B/L           Comments: Lungs clear to auscultation B/L      Abdominal: Normal appearance.   Musculoskeletal: Normal range of motion.         General: No deformity. Normal range of motion.   Neurological: He is alert and oriented to person, place, and time. He exhibits normal muscle tone. Coordination normal.   Skin: Skin is warm, dry, intact, not diaphoretic and not pale. No erythema   Psychiatric: His speech is normal and behavior is normal. Judgment and thought content normal.   Nursing note and vitals reviewed.    Results for orders placed or performed in visit on 02/27/25   SARS Coronavirus 2 Antigen, POCT Manual Read    Collection Time: 02/27/25  2:44 PM   Result Value Ref Range    SARS Coronavirus 2 Antigen Negative Negative, Presumptive Negative     Acceptable Yes    POCT Influenza A/B MOLECULAR    Collection Time: 02/27/25  2:45 PM   Result Value Ref Range    POC Molecular Influenza A Ag Negative Negative    POC Molecular Influenza B Ag Negative Negative     Acceptable Yes        Assessment:     1. Viral URI with cough    2. Cough, unspecified type    3. PND (post-nasal drip)        Plan:   I have reviewed the patient chart and pertinent past imaging/labs.      Viral URI with cough    Cough, unspecified type  -     SARS Coronavirus 2 Antigen, POCT Manual Read  -     POCT Influenza A/B MOLECULAR  -     promethazine-dextromethorphan (PROMETHAZINE-DM) 6.25-15 mg/5 mL Syrp; Take 5 mLs by mouth nightly as needed.  Dispense: 180 mL; Refill: 0    PND (post-nasal drip)  -     fluticasone propionate (FLONASE) 50 mcg/actuation nasal spray; 1 spray (50 mcg total) by Each Nostril route 2 (two) times a day.  Dispense: 15.8 mL; Refill: 0

## 2025-03-03 ENCOUNTER — HOSPITAL ENCOUNTER (EMERGENCY)
Facility: HOSPITAL | Age: 55
Discharge: HOME | End: 2025-03-03
Attending: EMERGENCY MEDICINE
Payer: COMMERCIAL

## 2025-03-03 VITALS
TEMPERATURE: 98 F | RESPIRATION RATE: 18 BRPM | BODY MASS INDEX: 25.11 KG/M2 | HEART RATE: 82 BPM | SYSTOLIC BLOOD PRESSURE: 132 MMHG | HEIGHT: 67 IN | OXYGEN SATURATION: 99 % | DIASTOLIC BLOOD PRESSURE: 75 MMHG | WEIGHT: 160 LBS

## 2025-03-03 DIAGNOSIS — J06.9 VIRAL URI WITH COUGH: Primary | ICD-10-CM

## 2025-03-03 DIAGNOSIS — R05.9 COUGH: ICD-10-CM

## 2025-03-03 PROCEDURE — 99283 EMERGENCY DEPT VISIT LOW MDM: CPT | Mod: 25

## 2025-03-03 RX ORDER — PROMETHAZINE HYDROCHLORIDE AND CODEINE PHOSPHATE 6.25; 1 MG/5ML; MG/5ML
5 SOLUTION ORAL EVERY 4 HOURS PRN
Qty: 200 ML | Refills: 0 | Status: SHIPPED | OUTPATIENT
Start: 2025-03-03 | End: 2025-03-13

## 2025-03-03 NOTE — ED PROVIDER NOTES
Encounter Date: 3/3/2025       History     Chief Complaint   Patient presents with    Cough     C/o productive mucus cough x5 days. Pt was seen at urgent care Friday w/o sx relief. Taking OTC cold/flu and Theraflu w/o relief. Denies CP, SOB, or other complaints.      Patient is a 55-year-old male who complains of congestion and a cough productive for clear sputum.  He is also experiencing body aches.  These symptoms began a few days ago.  He says he is unable to sleep at night due to the cough.  No vomiting or diarrhea.  He was seen at urgent care 3 days ago where he had negative COVID-19 and influenza swabs.      Review of patient's allergies indicates:  No Known Allergies  Past Medical History:   Diagnosis Date    GERD (gastroesophageal reflux disease)     Insomnia      History reviewed. No pertinent surgical history.  Family History   Problem Relation Name Age of Onset    Hypertension Mother      Amblyopia Neg Hx      Blindness Neg Hx      Cataracts Neg Hx      Glaucoma Neg Hx      Macular degeneration Neg Hx      Retinal detachment Neg Hx      Strabismus Neg Hx       Social History[1]  Review of Systems   Constitutional:  Negative for chills and fever.   HENT:  Positive for congestion. Negative for sore throat.    Respiratory:  Positive for cough. Negative for shortness of breath.    Musculoskeletal:  Positive for myalgias.   All other systems reviewed and are negative.      Physical Exam     Initial Vitals [03/03/25 0730]   BP Pulse Resp Temp SpO2   139/77 79 20 97.8 °F (36.6 °C) 99 %      MAP       --         Physical Exam    Nursing note and vitals reviewed.  Constitutional: No distress.   HENT: Mouth/Throat: Oropharynx is clear and moist.   Neck: Neck supple.   Cardiovascular:  Normal rate, regular rhythm and normal heart sounds.           Pulmonary/Chest: Breath sounds normal. He has no wheezes. He has no rhonchi.   Musculoskeletal:         General: Normal range of motion.      Cervical back: Neck supple.      Neurological: He is alert and oriented to person, place, and time.   Skin: Skin is warm and dry.         ED Course   Procedures  Labs Reviewed - No data to display       Imaging Results              X-Ray Chest PA And Lateral (Final result)  Result time 03/03/25 10:48:10      Final result by Smith Rg MD (03/03/25 10:48:10)                   Impression:      No acute cardiopulmonary abnormality.      Electronically signed by: Smith Rg  Date:    03/03/2025  Time:    10:48               Narrative:    EXAMINATION:  XR CHEST PA AND LATERAL    CLINICAL HISTORY:  Cough, unspecified    TECHNIQUE:  PA and lateral views of the chest were performed.    COMPARISON:  11/27/2017    FINDINGS:  Cardiomediastinal silhouette within normal limits.  No confluent airspace opacity or lobar consolidation.  No pleural effusion or gross pneumothorax.  No acute osseous abnormality.                                       Medications - No data to display  Medical Decision Making  Emergent evaluation of a 55-year-old male who has had a productive cough over the past few days.  He was seen at urgent care where he had negative COVID and influenza swabs.  Chest x-ray performed here in the ED shows no infiltrates.  Most likely etiology is viral.  Patient says he is having trouble controlling his cough and coughing throughout the night in spite of taking Phenergan with dextromethorphan.  I will switch this to Phenergan with codeine.  He should follow up with his primary physician for recheck but may return to the ED for any possible worsening.    Amount and/or Complexity of Data Reviewed  Radiology: ordered.     Details: Chest x-ray without acute cardiopulmonary abnormality.    Risk  Prescription drug management.                                      Clinical Impression:  Final diagnoses:  [R05.9] Cough  [J06.9] Viral URI with cough (Primary)          ED Disposition Condition    Discharge Stable          ED Prescriptions        Medication Sig Dispense Start Date End Date Auth. Provider    promethazine-codeine 6.25-10 mg/5 ml (PHENERGAN WITH CODEINE) 6.25-10 mg/5 mL syrup Take 5 mLs by mouth every 4 (four) hours as needed for Cough. 200 mL 3/3/2025 3/13/2025 Luis A Beltrán MD          Follow-up Information       Follow up With Specialties Details Why Contact Info    James Dawson MD Family Medicine  As needed [address] [facility] [address]  [phone]      [facility] Emergency Dept Emergency Medicine  If symptoms worsen [address] [facility] [address] [phone]                 [1]   Social History  Tobacco Use    Smoking status: Never    Smokeless tobacco: Never   Substance Use Topics    Alcohol use: Yes     Comment: 1 a month        Luis A Beltrán MD  03/03/25 [id number]

## 2025-06-02 ENCOUNTER — PATIENT OUTREACH (OUTPATIENT)
Dept: ADMINISTRATIVE | Facility: HOSPITAL | Age: 55
End: 2025-06-02
Payer: COMMERCIAL

## 2025-06-30 ENCOUNTER — OFFICE VISIT (OUTPATIENT)
Dept: FAMILY MEDICINE | Facility: CLINIC | Age: 55
End: 2025-06-30
Payer: COMMERCIAL

## 2025-06-30 VITALS
BODY MASS INDEX: 26.25 KG/M2 | HEART RATE: 78 BPM | WEIGHT: 167.25 LBS | DIASTOLIC BLOOD PRESSURE: 70 MMHG | HEIGHT: 67 IN | SYSTOLIC BLOOD PRESSURE: 114 MMHG | OXYGEN SATURATION: 97 %

## 2025-06-30 DIAGNOSIS — N52.9 ERECTILE DYSFUNCTION, UNSPECIFIED ERECTILE DYSFUNCTION TYPE: ICD-10-CM

## 2025-06-30 DIAGNOSIS — Z12.11 SCREEN FOR COLON CANCER: ICD-10-CM

## 2025-06-30 DIAGNOSIS — E78.49 OTHER HYPERLIPIDEMIA: ICD-10-CM

## 2025-06-30 DIAGNOSIS — Z23 NEED FOR SHINGLES VACCINE: ICD-10-CM

## 2025-06-30 DIAGNOSIS — I69.314 FRONTAL LOBE AND EXECUTIVE FUNCTION DEFICIT FOLLOWING CEREBRAL INFARCTION: ICD-10-CM

## 2025-06-30 DIAGNOSIS — N52.9 ERECTILE DYSFUNCTION, UNSPECIFIED ERECTILE DYSFUNCTION TYPE: Primary | ICD-10-CM

## 2025-06-30 PROCEDURE — 99999 PR PBB SHADOW E&M-EST. PATIENT-LVL IV: CPT | Mod: PBBFAC,,, | Performed by: FAMILY MEDICINE

## 2025-06-30 PROCEDURE — 3074F SYST BP LT 130 MM HG: CPT | Mod: CPTII,S$GLB,, | Performed by: FAMILY MEDICINE

## 2025-06-30 PROCEDURE — 90750 HZV VACC RECOMBINANT IM: CPT | Mod: S$GLB,,, | Performed by: FAMILY MEDICINE

## 2025-06-30 PROCEDURE — 1159F MED LIST DOCD IN RCRD: CPT | Mod: CPTII,S$GLB,, | Performed by: FAMILY MEDICINE

## 2025-06-30 PROCEDURE — 3008F BODY MASS INDEX DOCD: CPT | Mod: CPTII,S$GLB,, | Performed by: FAMILY MEDICINE

## 2025-06-30 PROCEDURE — 3078F DIAST BP <80 MM HG: CPT | Mod: CPTII,S$GLB,, | Performed by: FAMILY MEDICINE

## 2025-06-30 PROCEDURE — 90471 IMMUNIZATION ADMIN: CPT | Mod: S$GLB,,, | Performed by: FAMILY MEDICINE

## 2025-06-30 PROCEDURE — 3044F HG A1C LEVEL LT 7.0%: CPT | Mod: CPTII,S$GLB,, | Performed by: FAMILY MEDICINE

## 2025-06-30 PROCEDURE — 99215 OFFICE O/P EST HI 40 MIN: CPT | Mod: 25,S$GLB,, | Performed by: FAMILY MEDICINE

## 2025-06-30 RX ORDER — TADALAFIL 20 MG/1
20 TABLET ORAL DAILY
Qty: 30 TABLET | Refills: 6 | OUTPATIENT
Start: 2025-06-30 | End: 2026-06-30

## 2025-06-30 RX ORDER — TADALAFIL 20 MG/1
20 TABLET ORAL DAILY
Qty: 30 TABLET | Refills: 6 | Status: SHIPPED | OUTPATIENT
Start: 2025-06-30 | End: 2026-06-30

## 2025-06-30 RX ORDER — ATORVASTATIN CALCIUM 40 MG/1
40 TABLET, FILM COATED ORAL NIGHTLY
Qty: 90 TABLET | Refills: 3 | Status: SHIPPED | OUTPATIENT
Start: 2025-06-30

## 2025-06-30 NOTE — TELEPHONE ENCOUNTER
No care due was identified.  [facility] Embedded Care Due Messages. Reference number: [id number].   6/30/2025 1:56:09 PM CDT

## 2025-06-30 NOTE — PROGRESS NOTES
Subjective     Patient ID: Monster Crespo is a 55 y.o. male.    Chief Complaint: Hyperlipidemia and Erectile Dysfunction    55 years old male came to the clinic with erectile dysfunction.  Patient is requesting Cialis to help with the symptoms.  He was using a small dose before with partial improvement.  He was not taking the cholesterol medicine.  Patient previously diagnosed with stroke.  Patient due for his colonoscopy.  He is willing to do the shingles vaccine today.    Hyperlipidemia  This is a chronic problem. The problem is uncontrolled. Recent lipid tests were reviewed and are high. He has no history of obesity. There are no known factors aggravating his hyperlipidemia. Pertinent negatives include no chest pain or myalgias. Current antihyperlipidemic treatment includes statins. The current treatment provides mild improvement of lipids. Risk factors for coronary artery disease include male sex and a sedentary lifestyle.   Erectile Dysfunction  This is a new problem. The problem has been gradually worsening since onset. The nature of his difficulty is achieving erection. Non-physiologic factors contributing to erectile dysfunction are anxiety. Irritative symptoms do not include frequency, nocturia or urgency. Pertinent negatives include no dysuria. The symptoms are aggravated by stress. Past treatments include tadalafil. The treatment provided mild relief.     Review of Systems   Constitutional: Negative.    HENT: Negative.     Eyes: Negative.    Respiratory: Negative.     Cardiovascular: Negative.  Negative for chest pain.   Gastrointestinal: Negative.    Genitourinary: Negative.  Negative for dysuria, frequency, nocturia and urgency.   Musculoskeletal: Negative.  Negative for myalgias.   Integumentary:  Negative.   Neurological: Negative.    Psychiatric/Behavioral:  The patient is nervous/anxious.           Objective     Physical Exam  Vitals and nursing note reviewed.   Constitutional:       General: He is  not in acute distress.     Appearance: He is well-developed. He is not diaphoretic.   HENT:      Head: Normocephalic and atraumatic.      Right Ear: External ear normal.      Left Ear: External ear normal.      Nose: Nose normal.      Mouth/Throat:      Pharynx: No oropharyngeal exudate.   Eyes:      General: No scleral icterus.        Right eye: No discharge.         Left eye: No discharge.      Conjunctiva/sclera: Conjunctivae normal.      Pupils: Pupils are equal, round, and reactive to light.   Neck:      Thyroid: No thyromegaly.      Vascular: No JVD.      Trachea: No tracheal deviation.   Cardiovascular:      Rate and Rhythm: Normal rate and regular rhythm.      Heart sounds: Normal heart sounds. No murmur heard.     No friction rub. No gallop.   Pulmonary:      Effort: Pulmonary effort is normal. No respiratory distress.      Breath sounds: Normal breath sounds. No stridor. No wheezing or rales.   Chest:      Chest wall: No tenderness.   Abdominal:      General: Bowel sounds are normal. There is no distension.      Palpations: Abdomen is soft. There is no mass.      Tenderness: There is no abdominal tenderness. There is no guarding or rebound.   Musculoskeletal:         General: No tenderness. Normal range of motion.      Cervical back: Normal range of motion and neck supple.   Lymphadenopathy:      Cervical: No cervical adenopathy.   Skin:     General: Skin is warm and dry.      Coloration: Skin is not pale.      Findings: No erythema or rash.   Neurological:      Mental Status: He is alert and oriented to person, place, and time.      Cranial Nerves: No cranial nerve deficit.      Motor: No abnormal muscle tone.      Coordination: Coordination normal.      Deep Tendon Reflexes: Reflexes are normal and symmetric. Reflexes normal.   Psychiatric:         Behavior: Behavior normal.         Thought Content: Thought content normal.         Judgment: Judgment normal.            Assessment and Plan     1. Erectile  dysfunction, unspecified erectile dysfunction type  -     tadalafiL (CIALIS) 20 MG Tab; Take 1 tablet (20 mg total) by mouth once daily.  Dispense: 30 tablet; Refill: 6    2. Left Frontal Lobe and insula infarct  -     atorvastatin (LIPITOR) 40 MG tablet; Take 1 tablet (40 mg total) by mouth every evening.  Dispense: 90 tablet; Refill: 3    3. Dyslipidemia  -     atorvastatin (LIPITOR) 40 MG tablet; Take 1 tablet (40 mg total) by mouth every evening.  Dispense: 90 tablet; Refill: 3  -     Comprehensive Metabolic Panel; Future; Expected date: 06/30/2025  -     Lipid Panel; Future; Expected date: 06/30/2025  -     TSH; Future; Expected date: 06/30/2025  -     CBC Auto Differential; Future; Expected date: 06/30/2025  -     Comprehensive Metabolic Panel; Future; Expected date: 06/30/2025  -     Lipid Panel; Future; Expected date: 06/30/2025  -     TSH; Future; Expected date: 06/30/2025  -     CBC Auto Differential; Future; Expected date: 06/30/2025    4. Screen for colon cancer  -     Ambulatory referral/consult to Endo Procedure ; Future; Expected date: 07/01/2025    5. Need for shingles vaccine  -     varicella zoster (Shingrix) IM vaccine (>/= 49 yo)        I spent a total of 41 minutes on the day of the visit.This includes face to face time and non-face to face time preparing to see the patient (eg, review of tests), obtaining and/or reviewing separately obtained history, documenting clinical information in the electronic or other health record, independently interpreting results and communicating results to the patient/family/caregiver, or care coordinator.        Restart statin therapy.  Follow up in about 6 months (around 12/30/2025), or if symptoms worsen or fail to improve.

## 2025-07-01 NOTE — TELEPHONE ENCOUNTER
Refill Decision Note   Monster Crespo  is requesting a refill authorization.  Brief Assessment and Rationale for Refill:  Quick Discontinue     Medication Therapy Plan:  Duplicate      Comments:     Note composed:10:18 PM 06/30/2025

## 2025-07-02 ENCOUNTER — TELEPHONE (OUTPATIENT)
Dept: ENDOSCOPY | Facility: HOSPITAL | Age: 55
End: 2025-07-02
Payer: COMMERCIAL

## 2025-07-02 NOTE — TELEPHONE ENCOUNTER
Telephone patient to schedule colonoscopy with no answer. Direct contact given to call back to schedule procedure.

## 2025-07-08 ENCOUNTER — PATIENT MESSAGE (OUTPATIENT)
Dept: FAMILY MEDICINE | Facility: HOSPITAL | Age: 55
End: 2025-07-08
Payer: COMMERCIAL

## 2025-07-21 ENCOUNTER — LAB VISIT (OUTPATIENT)
Dept: LAB | Facility: HOSPITAL | Age: 55
End: 2025-07-21
Attending: FAMILY MEDICINE
Payer: COMMERCIAL

## 2025-07-21 DIAGNOSIS — E78.49 OTHER HYPERLIPIDEMIA: ICD-10-CM

## 2025-07-21 LAB
ABSOLUTE EOSINOPHIL (OHS): 0.26 K/UL
ABSOLUTE MONOCYTE (OHS): 0.64 K/UL (ref 0.3–1)
ABSOLUTE NEUTROPHIL COUNT (OHS): 3.18 K/UL (ref 1.8–7.7)
ALBUMIN SERPL BCP-MCNC: 3.6 G/DL (ref 3.5–5.2)
ALP SERPL-CCNC: 64 UNIT/L (ref 40–150)
ALT SERPL W/O P-5'-P-CCNC: 28 UNIT/L (ref 10–44)
ANION GAP (OHS): 8 MMOL/L (ref 8–16)
AST SERPL-CCNC: 23 UNIT/L (ref 11–45)
BASOPHILS # BLD AUTO: 0.05 K/UL
BASOPHILS NFR BLD AUTO: 0.8 %
BILIRUB SERPL-MCNC: 0.5 MG/DL (ref 0.1–1)
BUN SERPL-MCNC: 20 MG/DL (ref 6–20)
CALCIUM SERPL-MCNC: 8.4 MG/DL (ref 8.7–10.5)
CHLORIDE SERPL-SCNC: 110 MMOL/L (ref 95–110)
CHOLEST SERPL-MCNC: 192 MG/DL (ref 120–199)
CHOLEST/HDLC SERPL: 5.2 {RATIO} (ref 2–5)
CO2 SERPL-SCNC: 21 MMOL/L (ref 23–29)
CREAT SERPL-MCNC: 0.9 MG/DL (ref 0.5–1.4)
ERYTHROCYTE [DISTWIDTH] IN BLOOD BY AUTOMATED COUNT: 14.8 % (ref 11.5–14.5)
GFR SERPLBLD CREATININE-BSD FMLA CKD-EPI: >60 ML/MIN/1.73/M2
GLUCOSE SERPL-MCNC: 94 MG/DL (ref 70–110)
HCT VFR BLD AUTO: 42.6 % (ref 40–54)
HDLC SERPL-MCNC: 37 MG/DL (ref 40–75)
HDLC SERPL: 19.3 % (ref 20–50)
HGB BLD-MCNC: 13.5 GM/DL (ref 14–18)
IMM GRANULOCYTES # BLD AUTO: 0.04 K/UL (ref 0–0.04)
IMM GRANULOCYTES NFR BLD AUTO: 0.6 % (ref 0–0.5)
LDLC SERPL CALC-MCNC: 117.2 MG/DL (ref 63–159)
LYMPHOCYTES # BLD AUTO: 2.47 K/UL (ref 1–4.8)
MCH RBC QN AUTO: 28.4 PG (ref 27–31)
MCHC RBC AUTO-ENTMCNC: 31.7 G/DL (ref 32–36)
MCV RBC AUTO: 90 FL (ref 82–98)
NONHDLC SERPL-MCNC: 155 MG/DL
NUCLEATED RBC (/100WBC) (OHS): 0 /100 WBC
PLATELET # BLD AUTO: 280 K/UL (ref 150–450)
PMV BLD AUTO: 10.3 FL (ref 9.2–12.9)
POTASSIUM SERPL-SCNC: 3.7 MMOL/L (ref 3.5–5.1)
PROT SERPL-MCNC: 6.6 GM/DL (ref 6–8.4)
RBC # BLD AUTO: 4.75 M/UL (ref 4.6–6.2)
RELATIVE EOSINOPHIL (OHS): 3.9 %
RELATIVE LYMPHOCYTE (OHS): 37.2 % (ref 18–48)
RELATIVE MONOCYTE (OHS): 9.6 % (ref 4–15)
RELATIVE NEUTROPHIL (OHS): 47.9 % (ref 38–73)
SODIUM SERPL-SCNC: 139 MMOL/L (ref 136–145)
T4 FREE SERPL-MCNC: 0.9 NG/DL (ref 0.71–1.51)
TRIGL SERPL-MCNC: 189 MG/DL (ref 30–150)
TSH SERPL-ACNC: 4.45 UIU/ML (ref 0.4–4)
WBC # BLD AUTO: 6.64 K/UL (ref 3.9–12.7)

## 2025-07-21 PROCEDURE — 82465 ASSAY BLD/SERUM CHOLESTEROL: CPT

## 2025-07-21 PROCEDURE — 84439 ASSAY OF FREE THYROXINE: CPT

## 2025-07-21 PROCEDURE — 36415 COLL VENOUS BLD VENIPUNCTURE: CPT | Mod: PO

## 2025-07-21 PROCEDURE — 84443 ASSAY THYROID STIM HORMONE: CPT

## 2025-07-21 PROCEDURE — 85025 COMPLETE CBC W/AUTO DIFF WBC: CPT

## 2025-07-21 PROCEDURE — 84075 ASSAY ALKALINE PHOSPHATASE: CPT

## 2025-07-31 ENCOUNTER — TELEPHONE (OUTPATIENT)
Dept: GASTROENTEROLOGY | Facility: CLINIC | Age: 55
End: 2025-07-31
Payer: COMMERCIAL

## 2025-07-31 NOTE — TELEPHONE ENCOUNTER
Tried to contact pt to schedule his colonoscopy, no answer.  Left detail message for pt to contact clinic to schedule his colonoscopy.  [phone]

## 2025-08-04 ENCOUNTER — TELEPHONE (OUTPATIENT)
Dept: GASTROENTEROLOGY | Facility: CLINIC | Age: 55
End: 2025-08-04
Payer: COMMERCIAL

## 2025-08-04 NOTE — TELEPHONE ENCOUNTER
Tried to contact pt to schedule his colonoscopy, no answer.  Left detail message for pt to contact clinic to schedule his colonoscopy.  [phone]

## 2025-08-07 ENCOUNTER — TELEPHONE (OUTPATIENT)
Dept: ENDOSCOPY | Facility: HOSPITAL | Age: 55
End: 2025-08-07
Payer: COMMERCIAL